# Patient Record
Sex: FEMALE | Race: WHITE | NOT HISPANIC OR LATINO | Employment: OTHER | URBAN - METROPOLITAN AREA
[De-identification: names, ages, dates, MRNs, and addresses within clinical notes are randomized per-mention and may not be internally consistent; named-entity substitution may affect disease eponyms.]

---

## 2021-03-23 ENCOUNTER — OFFICE VISIT CONVERTED (OUTPATIENT)
Dept: GASTROENTEROLOGY | Facility: CLINIC | Age: 81
End: 2021-03-23
Attending: NURSE PRACTITIONER

## 2021-05-10 NOTE — H&P
"   History and Physical      Patient Name: Rupal Buchanan   Patient ID: [id number]   Sex: Female   YOB: 1940    Primary Care Provider: Morris Tanner MD   Referring Provider: Morris Tanner MD    Visit Date: March 23, 2021    Provider: KEELEY AGUILERA   Location: [facility]   Location Address: [address] [place] [address]  [id number]   Location Phone: [phone]          Chief Complaint  · \"Abdominal pain\"      History Of Present Illness  The patient is a 81 year old /White female, who presents on referral from Morris Tanner MD, for a gastroenterology evaluation for abdominal pain.   The patient has described the pain as mild to moderate and crampy occurring intermittently. The pain is unchanged since it started. The location of the pain is LLQ and has been present for 4 months. The pain is improved with nothing and is worse with eating. The pain is not reproducible with palpation over the area.   The patient admits to no other complaints.   Previous Diagnostic Studies:  A GI diagnostic workup, prior to the initial visit, included the following studies: an abdominal CT scan. The abdominal CT, performed revealed the following: and see below A 1.3 cm linear radiopaque object within the colon at the hepatic flexure in the hepatic flexure.   Additionally, most laboratory test were normal except the following studies. Abnormal labs are CBC and CMP. CBCD revealed See below.. CMP revealed See below..      81-year-old female presents to the office as a new patient with a history of abdominal pain in the left lower quadrant, anemia, history of colon polyps, reflux and abnormal CT scan of the abdomen.  Patient was recently seen in the ER 1/15/2021 for abdominal pain in the left lower quadrant.  CT scan of the abdomen and pelvis with contrast revealed trace bilateral pleural effusions with left basilar atelectasis, hepatic steatosis, sigmoid diverticulosis, " suggestion of a 1.3 cm linear radiopaque object within the colon at the hepatic flexure, focal calcifications within mid uterus possibly a partially calcified fibroid and a small amount of pelvic fluid.  Patient was told to follow-up with gastroenterologist Dr. Gonzales and take Gas-X over-the-counter.  Patient reports she continues to have intermittent left lower quadrant abdominal pain and she feels this is related to gas.  Patient does not have any pain in the right upper quadrant.  Patient denies melena, hematochezia, weight loss, night sweats, fever, nausea, vomiting, altered bowel habits, chest pain, shortness of breath.  Patient reports that the pain in the left lower quadrant is worse with eating fried fatty foods or gas producing foods.  She reports this started in December and has worsened since then.  Patient is having 2 bowel movements a day that are easy to pass with no constipation.  Patient is on blood thinner Eliquis which was prescribed by her cardiologist Dr. Walsh.  Patient reports longstanding reflux over the past 50 years and is on pantoprazole 40 mg.  Patient reports control of her reflux.  Patient has a family history of stomach cancer within her sister at age 75.    Labs: 1/15/2021 [id number], creatinine1.55, GFR31, ALT15 AST24, RBC3.76, ygwvrmcxug10.4, xcmsnfzbxb42.9    Colonoscopy: Reviewed the patient's most recent colonoscopy performed by Dr. Oseguera 8/5/2016 revealed a normal colon throughout with no AVMs masses or colitis seen.    CT scan of the abdomen and pelvis with contrast: 1/15/2021 within the ER revealed trace bilateral pleural effusions with left basilar atelectasis, hepatic steatosis, sigmoid diverticulosis, suggestion of a 1.3 cm linear radiopaque object within the colon at hepatic flexure correlate if there has been any recent colonoscopy with a clipping, focal calcifications within mid uterus possibly a partially calcified fibroid, small amount of pelvic free fluid which  is nonspecific.       Past Medical History  Disease Name Date Onset Notes   Allergy, Unspecified --  seasonal   Anemia, iron deficiency --  --    Anxiety --  --    Arthritis --  --    Bladder disorder --  --    Chronic cystitis 02/15/2017 --    Cystitis (Acute) 01/20/2016 --    Dysuria 01/20/2016 --    GERD --  --    High cholesterol --  --    OAB (overactive bladder) --  --    Osteoporosis --  --    Vulvitis --  --          Past Surgical History  Procedure Name Date Notes   Appendectomy --  --    Cardiac Catherization --  --    Cholecystectomy --  --    Colonoscopy 2016 --    EGD 2005 --          Medication List  Name Date Started Instructions   alprazolam 1 mg oral tablet  take 1 tablet by oral route 2 times a day   atenolol 50 mg oral tablet  take 1 tablet (50 mg) by oral route once daily   Eliquis 5 mg oral tablet  take 1 tablet by oral route daily   hydrochlorothiazide 25 mg oral tablet  take 1 tablet (25 mg) by oral route once daily   pantoprazole 40 mg oral tablet,delayed release (DR/EC)  take 1 tablet (40 mg) by oral route once daily   ropinirole 5 mg oral tablet  take 1 tablet by oral route daily   simvastatin 40 mg oral tablet  take 1 tablet (40 mg) by oral route once daily in the evening   Suprep Bowel Prep Kit 17.5-3.13-1.6 gram oral recon soln  take as directed for bowel prep         Allergy List  Allergen Name Date Reaction Notes   Latex --  --  --    nitrofurantoin --  stomach pain, nausea, leg weakness, dizziness --    SULFA (SULFONAMIDES) --  --  --          Family Medical History  Disease Name Relative/Age Notes   Stomach Neoplasm, Malignant Sister/   --    Diabetes, unspecified type  --          Social History  Finding Status Start/Stop Quantity Notes   Alcohol Former --/-- --  --    Tobacco Former --/-- --  chews         Review of Systems  · Constitutional  o Denies  o : chills, fatigue, fever, loss of appetite, malaise, night sweats, weakness, weight gain, weight loss  · Eyes  o Denies  o :  "blurred vision, changes in vision  · Cardiovascular  o Denies  o : chest pain  · Respiratory  o Denies  o : shortness of breath  · Gastrointestinal  o Admits  o : abdominal pain, bloating, heartburn, reflux  o Denies  o : appetite poor, bowel changes, constipation, hematemesis (vomiting blood), hemorrhoids, jaundice, nausea, rectal bleeding, vomiting, reviewed and unchanged  · Genitourinary  o Denies  o : dysuria, blood in urine  · Integument  o Denies  o : rash  · Neurologic  o Denies  o : tingling or numbness  · Musculoskeletal  o Denies  o : joint pain  · Endocrine  o Denies  o : weight gain, weight loss  · Psychiatric  o Denies  o : anxiety, depression      Vitals  Date Time BP Position Site L\R Cuff Size HR RR TEMP (F) WT  HT  BMI kg/m2 BSA m2 O2 Sat FR L/min FiO2 HC       03/23/2021 09:48 /62 Sitting    68 - R 12  153lbs 0oz 5'  5\" 25.46 1.78 100 %            Physical Examination  · Constitutional  o Appearance  o : Well-nourished, well developed, alert, in no acute distress, alert and oriented X 3.  · Eyes  o Vision  o :   § Visual Fields  § : move symmetrical in all directions  o Sclerae  o : sclerae anicteric  o Pupils and Irises  o : pupils equal and symetrical  · Neck  o Inspection/Palpation  o : Trachea is midline, no adenopathy  o Thyroid  o : Thyroid is not enlarged  · Respiratory  o Respiratory Effort  o : Breathing is unlabored.  o Inspection of Chest  o : normal appearance, no retractions  o Auscultation of Lungs  o : Chest is clear to auscultation bilaterally  · Cardiovascular  o Heart  o :   § Auscultation of Heart  § : no murmurs, rubs, or gallops, regular rate and rhythm   · Gastrointestinal  o Abdominal Examination  o : Abdomen is soft, nontender to palpation, with normal active bowel sounds, no appreciable hepatosplenomegaly.  · Genitourinary  o Digital Rectal Examination  o : Deferred  · Skin and Subcutaneous Tissue  o General Inspection  o : Skin is without focal lesions. Skin turgor " is normal.  · Psychiatric  o Mood and Affect  o : Mood and affect are appropriate to circumstances.          Assessment  · Abdominal pain, left lower quadrant     789.04/R10.32  · Family History of Colon Cancer     V16.0/Z80.0  · Abnormal CT scan, colon     793.4/R93.3  · Abdominal bloating     787.3/R14.0  · GERD (gastroesophageal reflux disease)     530.81/K21.9      Plan  · Orders  o Flexible Colonoscopy -Possible risks/complications, benefits, and alternatives to surgical or invasive procedure have been explained to patient and/or legal gaurdian. -Patient has been evaluated and can tolerate anethesia and/or sedation. Risk, benefits, and alternatives to anethesia and/or sedation have been explained to patient and/or legal gaurdian. ([id number]) - - 03/23/2021  · Medications  o Medications have been Reconciled  o Transition of Care or Provider Policy  · Instructions  o DISCUSSION:  o PLAN:  o Will schedule colonoscopy. I have discussed the risks and benefits of the procedure with the patient as well as the procedure itself and they understand.  o 81-year-old female presents to the office today as a new patient with a history of abdominal pain in the left lower quadrant, anemia, history of colon polyps, reflux and abnormal CT scan of the abdomen. CT scan of the abdomen and pelvis with contrast suggestive of a 1.3 cm linear radiopaque object within the colon at the hepatic flexure. Patient's most recent colonoscopy was in 2016 revealed a normal colon throughout. Patient does have a history of colon polyps in 2005 the patient reports. Patient has family history of colorectal cancer. We have discussed the risk and benefits associated with colonoscopy especially due to age and patient being on Eliquis. Patient would like to proceed with a colonoscopy for further evaluation. We will obtain cardiac clearance from . Patient will continue pantoprazole 40 mg daily for reflux. We will follow-up in the office after  colonoscopy. Patient to call with any questions or concerns. I have discussed the patient's case with Dr. Gonzales.   · Disposition  o Call or Return if symptoms worsen or persist.            Electronically Signed by: KEELEY AGUILERA -Author on March 23, 2021 10:47:48 AM  Electronically Co-signed by: Cullen Gonzales MD -Reviewer on April 18, 2021 01:08:04 AM

## 2021-05-14 VITALS
HEART RATE: 68 BPM | BODY MASS INDEX: 25.49 KG/M2 | OXYGEN SATURATION: 100 % | SYSTOLIC BLOOD PRESSURE: 133 MMHG | RESPIRATION RATE: 12 BRPM | DIASTOLIC BLOOD PRESSURE: 62 MMHG | HEIGHT: 65 IN | WEIGHT: 153 LBS

## 2021-10-06 DIAGNOSIS — M25.561 RIGHT KNEE PAIN, UNSPECIFIED CHRONICITY: Primary | ICD-10-CM

## 2021-10-11 ENCOUNTER — OFFICE VISIT (OUTPATIENT)
Dept: ORTHOPEDIC SURGERY | Facility: CLINIC | Age: 81
End: 2021-10-11

## 2021-10-11 VITALS — WEIGHT: 151 LBS | BODY MASS INDEX: 25.16 KG/M2 | TEMPERATURE: 97.3 F | HEIGHT: 65 IN

## 2021-10-11 DIAGNOSIS — M11.20 CHONDROCALCINOSIS: ICD-10-CM

## 2021-10-11 DIAGNOSIS — M17.11 PRIMARY OSTEOARTHRITIS OF RIGHT KNEE: Primary | ICD-10-CM

## 2021-10-11 PROCEDURE — 20610 DRAIN/INJ JOINT/BURSA W/O US: CPT | Performed by: PHYSICIAN ASSISTANT

## 2021-10-11 PROCEDURE — 99204 OFFICE O/P NEW MOD 45 MIN: CPT | Performed by: PHYSICIAN ASSISTANT

## 2021-10-11 RX ORDER — PANTOPRAZOLE SODIUM 40 MG/1
40 TABLET, DELAYED RELEASE ORAL DAILY
COMMUNITY

## 2021-10-11 RX ORDER — METHYLPREDNISOLONE ACETATE 80 MG/ML
160 INJECTION, SUSPENSION INTRA-ARTICULAR; INTRALESIONAL; INTRAMUSCULAR; SOFT TISSUE
Status: COMPLETED | OUTPATIENT
Start: 2021-10-11 | End: 2021-10-11

## 2021-10-11 RX ORDER — OXYBUTYNIN CHLORIDE 10 MG/1
TABLET, EXTENDED RELEASE ORAL
Status: ON HOLD | COMMUNITY
End: 2022-01-03

## 2021-10-11 RX ORDER — HYDROCHLOROTHIAZIDE 25 MG/1
25 TABLET ORAL DAILY
COMMUNITY

## 2021-10-11 RX ORDER — ROPINIROLE 1 MG/1
1 TABLET, FILM COATED ORAL NIGHTLY
COMMUNITY

## 2021-10-11 RX ORDER — SIMVASTATIN 40 MG
40 TABLET ORAL NIGHTLY
COMMUNITY

## 2021-10-11 RX ORDER — ATENOLOL 50 MG/1
50 TABLET ORAL DAILY
COMMUNITY

## 2021-10-11 RX ORDER — HYDROCODONE BITARTRATE AND ACETAMINOPHEN 7.5; 325 MG/1; MG/1
1 TABLET ORAL EVERY 6 HOURS PRN
COMMUNITY

## 2021-10-11 RX ORDER — ALPRAZOLAM 1 MG/1
1 TABLET ORAL EVERY 12 HOURS SCHEDULED
COMMUNITY
End: 2023-03-25 | Stop reason: SDUPTHER

## 2021-10-11 RX ADMIN — METHYLPREDNISOLONE ACETATE 160 MG: 80 INJECTION, SUSPENSION INTRA-ARTICULAR; INTRALESIONAL; INTRAMUSCULAR; SOFT TISSUE at 15:32

## 2021-10-11 NOTE — PROGRESS NOTES
"Chief Complaint  Pain of the Right Knee and Establish Care    Subjective    History of Present Illness      Rupal Buchanan is a 81 y.o. female who presents to [facility] for complaint of Knee Pain:    Patient complains of right knee pain and swelling. She denies injury.  The pain began several months ago (since early Spring).   The pain is located over patellar aspect.    She describes the symptoms as aching and grinding.   Symptoms improve with rest.   The symptoms are worse with activity.   The knee has given out or felt unstable.   The patient can bend and straighten the knee fully.    She has not noticed the swelling improving of the last few months.            Objective   Vital Signs:   Temp 97.3 °F (36.3 °C)   Ht 165.1 cm (65\")   Wt 68.5 kg (151 lb)   BMI 25.13 kg/m²     Physical Exam  Vitals signs and nursing note reviewed.   Constitutional:       Appearance: Normal appearance.   Pulmonary:      Effort: Pulmonary effort is normal.   Skin:     General: Skin is warm and dry.      Capillary Refill: Capillary refill takes less than 2 seconds.   Neurological:      General: No focal deficit present.      Mental Status: He is alert and oriented to person, place, and time. Mental status is at baseline.   Psychiatric:         Mood and Affect: Mood normal.         Behavior: Behavior normal.         Thought Content: Thought content normal.         Judgment: Judgment normal.     Ortho Exam   RIGHT knee  Positive patellar grind test with pain in the retropatellar region.    Positive for high Q-angle with patella tracking laterally in high grades of flexion.   Skin and soft tissues are swollen, consistent with inflammatory changes of the patellofemoral joint.    Positive for tenderness over the medial patellofemoral ligaments.   Quadriceps mechanism is well preserved.   Range of motion-Extension to Flexion: 0-100 degrees.  Negative anterior and posterior drawer. No medial or lateral " instability noted.   Dorsalis pedis and posterior tibial artery pulses are palpable. Common peroneal nerve function is well preserved.      Result Review :   Radiologic studies - see below for interpretation  Right knee xrays 3 views, performed at [facility] 8/12/21, summary of independent interpretation by myself as follows:   Findings: Right knee advanced OA of patellofemoral compartment, moderate degenerative changes of the medial and lateral compartments, chondrocalcinosis throughout the entire joint.          PROCEDURE  Large Joint Arthrocentesis: R knee  Date/Time: 10/11/2021 3:32 PM  Consent given by: patient  Site marked: site marked  Timeout: Immediately prior to procedure a time out was called to verify the correct patient, procedure, equipment, support staff and site/side marked as required   Supporting Documentation  Indications: pain   Procedure Details  Location: knee - R knee  Preparation: Patient was prepped and draped in the usual sterile fashion  Needle size: 18 G  Approach: lateral  Medications administered: 160 mg methylPREDNISolone acetate 80 MG/ML; 2 mL lidocaine (cardiac)  Aspirate amount: 19 mL  Aspirate: yellow  Patient tolerance: patient tolerated the procedure well with no immediate complications                 Assessment   Assessment and Plan    Problem List Items Addressed This Visit        Musculoskeletal and Injuries    Chondrocalcinosis    Relevant Orders    Large Joint Arthrocentesis: R knee    Primary osteoarthritis of right knee - Primary    Relevant Orders    Large Joint Arthrocentesis: R knee          Follow Up   · Discussion of any imaging in detail. Discussion of orthopaedic goals.  · Risk, benefits, and merits of treatment alternatives reviewed with the patient. Treatment alternatives include: intra-articular steroid injection and eventual surgery. Discussed causes of chondrocalcinosis, as well as treatments.  · Ice, heat, and/or modalities as beneficial  · Risks of  minor surgical procedure/intra-articular injection, including but not limited to pain, bleeding, infection into the joint, pigment skin changes related to steroid administration, increased blood sugar levels secondary to steroid administration, scar, recurrence of pain, and tendon rupture associated with steroid administration and possible need for further surgery reviewed with patient she accepts these risks and wishes to proceed with procedure. Aspiration followed by Injection to patient's right knee joint(s) with Depo-Medrol from a(n) lateral approach.  Patient tolerated the procedure well and no complications were encountered.  · Watch for signs and symptoms of infection  · Patient is encouraged to call or return for any issues or concerns.  · Follow up in 3 months  • Patient was given instructions and counseling regarding her condition or for health maintenance advice. Please see specific information pulled into the AVS if appropriate.     Justo Polanco PA-C   Date of Encounter: 10/11/2021   Electronically signed by Justo Polanco PA-C, 10/11/21, 3:35 PM EDT.     EMR Dragon/Transcription disclaimer:  Much of this encounter note is an electronic transcription/translation of spoken language to printed text. The electronic translation of spoken language may permit erroneous, or at times, nonsensical words or phrases to be inadvertently transcribed; Although I have reviewed the note for such errors, some may still exist.

## 2022-01-02 ENCOUNTER — HOSPITAL ENCOUNTER (INPATIENT)
Facility: HOSPITAL | Age: 82
LOS: 2 days | Discharge: HOME | End: 2022-01-07
Attending: EMERGENCY MEDICINE | Admitting: INTERNAL MEDICINE

## 2022-01-02 ENCOUNTER — APPOINTMENT (OUTPATIENT)
Dept: GENERAL RADIOLOGY | Facility: HOSPITAL | Age: 82
End: 2022-01-02

## 2022-01-02 DIAGNOSIS — N28.9 RENAL INSUFFICIENCY: ICD-10-CM

## 2022-01-02 DIAGNOSIS — R07.9 CHEST PAIN, UNSPECIFIED TYPE: ICD-10-CM

## 2022-01-02 DIAGNOSIS — I48.0 PAROXYSMAL ATRIAL FIBRILLATION: ICD-10-CM

## 2022-01-02 DIAGNOSIS — I20.8 STABLE ANGINA PECTORIS: Primary | ICD-10-CM

## 2022-01-02 LAB — TROPONIN I SERPL-MCNC: 0 NG/ML (ref 0–0.6)

## 2022-01-02 PROCEDURE — 93005 ELECTROCARDIOGRAM TRACING: CPT | Performed by: EMERGENCY MEDICINE

## 2022-01-02 PROCEDURE — 93010 ELECTROCARDIOGRAM REPORT: CPT | Performed by: INTERNAL MEDICINE

## 2022-01-02 PROCEDURE — 36415 COLL VENOUS BLD VENIPUNCTURE: CPT

## 2022-01-02 PROCEDURE — 93005 ELECTROCARDIOGRAM TRACING: CPT

## 2022-01-02 PROCEDURE — 84484 ASSAY OF TROPONIN QUANT: CPT | Performed by: INTERNAL MEDICINE

## 2022-01-02 PROCEDURE — 83690 ASSAY OF LIPASE: CPT | Performed by: EMERGENCY MEDICINE

## 2022-01-02 PROCEDURE — 85007 BL SMEAR W/DIFF WBC COUNT: CPT | Performed by: EMERGENCY MEDICINE

## 2022-01-02 PROCEDURE — 82553 CREATINE MB FRACTION: CPT | Performed by: EMERGENCY MEDICINE

## 2022-01-02 PROCEDURE — 85025 COMPLETE CBC W/AUTO DIFF WBC: CPT | Performed by: EMERGENCY MEDICINE

## 2022-01-02 PROCEDURE — 71045 X-RAY EXAM CHEST 1 VIEW: CPT

## 2022-01-02 PROCEDURE — 82550 ASSAY OF CK (CPK): CPT | Performed by: EMERGENCY MEDICINE

## 2022-01-02 PROCEDURE — 84484 ASSAY OF TROPONIN QUANT: CPT

## 2022-01-02 PROCEDURE — 80053 COMPREHEN METABOLIC PANEL: CPT | Performed by: EMERGENCY MEDICINE

## 2022-01-02 PROCEDURE — 83735 ASSAY OF MAGNESIUM: CPT | Performed by: EMERGENCY MEDICINE

## 2022-01-02 PROCEDURE — 83880 ASSAY OF NATRIURETIC PEPTIDE: CPT | Performed by: EMERGENCY MEDICINE

## 2022-01-02 PROCEDURE — 99284 EMERGENCY DEPT VISIT MOD MDM: CPT

## 2022-01-02 RX ORDER — ASPIRIN 81 MG/1
324 TABLET, CHEWABLE ORAL ONCE
Status: DISCONTINUED | OUTPATIENT
Start: 2022-01-02 | End: 2022-01-07 | Stop reason: HOSPADM

## 2022-01-02 RX ORDER — SODIUM CHLORIDE 0.9 % (FLUSH) 0.9 %
10 SYRINGE (ML) INJECTION AS NEEDED
Status: DISCONTINUED | OUTPATIENT
Start: 2022-01-02 | End: 2022-01-03

## 2022-01-03 PROBLEM — R07.9 CHEST PAIN: Status: ACTIVE | Noted: 2022-01-03

## 2022-01-03 LAB
ALBUMIN SERPL-MCNC: 4.7 G/DL (ref 3.5–5.2)
ALBUMIN/GLOB SERPL: 2 G/DL
ALP SERPL-CCNC: 124 U/L (ref 39–117)
ALT SERPL W P-5'-P-CCNC: 13 U/L (ref 1–33)
ANION GAP SERPL CALCULATED.3IONS-SCNC: 10.8 MMOL/L (ref 5–15)
ANION GAP SERPL CALCULATED.3IONS-SCNC: 12 MMOL/L (ref 5–15)
AST SERPL-CCNC: 22 U/L (ref 1–32)
BASOPHILS # BLD AUTO: 0.04 10*3/MM3 (ref 0–0.2)
BASOPHILS NFR BLD AUTO: 0.7 % (ref 0–1.5)
BILIRUB SERPL-MCNC: 0.8 MG/DL (ref 0–1.2)
BUN SERPL-MCNC: 33 MG/DL (ref 8–23)
BUN SERPL-MCNC: 34 MG/DL (ref 8–23)
BUN/CREAT SERPL: 19.8 (ref 7–25)
BUN/CREAT SERPL: 22.8 (ref 7–25)
CALCIUM SPEC-SCNC: 9.8 MG/DL (ref 8.6–10.5)
CALCIUM SPEC-SCNC: 9.9 MG/DL (ref 8.6–10.5)
CHLORIDE SERPL-SCNC: 101 MMOL/L (ref 98–107)
CHLORIDE SERPL-SCNC: 101 MMOL/L (ref 98–107)
CHOLEST SERPL-MCNC: 135 MG/DL (ref 0–200)
CK MB SERPL-CCNC: 3.51 NG/ML
CK SERPL-CCNC: 120 U/L (ref 20–180)
CO2 SERPL-SCNC: 26.2 MMOL/L (ref 22–29)
CO2 SERPL-SCNC: 27 MMOL/L (ref 22–29)
CREAT SERPL-MCNC: 1.49 MG/DL (ref 0.57–1)
CREAT SERPL-MCNC: 1.67 MG/DL (ref 0.57–1)
CYTO UR: NORMAL
DEPRECATED RDW RBC AUTO: 40.9 FL (ref 37–54)
DEPRECATED RDW RBC AUTO: 42.3 FL (ref 37–54)
EOSINOPHIL # BLD AUTO: 0.26 10*3/MM3 (ref 0–0.4)
EOSINOPHIL NFR BLD AUTO: 4.5 % (ref 0.3–6.2)
ERYTHROCYTE [DISTWIDTH] IN BLOOD BY AUTOMATED COUNT: 12.2 % (ref 12.3–15.4)
ERYTHROCYTE [DISTWIDTH] IN BLOOD BY AUTOMATED COUNT: 12.3 % (ref 12.3–15.4)
GFR SERPL CREATININE-BSD FRML MDRD: 29 ML/MIN/1.73
GFR SERPL CREATININE-BSD FRML MDRD: 34 ML/MIN/1.73
GLOBULIN UR ELPH-MCNC: 2.4 GM/DL
GLUCOSE SERPL-MCNC: 105 MG/DL (ref 65–99)
GLUCOSE SERPL-MCNC: 105 MG/DL (ref 65–99)
HBA1C MFR BLD: 5.5 % (ref 4.8–5.6)
HCT VFR BLD AUTO: 33.5 % (ref 34–46.6)
HCT VFR BLD AUTO: 34.4 % (ref 34–46.6)
HDLC SERPL-MCNC: 63 MG/DL (ref 40–60)
HGB BLD-MCNC: 11.8 G/DL (ref 12–15.9)
HGB BLD-MCNC: 12.3 G/DL (ref 12–15.9)
HOLD SPECIMEN: NORMAL
IMM GRANULOCYTES # BLD AUTO: 0.01 10*3/MM3 (ref 0–0.05)
IMM GRANULOCYTES NFR BLD AUTO: 0.2 % (ref 0–0.5)
INR PPP: 1.02 (ref 2–3)
LAB AP CASE REPORT: NORMAL
LAB AP CLINICAL INFORMATION: NORMAL
LDLC SERPL CALC-MCNC: 54 MG/DL (ref 0–100)
LDLC/HDLC SERPL: 0.83 {RATIO}
LIPASE SERPL-CCNC: 59 U/L (ref 13–60)
LYMPHOCYTES # BLD AUTO: 2.35 10*3/MM3 (ref 0.7–3.1)
LYMPHOCYTES NFR BLD AUTO: 40.7 % (ref 19.6–45.3)
MAGNESIUM SERPL-MCNC: 1.8 MG/DL (ref 1.6–2.4)
MAGNESIUM SERPL-MCNC: 1.8 MG/DL (ref 1.6–2.4)
MCH RBC QN AUTO: 32.6 PG (ref 26.6–33)
MCH RBC QN AUTO: 32.9 PG (ref 26.6–33)
MCHC RBC AUTO-ENTMCNC: 35.2 G/DL (ref 31.5–35.7)
MCHC RBC AUTO-ENTMCNC: 35.8 G/DL (ref 31.5–35.7)
MCV RBC AUTO: 92 FL (ref 79–97)
MCV RBC AUTO: 92.5 FL (ref 79–97)
MONOCYTES # BLD AUTO: 0.62 10*3/MM3 (ref 0.1–0.9)
MONOCYTES NFR BLD AUTO: 10.7 % (ref 5–12)
NEUTROPHILS NFR BLD AUTO: 2.49 10*3/MM3 (ref 1.7–7)
NEUTROPHILS NFR BLD AUTO: 43.2 % (ref 42.7–76)
NRBC BLD AUTO-RTO: 0 /100 WBC (ref 0–0.2)
NT-PROBNP SERPL-MCNC: 3915 PG/ML (ref 0–1800)
PATH REPORT.FINAL DX SPEC: NORMAL
PATH REPORT.GROSS SPEC: NORMAL
PATHOLOGY REVIEW: YES
PLAT MORPH BLD: NORMAL
PLATELET # BLD AUTO: 172 10*3/MM3 (ref 140–450)
PLATELET # BLD AUTO: 184 10*3/MM3 (ref 140–450)
PMV BLD AUTO: 10 FL (ref 6–12)
PMV BLD AUTO: 9.8 FL (ref 6–12)
POTASSIUM SERPL-SCNC: 3.6 MMOL/L (ref 3.5–5.2)
POTASSIUM SERPL-SCNC: 4.4 MMOL/L (ref 3.5–5.2)
PROT SERPL-MCNC: 7.1 G/DL (ref 6–8.5)
PROTHROMBIN TIME: 10.7 SECONDS (ref 9.4–12)
RBC # BLD AUTO: 3.62 10*6/MM3 (ref 3.77–5.28)
RBC # BLD AUTO: 3.74 10*6/MM3 (ref 3.77–5.28)
RBC AGGLUT BLD QL: NORMAL
SARS-COV-2 RNA PNL SPEC NAA+PROBE: NOT DETECTED
SODIUM SERPL-SCNC: 138 MMOL/L (ref 136–145)
SODIUM SERPL-SCNC: 140 MMOL/L (ref 136–145)
TRIGL SERPL-MCNC: 97 MG/DL (ref 0–150)
TROPONIN I SERPL-MCNC: 0.01 NG/ML (ref 0–0.6)
TROPONIN T SERPL-MCNC: <0.01 NG/ML (ref 0–0.03)
TROPONIN T SERPL-MCNC: <0.01 NG/ML (ref 0–0.03)
VLDLC SERPL-MCNC: 18 MG/DL (ref 5–40)
WBC MORPH BLD: NORMAL
WBC NRBC COR # BLD: 5.77 10*3/MM3 (ref 3.4–10.8)
WBC NRBC COR # BLD: 6.42 10*3/MM3 (ref 3.4–10.8)
WHOLE BLOOD HOLD SPECIMEN: NORMAL
WHOLE BLOOD HOLD SPECIMEN: NORMAL

## 2022-01-03 PROCEDURE — 84484 ASSAY OF TROPONIN QUANT: CPT

## 2022-01-03 PROCEDURE — 85610 PROTHROMBIN TIME: CPT | Performed by: PHYSICIAN ASSISTANT

## 2022-01-03 PROCEDURE — G0378 HOSPITAL OBSERVATION PER HR: HCPCS

## 2022-01-03 PROCEDURE — 80061 LIPID PANEL: CPT | Performed by: PHYSICIAN ASSISTANT

## 2022-01-03 PROCEDURE — 80048 BASIC METABOLIC PNL TOTAL CA: CPT | Performed by: PHYSICIAN ASSISTANT

## 2022-01-03 PROCEDURE — 83036 HEMOGLOBIN GLYCOSYLATED A1C: CPT | Performed by: PHYSICIAN ASSISTANT

## 2022-01-03 PROCEDURE — 85027 COMPLETE CBC AUTOMATED: CPT | Performed by: PHYSICIAN ASSISTANT

## 2022-01-03 PROCEDURE — 94799 UNLISTED PULMONARY SVC/PX: CPT

## 2022-01-03 PROCEDURE — 84484 ASSAY OF TROPONIN QUANT: CPT | Performed by: PHYSICIAN ASSISTANT

## 2022-01-03 PROCEDURE — 83735 ASSAY OF MAGNESIUM: CPT | Performed by: PHYSICIAN ASSISTANT

## 2022-01-03 PROCEDURE — U0004 COV-19 TEST NON-CDC HGH THRU: HCPCS

## 2022-01-03 PROCEDURE — 99233 SBSQ HOSP IP/OBS HIGH 50: CPT | Performed by: FAMILY MEDICINE

## 2022-01-03 PROCEDURE — 93010 ELECTROCARDIOGRAM REPORT: CPT | Performed by: INTERNAL MEDICINE

## 2022-01-03 PROCEDURE — 93005 ELECTROCARDIOGRAM TRACING: CPT | Performed by: EMERGENCY MEDICINE

## 2022-01-03 RX ORDER — ATORVASTATIN CALCIUM 20 MG/1
20 TABLET, FILM COATED ORAL DAILY
Status: DISCONTINUED | OUTPATIENT
Start: 2022-01-03 | End: 2022-01-04

## 2022-01-03 RX ORDER — ATENOLOL 50 MG/1
50 TABLET ORAL
Status: DISCONTINUED | OUTPATIENT
Start: 2022-01-03 | End: 2022-01-07 | Stop reason: HOSPADM

## 2022-01-03 RX ORDER — ALPRAZOLAM 1 MG/1
1 TABLET ORAL EVERY 12 HOURS SCHEDULED
Status: DISCONTINUED | OUTPATIENT
Start: 2022-01-03 | End: 2022-01-07 | Stop reason: HOSPADM

## 2022-01-03 RX ORDER — SODIUM CHLORIDE 0.9 % (FLUSH) 0.9 %
10 SYRINGE (ML) INJECTION AS NEEDED
Status: DISCONTINUED | OUTPATIENT
Start: 2022-01-03 | End: 2022-01-07 | Stop reason: HOSPADM

## 2022-01-03 RX ORDER — PANTOPRAZOLE SODIUM 40 MG/1
40 TABLET, DELAYED RELEASE ORAL
Status: DISCONTINUED | OUTPATIENT
Start: 2022-01-03 | End: 2022-01-07 | Stop reason: HOSPADM

## 2022-01-03 RX ORDER — NITROGLYCERIN 0.4 MG/1
0.4 TABLET SUBLINGUAL
Status: DISCONTINUED | OUTPATIENT
Start: 2022-01-03 | End: 2022-01-07 | Stop reason: HOSPADM

## 2022-01-03 RX ORDER — ROPINIROLE 1 MG/1
1 TABLET, FILM COATED ORAL NIGHTLY
Status: DISCONTINUED | OUTPATIENT
Start: 2022-01-03 | End: 2022-01-07 | Stop reason: HOSPADM

## 2022-01-03 RX ORDER — AMOXICILLIN 250 MG
2 CAPSULE ORAL 2 TIMES DAILY
Status: DISCONTINUED | OUTPATIENT
Start: 2022-01-03 | End: 2022-01-07 | Stop reason: HOSPADM

## 2022-01-03 RX ORDER — SODIUM CHLORIDE 0.9 % (FLUSH) 0.9 %
10 SYRINGE (ML) INJECTION EVERY 12 HOURS SCHEDULED
Status: DISCONTINUED | OUTPATIENT
Start: 2022-01-03 | End: 2022-01-07 | Stop reason: HOSPADM

## 2022-01-03 RX ORDER — BISACODYL 5 MG/1
5 TABLET, DELAYED RELEASE ORAL DAILY PRN
Status: DISCONTINUED | OUTPATIENT
Start: 2022-01-03 | End: 2022-01-07 | Stop reason: HOSPADM

## 2022-01-03 RX ORDER — FAMOTIDINE 10 MG/ML
20 INJECTION, SOLUTION INTRAVENOUS ONCE
Status: COMPLETED | OUTPATIENT
Start: 2022-01-03 | End: 2022-01-03

## 2022-01-03 RX ORDER — ONDANSETRON 2 MG/ML
4 INJECTION INTRAMUSCULAR; INTRAVENOUS EVERY 6 HOURS PRN
Status: DISCONTINUED | OUTPATIENT
Start: 2022-01-03 | End: 2022-01-07 | Stop reason: HOSPADM

## 2022-01-03 RX ORDER — ACETAMINOPHEN 325 MG/1
650 TABLET ORAL EVERY 4 HOURS PRN
Status: DISCONTINUED | OUTPATIENT
Start: 2022-01-03 | End: 2022-01-07 | Stop reason: SDUPTHER

## 2022-01-03 RX ORDER — BISACODYL 10 MG
10 SUPPOSITORY, RECTAL RECTAL DAILY PRN
Status: DISCONTINUED | OUTPATIENT
Start: 2022-01-03 | End: 2022-01-07 | Stop reason: HOSPADM

## 2022-01-03 RX ORDER — POLYETHYLENE GLYCOL 3350 17 G/17G
17 POWDER, FOR SOLUTION ORAL DAILY PRN
Status: DISCONTINUED | OUTPATIENT
Start: 2022-01-03 | End: 2022-01-07 | Stop reason: HOSPADM

## 2022-01-03 RX ORDER — ALUMINA, MAGNESIA, AND SIMETHICONE 2400; 2400; 240 MG/30ML; MG/30ML; MG/30ML
15 SUSPENSION ORAL EVERY 6 HOURS PRN
Status: DISCONTINUED | OUTPATIENT
Start: 2022-01-03 | End: 2022-01-07 | Stop reason: HOSPADM

## 2022-01-03 RX ADMIN — ATORVASTATIN CALCIUM 20 MG: 20 TABLET, FILM COATED ORAL at 09:10

## 2022-01-03 RX ADMIN — ALPRAZOLAM 1 MG: 1 TABLET ORAL at 21:45

## 2022-01-03 RX ADMIN — SENNOSIDES AND DOCUSATE SODIUM 2 TABLET: 50; 8.6 TABLET ORAL at 09:10

## 2022-01-03 RX ADMIN — ROPINIROLE HYDROCHLORIDE 1 MG: 1 TABLET, FILM COATED ORAL at 21:45

## 2022-01-03 RX ADMIN — SODIUM CHLORIDE, PRESERVATIVE FREE 10 ML: 5 INJECTION INTRAVENOUS at 09:10

## 2022-01-03 RX ADMIN — SODIUM CHLORIDE, PRESERVATIVE FREE 10 ML: 5 INJECTION INTRAVENOUS at 20:50

## 2022-01-03 RX ADMIN — PANTOPRAZOLE SODIUM 40 MG: 40 TABLET, DELAYED RELEASE ORAL at 09:10

## 2022-01-03 RX ADMIN — FAMOTIDINE 20 MG: 10 INJECTION INTRAVENOUS at 01:14

## 2022-01-03 RX ADMIN — SENNOSIDES AND DOCUSATE SODIUM 2 TABLET: 50; 8.6 TABLET ORAL at 20:50

## 2022-01-03 RX ADMIN — APIXABAN 2.5 MG: 2.5 TABLET, FILM COATED ORAL at 09:11

## 2022-01-03 RX ADMIN — APIXABAN 2.5 MG: 2.5 TABLET, FILM COATED ORAL at 20:50

## 2022-01-03 RX ADMIN — ATENOLOL 50 MG: 50 TABLET ORAL at 09:10

## 2022-01-03 NOTE — PLAN OF CARE
Problem: Adult Inpatient Plan of Care  Goal: Plan of Care Review  Outcome: Ongoing, Progressing  Flowsheets (Taken 1/3/2022 0422)  Progress: no change  Plan of Care Reviewed With:   patient   spouse  Outcome Summary: ED admit for CP, VSS, denies any CP or SOA, pt NPO for Cardiology CNS  Goal: Patient-Specific Goal (Individualized)  Outcome: Ongoing, Progressing  Goal: Absence of Hospital-Acquired Illness or Injury  Outcome: Ongoing, Progressing  Intervention: Identify and Manage Fall Risk  Recent Flowsheet Documentation  Taken 1/3/2022 0400 by Flaca Tariq RN  Safety Promotion/Fall Prevention: safety round/check completed  Goal: Optimal Comfort and Wellbeing  Outcome: Ongoing, Progressing  Intervention: Provide Person-Centered Care  Recent Flowsheet Documentation  Taken 1/3/2022 0400 by Flaca Tariq RN  Trust Relationship/Rapport: care explained  Goal: Readiness for Transition of Care  Outcome: Ongoing, Progressing  Intervention: Mutually Develop Transition Plan  Recent Flowsheet Documentation  Taken 1/3/2022 0400 by Flaca Tariq RN  Equipment Currently Used at Home: none  Transportation Anticipated:   car, drives self   family or friend will provide  Patient/Family Anticipated Services at Transition: none  Patient/Family Anticipates Transition to: home with family     Problem: Hypertension Comorbidity  Goal: Blood Pressure in Desired Range  Outcome: Ongoing, Progressing  Intervention: Maintain Hypertension-Management Strategies  Recent Flowsheet Documentation  Taken 1/3/2022 0400 by Flaca Tariq RN  Medication Review/Management: medications reviewed     Problem: Pain Chronic (Persistent) (Comorbidity Management)  Goal: Acceptable Pain Control and Functional Ability  Outcome: Ongoing, Progressing  Intervention: Manage Persistent Pain  Recent Flowsheet Documentation  Taken 1/3/2022 0400 by Flaca Tariq RN  Medication Review/Management: medications reviewed   Goal Outcome Evaluation:  Plan of Care Reviewed  With: patient, spouse        Progress: no change  Outcome Summary: ED admit for CP, VSS, denies any CP or SOA, pt NPO for Cardiology CNS

## 2022-01-03 NOTE — CONSULTS
Cardiology Consult Note  [facility] CARE UNIT 2          Patient Identification:  Rupal Buchanan      [id number]  81 y.o.        female  1940         Reason for Consultation: Chest pain    PCP: Morris Tanner MD    History of Present Illness:     Patient is a 81-year-old female who has a chronic atrial fibrillation hypertension kidney disease came to the emergency room for evaluation of chest pain.  She describes the pain as pressure in the middle of the chest not related to physical exertion no radiation lasted off and on all day yesterday.  No chest pain today.  EKG shows atrial fibrillation with no acute changes.  Troponins are negative.  No history of previous myocardial infarction    Past History:  Past Medical History:   Diagnosis Date   • Anemia    • GERD (gastroesophageal reflux disease)    • Heart disease    • Hyperlipidemia    • Hypertension    • Kidney disease    • Restless leg      Past Surgical History:   Procedure Laterality Date   • APPENDECTOMY     • CARDIAC SURGERY     • CHOLECYSTECTOMY       Allergies   Allergen Reactions   • Nitrofurantoin GI Intolerance   • Sulfa Antibiotics Itching   • Latex Rash     Social History     Socioeconomic History   • Marital status:    Tobacco Use   • Smoking status: Never Smoker   • Smokeless tobacco: Current User     Types: Chew   Substance and Sexual Activity   • Alcohol use: Never   • Drug use: Never     History reviewed. No pertinent family history.      Medications:  Prior to Admission medications    Medication Sig Start Date End Date Taking? Authorizing Provider   ALPRAZolam (XANAX) 1 MG tablet Take 1 mg by mouth Every 12 (Twelve) Hours.   Yes ProviderYon MD   apixaban (Eliquis) 2.5 MG tablet tablet Take 2.5 mg by mouth Every 12 (Twelve) Hours.   Yes ProviderYon MD   atenolol (TENORMIN) 50 MG tablet Take 50 mg by mouth Daily.   Yes Yon Vogt MD   hydroCHLOROthiazide (HYDRODIURIL) 25 MG tablet  "Take 25 mg by mouth Daily.   Yes Yon Vogt MD   HYDROcodone-acetaminophen (NORCO) 7.5-325 MG per tablet Take 1 tablet by mouth Every 6 (Six) Hours As Needed for Moderate Pain .   Yes Yon Vogt MD   pantoprazole (PROTONIX) 40 MG EC tablet Take 40 mg by mouth Daily.   Yes Yon Vogt MD   rOPINIRole (REQUIP) 1 MG tablet Take 1 mg by mouth Every Night.   Yes Yon Vogt MD   simvastatin (Zocor) 40 MG tablet Take 40 mg by mouth Every Night.   Yes Yon Vogt MD   oxybutynin XL (DITROPAN-XL) 10 MG 24 hr tablet   1/3/22  Yon Vogt MD   raNITIdine HCl (WAL-ADAMA 75 PO) Zantac  1/3/22  Yon Vogt MD      Current medications:  apixaban, 2.5 mg, Oral, Q12H  aspirin, 324 mg, Oral, Once  atenolol, 50 mg, Oral, Q24H  atorvastatin, 20 mg, Oral, Daily  pantoprazole, 40 mg, Oral, Q AM  senna-docusate sodium, 2 tablet, Oral, BID  sodium chloride, 10 mL, Intravenous, Q12H      Current IV drips:         Review of Systems  Review of Systems   Constitutional: Negative for appetite change, fatigue and fever.   HENT: Negative for congestion.    Respiratory: Negative for apnea, choking, chest tightness, shortness of breath and wheezing.    Cardiovascular: Negative for chest pain, palpitations and leg swelling.   Gastrointestinal: Negative for diarrhea, nausea and vomiting.   Genitourinary: Negative for dysuria, frequency and hematuria.   Musculoskeletal: Positive for arthralgias. Negative for myalgias.   Skin: Negative for pallor.   Neurological: Negative for dizziness, tremors, syncope and weakness.   Psychiatric/Behavioral: Negative for agitation and confusion.         Physical Exam    /56 (BP Location: Right arm, Patient Position: Lying)   Pulse 53   Temp 97.5 °F (36.4 °C) (Oral)   Resp 18   Ht 165.1 cm (65\")   Wt 68.9 kg (151 lb 14.4 oz)   SpO2 100%   BMI 25.28 kg/m²  Body mass index is 25.28 kg/m².   Oxygen saturation   @FLOWAN(10::1)@ SpO2  Min: " 99 %  Max: 100 %    General Appearance:   · no acute distress  · Alert and oriented x3  HENT:   · lips not cyanotic  · Atraumatic  Neck:  · thyroid not enlarged  · supple  Respiratory:  · no respiratory distress  · normal breath sounds  · no rales  Cardiovascular:  · no jugular venous distention  · regular rhythm  · apical impulse normal  · S1 normal, S2 normal  · no S3, no S4   · no murmur  · no rub, no thrill  · no carotid bruit  · pedal pulses normal  · lower extremity edema: none    Gastrointestinal:   · bowel sounds normal  · non-tender  · no hepatomegaly, no splenomegaly  Musculoskeletal:  · no clubbing of fingers.   · normocephalic, head atraumatic  Skin:   · warm, dry  · no rashes  Neuro/Psychiatric:  · normal mood and affect  · judgement and insight appropriate      Cardiographics:     ECG  (personally reviewed) EKG: Shows atrial fibrillation with satisfactory rate and minor ST-T changes   Telemetry:  (personally reviewed) atrial fibrillation          No results found for this or any previous visit.      Cardiolite (Tc-99m Sestamibi) stress test   Lab Review:       CBC    CBC 1/15/21 1/2/22 1/3/22   WBC 6.22 5.77 6.42   RBC 3.76 (A) 3.74 (A) 3.62 (A)   Hemoglobin 12.4 12.3 11.8 (A)   Hematocrit 35.9 (A) 34.4 33.5 (A)   MCV 95.5 92.0 92.5   MCH 33.0 (A) 32.9 32.6   MCHC 34.5 35.8 (A) 35.2   RDW 12.3 12.3 12.2 (A)   Platelets 227 184 172   (A) Abnormal value                CMP    CMP 1/15/21 1/2/22 1/3/22   Glucose 107 (A) 105 (A) 105 (A)   BUN 24 33 (A) 34 (A)   Creatinine 1.55 (A) 1.67 (A) 1.49 (A)   eGFR Non African Am  29 (A) 34 (A)   Sodium 138 140 138   Potassium 3.7 3.6 4.4   Chloride 98 (A) 101 101   Calcium 9.6 9.8 9.9   Albumin 4.6 4.70    Total Bilirubin 1.24 0.8    Alkaline Phosphatase 104 124 (A)    AST (SGOT) 24 22    ALT (SGPT) 15 13    (A) Abnormal value       Comments are available for some flowsheets but are not being displayed.              CARDIAC LABS:      Lab 01/03/22  8465  01/02/22  2320   PROBNP  --  3,915.0*   TROPONIN T <0.010  --    PROTIME 10.7  --    INR 1.02*  --       No results found for: DIGOXIN   No results found for: TSH  Results from last 7 days   Lab Units 01/03/22  0415   CHOLESTEROL mg/dL 135   HDL CHOL mg/dL 63*     Lab Results   Component Value Date    POCTROP 0.01 01/03/2022     No components found for: DDIMERQUAN  Lab Results   Component Value Date    MG 1.8 01/03/2022                 Assessment:  Chest pain rule out MI  Chronic atrial fibrillation, rate controlled, anticoagulated on Eliquis  Essential hypertension  CKD stage IIIb         Plan:  Patient has chest pain which has some features of angina.  We will going order a Lexiscan to make sure she does not have any significant coronary disease      Thank you for allowing me to share in [facility].        Hitesh Mcguire MD   1/3/2022    11:07 EST

## 2022-01-03 NOTE — H&P
[facility]   HOSPITALIST HISTORY AND PHYSICAL  Date: 1/3/2022   Patient Name: Rupal Buchanan  : 1940  MRN: [id number]  Primary Care Physician:  Morris Tanner MD  Date of admission: 2022    Subjective   Subjective     Chief Complaint: Chest pain    HPI:    Rupal Buchanan is a 81 y.o. female who  has a past medical history of Anemia, Heart disease, Hypertension, and Kidney disease.  She presented to emergency.  Patient is not very forthcoming with the history.  Patient states she started having chest pain yesterday evening.  Was associated with shortness of breath and nausea.  Denies diaphoresis.  Pain was located in the center of her chest and she described as pressure.  Did not radiate.  Her vitals were stable during her stay in the ED.  She had 2 - troponins.  EKG revealed A. fib which she has a history of and takes Eliquis.  Patient follows with Dr. Gosselin.  Gulshan Gregory was notified and agreed to see the patient.  Because of the above, the hospitalist team was contacted to admit for further management.        Personal History     Past Medical History:  Past Medical History:   Diagnosis Date   • Anemia    • Heart disease    • Hypertension    • Kidney disease        Past Surgical History:  Past Surgical History:   Procedure Laterality Date   • APPENDECTOMY     • CARDIAC SURGERY     • CHOLECYSTECTOMY         Family History:   History reviewed. No pertinent family history.    Social History:    reports that she has never smoked. Her smokeless tobacco use includes chew. She reports that she does not drink alcohol and does not use drugs.    Home Medications:  ALPRAZolam, HYDROcodone-acetaminophen, apixaban, atenolol, hydroCHLOROthiazide, oxybutynin XL, pantoprazole, rOPINIRole, raNITIdine HCl, and simvastatin    Allergies:  Allergies   Allergen Reactions   • Nitrofurantoin GI Intolerance   • Sulfa Antibiotics Itching   • Latex Rash       Review of Systems   All systems were reviewed and negative  except for: Chest pain, nausea, shortness of breath    Objective   Objective     Vitals:   Temp:  [98.1 °F (36.7 °C)] 98.1 °F (36.7 °C)  Heart Rate:  [67-86] 72  Resp:  [18] 18  BP: (124-165)/(60-89) 127/60    Physical Exam    Constitutional: Awake, alert, no acute distress   Eyes: Pupils equal, sclerae anicteric, no conjunctival injection   HENT: NCAT, mucous membranes moist   Neck: Supple, no thyromegaly, no lymphadenopathy, trachea midline   Respiratory: Clear to auscultation bilaterally, nonlabored respirations    Cardiovascular: Irregularly irregular, rate controlled, no murmurs, rubs, or gallops, palpable pedal pulses bilaterally   Gastrointestinal: Positive bowel sounds, soft, nontender, nondistended   Musculoskeletal: No bilateral ankle edema, no clubbing or cyanosis to extremities   Psychiatric: Appropriate affect, cooperative   Neurologic: Oriented x 3, strength symmetric in all extremities, Cranial Nerves grossly intact to confrontation, speech clear   Skin: No rashes     Imaging:   XR Chest 1 View    Result Date: 1/2/2022  PROCEDURE: XR CHEST 1 VW  COMPARISON: 6/11/2018.  INDICATIONS: CHEST PAIN.  FINDINGS:  A single AP upright portable chest radiograph was performed.  Borderline cardiac enlargement is seen.  No acute infiltrate is appreciated.  No pleural effusion or pneumothorax is identified.  External artifacts obscure detail. The thoracic aorta is atherosclerotic.  Chronic calcified granulomatous disease involves the chest.  The patient has undergone cholecystectomy.  No significant interval change is seen since the prior study.  CONCLUSION:  No acute infiltrate is appreciated.      STEFFANIE PAULA JR, MD       Electronically Signed and Approved By: STEFFANIE PAULA JR, MD on 1/02/2022 at 23:50               Result Review    Result Review:  I have personally reviewed the results from the time of this admission to 1/3/2022 02:19 EST and agree with these findings:  [x]  Laboratory  [x]  Microbiology  [x]   Radiology  [x]  EKG/Telemetry   []  Cardiology/Vascular   []  Pathology  [x]  Old records  []  Other:      Assessment/Plan   Assessment / Plan     Assessment:   Chest pain rule out MI  Chronic atrial fibrillation, rate controlled, anticoagulated on Eliquis  Essential hypertension  CKD stage IIIb    Plan:    Admit to hospitalist service  Labs and imaging reviewed  Patient follows with Dr. Mcguire.  Gulshan Gregory was notified by the emergency department.  Sublingual nitroglycerin PRN for chest pain  We will add echocardiogram for the morning  Continue to trend troponins, serial EKGs  Morning labs to include lipid panel and A1c  EKG for recurrent chest pain  Reconcile and resume appropriate home medications    Patient's clinical course will dictate further management  Discussed with ED physician, RN        DVT prophylaxis:  No DVT prophylaxis order currently exists.    CODE STATUS:         Admission Status:  I believe this patient meets observation status.    Electronically signed by ONEYDA Lomeli, 01/03/22, 2:19 AM EST.

## 2022-01-03 NOTE — PROGRESS NOTES
[facility]   Hospitalist Progress Note  Date: 1/3/2022  Patient Name: Rupal Buchanan  : 1940  MRN: [id number]  Date of admission: 2022      Subjective   Subjective     Chief complaint: Chest pain    Summary:  81-year-old female with history of chronic atrial fibrillation, essential hypertension, CKD stage IIIb, long-term anticoagulation Eliquis, was hospitalized on 2021 with symptoms of chest pain, troponins were trended, EKG trend ordered, cardiology consulted.  Patient with features of angina, plans to pursue stress test.  Cardiology consulted.    Interval follow-up: Patient seen and examined this morning, no acute distress, no acute major overnight events, patient denies any chest pain or palpitation symptoms, she was eager for further cardiac work-up, cardiology was consulted, plans to pursue a stress test.  She denied any fevers, chills, sweats.  Telemetry reviewed, several episodes of artifact, periods of bradycardia, baseline A. fib rate controlled in 60s.    Review of systems:  All systems reviewed and negative except for chest pain on admission, chronic aches and pains of her joints.    Objective   Objective     Vitals:   Temp:  [97.3 °F (36.3 °C)-98.1 °F (36.7 °C)] 97.7 °F (36.5 °C)  Heart Rate:  [] 58  Resp:  [18] 18  BP: ()/(52-89) 103/52  Physical Exam   Constitutional: Awake, alert, no acute distress   Eyes: Pupils equal, sclerae anicteric, no conjunctival injection   HENT: NCAT, mucous membranes moist   Neck: Supple, no thyromegaly, no lymphadenopathy, trachea midline   Respiratory: Clear to auscultation bilaterally, nonlabored respirations    Cardiovascular: Irregular rate, irregular rhythm, no murmurs, rubs, or gallops, palpable pedal pulses bilaterally   Gastrointestinal: Positive bowel sounds, soft, nontender, nondistended   Musculoskeletal: No bilateral ankle edema, no clubbing or cyanosis to extremities   Psychiatric: Appropriate affect, cooperative   Neurologic:  Oriented x 3, strength symmetric in all extremities, Cranial Nerves grossly intact to confrontation, speech clear   Skin: No rashes     Result Review    Result Review:  I have personally reviewed the results from the time of this admission to 1/3/2022 16:53 EST and agree with these findings:  [x]  Laboratory   CBC    CBC 1/15/21 1/2/22 1/3/22   WBC 6.22 5.77 6.42   RBC 3.76 (A) 3.74 (A) 3.62 (A)   Hemoglobin 12.4 12.3 11.8 (A)   Hematocrit 35.9 (A) 34.4 33.5 (A)   MCV 95.5 92.0 92.5   MCH 33.0 (A) 32.9 32.6   MCHC 34.5 35.8 (A) 35.2   RDW 12.3 12.3 12.2 (A)   Platelets 227 184 172   (A) Abnormal value            BMP    BMP 1/15/21 1/2/22 1/3/22   BUN 24 33 (A) 34 (A)   Creatinine 1.55 (A) 1.67 (A) 1.49 (A)   Sodium 138 140 138   Potassium 3.7 3.6 4.4   Chloride 98 (A) 101 101   CO2 30 27.0 26.2   Calcium 9.6 9.8 9.9   (A) Abnormal value       Comments are available for some flowsheets but are not being displayed.           LIVER FUNCTION TESTS:      Lab 01/02/22  2320   TOTAL PROTEIN 7.1   ALBUMIN 4.70   GLOBULIN 2.4   ALT (SGPT) 13   AST (SGOT) 22   BILIRUBIN 0.8   ALK PHOS 124*   LIPASE 59       [x]  Microbiology No results found for: ACANTHNAEG, AFBCX, BPERTUSSISCX, BLOODCX  No results found for: BCIDPCR, CXREFLEX, CSFCX, CULTURETIS  No results found for: CULTURES, HSVCX, URCX  No results found for: EYECULTURE, GCCX, HSVCULTURE, LABHSV  No results found for: LEGIONELLA, MRSACX, MUMPSCX, MYCOPLASCX  No results found for: NOCARDIACX, STOOLCX  No results found for: THROATCX, UNSTIMCULT, URINECX, CULTURE, VZVCULTUR  No results found for: VIRALCULTU, WOUNDCX    [x]  Radiology XR Chest 1 View    Result Date: 1/2/2022  PROCEDURE: XR CHEST 1 VW  COMPARISON: 6/11/2018.  INDICATIONS: CHEST PAIN.  FINDINGS:  A single AP upright portable chest radiograph was performed.  Borderline cardiac enlargement is seen.  No acute infiltrate is appreciated.  No pleural effusion or pneumothorax is identified.  External artifacts obscure  detail. The thoracic aorta is atherosclerotic.  Chronic calcified granulomatous disease involves the chest.  The patient has undergone cholecystectomy.  No significant interval change is seen since the prior study.  CONCLUSION:  No acute infiltrate is appreciated.      STEFFANIE PAULA JR, MD       Electronically Signed and Approved By: STEFFANIE PAULA JR, MD on 1/02/2022 at 23:50               [x]  EKG/Telemetry   [x]  Cardiology/Vascular   []  Pathology  [x]  Old records  []  Other:    Assessment/Plan   Assessment / Plan     Assessment/Plan:  Assessment:  Chest pain concern for unstable angina  Chronic atrial fibrillation  Long-term anticoagulation with Eliquis  Essential hypertension  CKD stage IIIb  Chronic anemia  GERD without esophagitis  Dyslipidemia    Plan:  Labs and imaging reviewed  Dr. Mcguire from cardiology consulted, recommendations appreciated, discussed with him  Lexiscan pending  Telemetry continued  Reconciled home medications resumed accordingly  Continue Eliquis 2.5 mg p.o. twice daily  Continue Lipitor 20 mg daily  Continue atenolol 50 mg every 24 hours  Continue Protonix 40 mg daily  A.m. labs  Full code  VTE prophylaxis Eliquis  Clinical course to dictate further management  Discussed with nurse at the bedside  DVT prophylaxis:  Medical and mechanical DVT prophylaxis orders are present.    CODE STATUS:   Level Of Support Discussed With: Patient  Code Status (Patient has no pulse and is not breathing): CPR (Attempt to Resuscitate)  Medical Interventions (Patient has pulse or is breathing): Full Support        Electronically signed by Salty Freedman MD, 01/03/22, 4:53 PM EST.

## 2022-01-03 NOTE — CASE MANAGEMENT/SOCIAL WORK
Discharge Planning Assessment   [place]     Patient Name: Rupal Buchanan  MRN: [id number]  Today's Date: 1/3/2022    Admit Date: 1/2/2022     Discharge Needs Assessment     Row Name 01/03/22 1001       Living Environment    Lives With spouse    Name(s) of Who Lives With Patient  Jesse    Current Living Arrangements home/apartment/condo    Primary Care Provided by self    Family Caregiver if Needed none    Quality of Family Relationships supportive; involved; helpful    Able to Return to Prior Arrangements yes       Resource/Environmental Concerns    Resource/Environmental Concerns none    Transportation Concerns car, none       Transition Planning    Patient/Family Anticipates Transition to home with family    Patient/Family Anticipated Services at Transition none    Transportation Anticipated family or friend will provide       Discharge Needs Assessment    Readmission Within the Last 30 Days no previous admission in last 30 days    Equipment Currently Used at Home cane, straight    Concerns to be Addressed no discharge needs identified; denies needs/concerns at this time    Anticipated Changes Related to Illness none    Equipment Needed After Discharge none               Discharge Plan     Row Name 01/03/22 1002       Plan    Plan Pt lives with  and is independent, Pt uses a cane for ambulation due to right knee pain. Pt expressed concerns for Eliquis affordability- pt has been on medication plan that is no cost to her at this time. pt reports she filled out paperwork recently and is worried she may not be covered- Pt sees Dr Mcguire for cardiology- encouraged pt and  to inform MD if pt does not qualify for medication assistance and ask for samples. Pt and  deny needs at this time. CM will continue to follow.              Continued Care and Services - Admitted Since 1/2/2022    Coordination has not been started for this encounter.          Demographic Summary     Row Name 01/03/22  0958       General Information    Admission Type observation    Arrived From emergency department    Referral Source admission list     Used During This Interaction no       Contact Information    Permission Granted to Share Info With family/designee               Functional Status     Row Name 01/03/22 0958       Functional Status    Usual Activity Tolerance excellent    Current Activity Tolerance excellent       Functional Status, IADL    Medications independent    Meal Preparation independent    Housekeeping independent    Laundry independent    Shopping independent       Mental Status    General Appearance WDL WDL       Mental Status Summary    Recent Changes in Mental Status/Cognitive Functioning no changes               Psychosocial    No documentation.                Abuse/Neglect    No documentation.                Legal     Row Name 01/03/22 0959       Financial/Legal    Source of Income social security       Legal    Criminal Activity/Legal Involvement none               Substance Abuse    No documentation.                Patient Forms    No documentation.                   Kelsey York RN

## 2022-01-03 NOTE — PLAN OF CARE
Goal Outcome Evaluation: Patient had no complaints of chest pain or soa this shift.  Patient ambulated to bathroom well several times with no problems. Echo, stress test for 1/4/22. Call light within reach of the patient.

## 2022-01-03 NOTE — ED PROVIDER NOTES
"Time: 11:39 PM EST  Arrived by: private car; accompanied by spouse   Chief Complaint: CP  History provided by: pt  History is limited by: N/A     History of Present Illness:  Patient is a 81 y.o. year old female that presents to the emergency department with moderate intermittent CP that started last night. The CP is located to the center of the chest and radiates to the sides of the chest and to the back. The pain is described as pressure. She denies injury to the chest. Pain is worse with fatty foods. The pain is not worse with inspiration or exertion.     Pt has some mild SOB and diaphoresis with initial episodes. No nausea or emesis.     Pt has hx of \"leaky valve\", HTN, and hyperlipidemia. No hx of DM, DVT, PE, or smoking. No family hx of CAD. No recent travel and no recent surgeries. She had a nuclear stress test and heart cath in 2017 that were normal.     History provided by:  Patient    Similar Symptoms Previously: Spouse states that pt has had similar symptoms multiple times before.   Recently seen: not recently seen in this ED     Patient Care Team  Primary Care Provider: Ciro Lee)   Cardiologist - Riaz     Past Medical History:     Allergies   Allergen Reactions   • Nitrofurantoin GI Intolerance   • Sulfa Antibiotics Itching   • Latex Rash     Past Medical History:   Diagnosis Date   • Anemia    • Heart disease    • Hypertension    • Kidney disease      Past Surgical History:   Procedure Laterality Date   • APPENDECTOMY     • CARDIAC SURGERY     • CHOLECYSTECTOMY       History reviewed. No pertinent family history.    Home Medications:  Prior to Admission medications    Medication Sig Start Date End Date Taking? Authorizing Provider   ALPRAZolam (XANAX) 1 MG tablet Every 12 (Twelve) Hours.    ProviderYon MD   apixaban (Eliquis) 5 MG tablet tablet Every 12 (Twelve) Hours.    Provider, MD Yon   atenolol (TENORMIN) 50 MG tablet atenolol 50 mg tablet   Take 1 tablet every day by " "oral route.    Yon Vogt MD   hydroCHLOROthiazide (HYDRODIURIL) 25 MG tablet hydrochlorothiazide 25 mg tablet   Take 1 tablet every day by oral route.    Yon Vogt MD   HYDROcodone-acetaminophen (NORCO) 7.5-325 MG per tablet Every 6 (Six) Hours.    Yon Vogt MD   oxybutynin XL (DITROPAN-XL) 10 MG 24 hr tablet     Yon Vogt MD   pantoprazole (PROTONIX) 40 MG EC tablet pantoprazole 40 mg tablet,delayed release   Take 1 tablet every day by oral route.    Yon Vogt MD   raNITIdine HCl (WAL-ADAMA 75 PO) Zantac    Yon Vogt MD   rOPINIRole (REQUIP) 1 MG tablet Every 8 (Eight) Hours.    Yon Vogt MD   simvastatin (Zocor) 40 MG tablet Zocor 40 mg tablet   Take 1 tablet every day by oral route.    Yon Vogt MD        Social History:   Social History     Tobacco Use   • Smoking status: Never Smoker   • Smokeless tobacco: Current User     Types: Chew   Substance Use Topics   • Alcohol use: Never   • Drug use: Never         Review of Systems:  Review of Systems   Constitutional: Positive for diaphoresis. Negative for chills and fever.   HENT: Negative for ear discharge and nosebleeds.    Eyes: Negative for photophobia.   Respiratory: Positive for shortness of breath.    Cardiovascular: Positive for chest pain.   Gastrointestinal: Negative for diarrhea, nausea and vomiting.   Genitourinary: Negative for dysuria.   Musculoskeletal: Negative for back pain and neck pain.   Skin: Negative for rash.   Neurological: Negative for headaches.        Physical Exam:  /60   Pulse 72   Temp 98.1 °F (36.7 °C) (Oral)   Resp 18   Ht 165.1 cm (65\")   Wt 69.2 kg (152 lb 8.9 oz)   SpO2 99%   BMI 25.39 kg/m²     Physical Exam  Vitals and nursing note reviewed.   Constitutional:       General: She is not in acute distress.  HENT:      Head: Normocephalic and atraumatic.      Mouth/Throat:      Mouth: Mucous membranes are moist.   Eyes:      " Extraocular Movements: Extraocular movements intact.   Cardiovascular:      Rate and Rhythm: Normal rate and regular rhythm.      Pulses: Normal pulses.      Heart sounds: No murmur heard.       Comments: Opening click.   Pulmonary:      Effort: Pulmonary effort is normal. No accessory muscle usage, respiratory distress or retractions.      Breath sounds: Decreased breath sounds (Slightly diminished) present. No wheezing, rhonchi or rales.   Chest:      Comments: No pain with palpation of the chest wall.   Abdominal:      General: Abdomen is flat. There is no distension.      Palpations: Abdomen is soft. There is no mass.      Tenderness: There is no abdominal tenderness. There is no guarding or rebound.      Comments: No rigidity. No prominent aortic pulsations.    Musculoskeletal:         General: Normal range of motion.      Cervical back: Normal range of motion and neck supple.      Right lower leg: No edema.      Left lower leg: No edema.      Comments: No calf tenderness. Negative Tushar's sign.   Skin:     General: Skin is warm and dry.   Neurological:      Mental Status: She is alert. Mental status is at baseline.                Medications in the Emergency Department:  Medications   sodium chloride 0.9 % flush 10 mL (has no administration in time range)   aspirin chewable tablet 324 mg (324 mg Oral Not Given 1/2/22 2326)   famotidine (PEPCID) injection 20 mg (20 mg Intravenous Given 1/3/22 0114)        Labs  Lab Results (last 24 hours)     Procedure Component Value Units Date/Time    POC Troponin I [[id number]]  (Normal) Collected: 01/02/22 2319    Specimen: Blood Updated: 01/02/22 2333     Troponin I 0.00 ng/mL      Comment: Serial Number: [id number]:  [id number]       POC Troponin I with Hold Tube [[id number]] Collected: 01/02/22 2320    Specimen: Blood Updated: 01/03/22 0020    Narrative:      The following orders were created for panel order POC Troponin I with Hold Tube.  Procedure                                Abnormality         Status                     ---------                               -----------         ------                     POC Troponin I[[id number]]                                                              HOLD Troponin-I Tube[[id number]]                             Final result                 Please view results for these tests on the individual orders.    CBC & Differential [[id number]]  (Abnormal) Collected: 01/02/22 2320    Specimen: Blood Updated: 01/03/22 0036    Narrative:      The following orders were created for panel order CBC & Differential.  Procedure                               Abnormality         Status                     ---------                               -----------         ------                     CBC Auto Differential[[id number]]        Abnormal            Final result               Scan Slide[[id number]]                                       Final result               Path Consult Reflex[[id number]]                              Final result                 Please view results for these tests on the individual orders.    Comprehensive Metabolic Panel [[id number]]  (Abnormal) Collected: 01/02/22 2320    Specimen: Blood Updated: 01/03/22 0002     Glucose 105 mg/dL      BUN 33 mg/dL      Creatinine 1.67 mg/dL      Sodium 140 mmol/L      Potassium 3.6 mmol/L      Chloride 101 mmol/L      CO2 27.0 mmol/L      Calcium 9.8 mg/dL      Total Protein 7.1 g/dL      Albumin 4.70 g/dL      ALT (SGPT) 13 U/L      AST (SGOT) 22 U/L      Alkaline Phosphatase 124 U/L      Total Bilirubin 0.8 mg/dL      eGFR Non African Amer 29 mL/min/1.73      Globulin 2.4 gm/dL      A/G Ratio 2.0 g/dL      BUN/Creatinine Ratio 19.8     Anion Gap 12.0 mmol/L     Narrative:      GFR Normal >60  Chronic Kidney Disease <60  Kidney Failure <15      Lipase [[id number]]  (Normal) Collected: 01/02/22 2320    Specimen: Blood Updated: 01/03/22 0002     Lipase 59 U/L     BNP [[id number]]  (Abnormal) Collected:  01/02/22 2320    Specimen: Blood Updated: 01/03/22 0001     proBNP 3,915.0 pg/mL     Narrative:      Among patients with dyspnea, NT-proBNP is highly sensitive for the detection of acute congestive heart failure. In addition NT-proBNP of <300 pg/ml effectively rules out acute congestive heart failure with 99% negative predictive value.    Results may be falsely decreased if patient taking Biotin.      Magnesium [[id number]]  (Normal) Collected: 01/02/22 2320    Specimen: Blood Updated: 01/03/22 0002     Magnesium 1.8 mg/dL     CK Total & CKMB [[id number]]  (Normal) Collected: 01/02/22 2320    Specimen: Blood Updated: 01/03/22 0002     CKMB 3.51 ng/mL      Creatine Kinase 120 U/L     Narrative:      CKMB results may be falsely decreased if patient taking Biotin.    CBC Auto Differential [[id number]]  (Abnormal) Collected: 01/02/22 2320    Specimen: Blood Updated: 01/03/22 0036     WBC 5.77 10*3/mm3      RBC 3.74 10*6/mm3      Hemoglobin 12.3 g/dL      Hematocrit 34.4 %      MCV 92.0 fL      MCH 32.9 pg      MCHC 35.8 g/dL      RDW 12.3 %      RDW-SD 42.3 fl      MPV 9.8 fL      Platelets 184 10*3/mm3      Neutrophil % 43.2 %      Lymphocyte % 40.7 %      Monocyte % 10.7 %      Eosinophil % 4.5 %      Basophil % 0.7 %      Immature Grans % 0.2 %      Neutrophils, Absolute 2.49 10*3/mm3      Lymphocytes, Absolute 2.35 10*3/mm3      Monocytes, Absolute 0.62 10*3/mm3      Eosinophils, Absolute 0.26 10*3/mm3      Basophils, Absolute 0.04 10*3/mm3      Immature Grans, Absolute 0.01 10*3/mm3      nRBC 0.0 /100 WBC     Scan Slide [[id number]] Collected: 01/02/22 2320    Specimen: Blood Updated: 01/03/22 0036     RBC Agglutination Mod/2+     WBC Morphology Normal     Platelet Morphology Normal    Path Consult Reflex [[id number]] Collected: 01/02/22 2320    Specimen: Blood Updated: 01/03/22 0036     Pathology Review Yes    POC Troponin I [[id number]]  (Normal) Collected: 01/03/22 0113    Specimen: Blood Updated: 01/03/22 0125      Troponin I 0.01 ng/mL      Comment: Serial Number: [id number]:  [id number]              Imaging:  XR Chest 1 View    Result Date: 1/2/2022  PROCEDURE: XR CHEST 1 VW  COMPARISON: 6/11/2018.  INDICATIONS: CHEST PAIN.  FINDINGS:  A single AP upright portable chest radiograph was performed.  Borderline cardiac enlargement is seen.  No acute infiltrate is appreciated.  No pleural effusion or pneumothorax is identified.  External artifacts obscure detail. The thoracic aorta is atherosclerotic.  Chronic calcified granulomatous disease involves the chest.  The patient has undergone cholecystectomy.  No significant interval change is seen since the prior study.  CONCLUSION:  No acute infiltrate is appreciated.      STEFFANIE PAULA JR, MD       Electronically Signed and Approved By: STEFFANIE PAULA JR, MD on 1/02/2022 at 23:50               Procedures:  Procedures    Progress  ED Course as of 01/03/22 0222   Mon Jan 03, 2022   0029 EKG:    Rhythm: Atrial fibrillation  Rate: 77  Intervals: Normal QT interval  T-wave: There is some baseline artifact nonspecific T wave flattening in I, aVL  ST Segment: There is no pathological ST elevation or ST depression  Interventricular conduction delay present    EKG Comparison: EKG is changed from EKG performed June 11, 2018, there is new onset atrial fibrillation    Interpreted by me   [SD]      ED Course User Index  [SD] Rj Ch,                          HEART Score (for prediction of 6-week risk of major adverse cardiac event) reviewed and/or performed as part of the patient evaluation and treatment planning process.  The result associated with this review/performance is: 3    Wells' Criteria (for pulmonary embolism) reviewed and/or performed as part of the patient evaluation and treatment planning process.  The result associated with this review/performance is: 0        Medical Decision Making:  MDM  Number of Diagnoses or Management Options  Chest pain, unspecified  type  Paroxysmal atrial fibrillation (HCC)  Renal insufficiency  Diagnosis management comments:     The patient was a rather poor historian.  Patient does have a moderate story for unstable angina with chest pain that radiates across her chest.  The patient was very nonspecific difficult whether to determine whether she had other symptoms.  Patient does have a history of hypertension and high cholesterol.  The patient's EKG demonstrated atrial fibrillation here.  There were no other acute changes.  The patient's first troponin was normal.  The previous EKG for comparison was was a sinus rhythm.  However, when questioned initially the patient states that she does not have a history of atrial fibrillation.  However, the patient states that she has been shocked in the past and that is why she is on Eliquis.  I do not think this is new onset atrial fibrillation but rather could be paroxysmal atrial fibrillation.  The patient's rate is controlled at this time.  The patient will subsequently be admitted to the hospital for further evaluation of the chest pain.       Amount and/or Complexity of Data Reviewed  Clinical lab tests: reviewed  Tests in the radiology section of CPT®: reviewed  Tests in the medicine section of CPT®: reviewed  Discuss the patient with other providers: yes (I discussed the case with Dr. Gulshan Gregory, who is on-call for the patient's cardiologist.  Due to the patient's age and being a difficult historian he thinks to be better to admit the patient to the hospital overnight for serial troponins and EKGs.  He request the hospitalist for admission    I discussed the case with the hospitalist who is agreeable for admission the patient will be seen and evaluated in the emergency department and admission orders will be written)                 Final diagnoses:   Chest pain, unspecified type   Paroxysmal atrial fibrillation (HCC)   Renal insufficiency        Disposition:  ED Disposition     ED Disposition  Condition Comment    Decision to Admit  Level of Care: Telemetry [5]   Diagnosis: Chest pain [544169]   Admitting Physician: MELLY LORD [[id number]]   Attending Physician: MELLY LORD [[id number]]              Documentation assistance provided by Chitra Liao acting as scribe for Rj Ch DO. Information recorded by the scribe was done at my direction and has been verified and validated by me.         Chitra Liao  01/03/22 0001       Rj Ch DO  01/06/22 0505

## 2022-01-04 ENCOUNTER — APPOINTMENT (OUTPATIENT)
Dept: NUCLEAR MEDICINE | Facility: HOSPITAL | Age: 82
End: 2022-01-04

## 2022-01-04 ENCOUNTER — TELEPHONE (OUTPATIENT)
Dept: ORTHOPEDIC SURGERY | Facility: CLINIC | Age: 82
End: 2022-01-04

## 2022-01-04 LAB
ALBUMIN SERPL-MCNC: 4.7 G/DL (ref 3.5–5.2)
ALP SERPL-CCNC: 117 U/L (ref 39–117)
ALT SERPL W P-5'-P-CCNC: 13 U/L (ref 1–33)
ANION GAP SERPL CALCULATED.3IONS-SCNC: 12.3 MMOL/L (ref 5–15)
AST SERPL-CCNC: 22 U/L (ref 1–32)
BASOPHILS # BLD AUTO: 0.04 10*3/MM3 (ref 0–0.2)
BASOPHILS NFR BLD AUTO: 0.6 % (ref 0–1.5)
BH CV IMMEDIATE POST TECH DATA BLOOD PRESSURE: NORMAL MMHG
BH CV IMMEDIATE POST TECH DATA HEART RATE: 82 BPM
BH CV IMMEDIATE POST TECH DATA OXYGEN SATS: 98 %
BH CV REST NUCLEAR ISOTOPE DOSE: 10.1 MCI
BH CV SIX MINUTE RECOVERY TECH DATA BLOOD PRESSURE: NORMAL
BH CV SIX MINUTE RECOVERY TECH DATA HEART RATE: 77 BPM
BH CV SIX MINUTE RECOVERY TECH DATA OXYGEN SATURATION: 98 %
BH CV SIX MINUTE RECOVERY TECH DATA SYMPTOMS: NORMAL
BH CV STRESS BP STAGE 1: NORMAL
BH CV STRESS COMMENTS STAGE 1: NORMAL
BH CV STRESS DOSE REGADENOSON STAGE 1: 0.4
BH CV STRESS DURATION MIN STAGE 1: 0
BH CV STRESS DURATION SEC STAGE 1: 10
BH CV STRESS HR STAGE 1: 60
BH CV STRESS NUCLEAR ISOTOPE DOSE: 37.2 MCI
BH CV STRESS O2 STAGE 1: 99
BH CV STRESS PROTOCOL 1: NORMAL
BH CV STRESS RECOVERY BP: NORMAL MMHG
BH CV STRESS RECOVERY HR: 77 BPM
BH CV STRESS RECOVERY O2: 98 %
BH CV STRESS STAGE 1: 1
BH CV THREE MINUTE POST TECH DATA BLOOD PRESSURE: NORMAL MMHG
BH CV THREE MINUTE POST TECH DATA HEART RATE: 84 BPM
BH CV THREE MINUTE POST TECH DATA OXYGEN SATURATION: 98 %
BILIRUB CONJ SERPL-MCNC: 0.3 MG/DL (ref 0–0.3)
BILIRUB INDIRECT SERPL-MCNC: 1.2 MG/DL
BILIRUB SERPL-MCNC: 1.5 MG/DL (ref 0–1.2)
BUN SERPL-MCNC: 33 MG/DL (ref 8–23)
BUN/CREAT SERPL: 17.6 (ref 7–25)
CALCIUM SPEC-SCNC: 9.6 MG/DL (ref 8.6–10.5)
CHLORIDE SERPL-SCNC: 100 MMOL/L (ref 98–107)
CO2 SERPL-SCNC: 26.7 MMOL/L (ref 22–29)
CREAT SERPL-MCNC: 1.88 MG/DL (ref 0.57–1)
DEPRECATED RDW RBC AUTO: 40.4 FL (ref 37–54)
EOSINOPHIL # BLD AUTO: 0.28 10*3/MM3 (ref 0–0.4)
EOSINOPHIL NFR BLD AUTO: 4.2 % (ref 0.3–6.2)
ERYTHROCYTE [DISTWIDTH] IN BLOOD BY AUTOMATED COUNT: 12.3 % (ref 12.3–15.4)
GFR SERPL CREATININE-BSD FRML MDRD: 26 ML/MIN/1.73
GLUCOSE SERPL-MCNC: 80 MG/DL (ref 65–99)
HCT VFR BLD AUTO: 33.7 % (ref 34–46.6)
HGB BLD-MCNC: 12 G/DL (ref 12–15.9)
IMM GRANULOCYTES # BLD AUTO: 0.01 10*3/MM3 (ref 0–0.05)
IMM GRANULOCYTES NFR BLD AUTO: 0.1 % (ref 0–0.5)
LV EF NUC BP: 57 %
LYMPHOCYTES # BLD AUTO: 2.55 10*3/MM3 (ref 0.7–3.1)
LYMPHOCYTES NFR BLD AUTO: 38.2 % (ref 19.6–45.3)
MAGNESIUM SERPL-MCNC: 1.8 MG/DL (ref 1.6–2.4)
MAXIMAL PREDICTED HEART RATE: 138 BPM
MCH RBC QN AUTO: 32.8 PG (ref 26.6–33)
MCHC RBC AUTO-ENTMCNC: 35.6 G/DL (ref 31.5–35.7)
MCV RBC AUTO: 92.1 FL (ref 79–97)
MONOCYTES # BLD AUTO: 0.67 10*3/MM3 (ref 0.1–0.9)
MONOCYTES NFR BLD AUTO: 10 % (ref 5–12)
NEUTROPHILS NFR BLD AUTO: 3.12 10*3/MM3 (ref 1.7–7)
NEUTROPHILS NFR BLD AUTO: 46.9 % (ref 42.7–76)
NRBC BLD AUTO-RTO: 0 /100 WBC (ref 0–0.2)
PERCENT MAX PREDICTED HR: 68.12 %
PHOSPHATE SERPL-MCNC: 4 MG/DL (ref 2.5–4.5)
PLATELET # BLD AUTO: 177 10*3/MM3 (ref 140–450)
PMV BLD AUTO: 9.9 FL (ref 6–12)
POTASSIUM SERPL-SCNC: 3.3 MMOL/L (ref 3.5–5.2)
PROT SERPL-MCNC: 7.2 G/DL (ref 6–8.5)
QT INTERVAL: 551 MS
RBC # BLD AUTO: 3.66 10*6/MM3 (ref 3.77–5.28)
SODIUM SERPL-SCNC: 139 MMOL/L (ref 136–145)
STRESS BASELINE BP: NORMAL MMHG
STRESS BASELINE HR: 59 BPM
STRESS O2 SAT REST: 99 %
STRESS PERCENT HR: 80 %
STRESS POST O2 SAT PEAK: 99 %
STRESS POST PEAK BP: NORMAL MMHG
STRESS POST PEAK HR: 94 BPM
STRESS TARGET HR: 117 BPM
WBC NRBC COR # BLD: 6.67 10*3/MM3 (ref 3.4–10.8)

## 2022-01-04 PROCEDURE — 25010000002 REGADENOSON 0.4 MG/5ML SOLUTION

## 2022-01-04 PROCEDURE — 84100 ASSAY OF PHOSPHORUS: CPT | Performed by: FAMILY MEDICINE

## 2022-01-04 PROCEDURE — 80048 BASIC METABOLIC PNL TOTAL CA: CPT | Performed by: FAMILY MEDICINE

## 2022-01-04 PROCEDURE — G0378 HOSPITAL OBSERVATION PER HR: HCPCS

## 2022-01-04 PROCEDURE — 94799 UNLISTED PULMONARY SVC/PX: CPT

## 2022-01-04 PROCEDURE — 78452 HT MUSCLE IMAGE SPECT MULT: CPT

## 2022-01-04 PROCEDURE — 93017 CV STRESS TEST TRACING ONLY: CPT

## 2022-01-04 PROCEDURE — 93005 ELECTROCARDIOGRAM TRACING: CPT | Performed by: PHYSICIAN ASSISTANT

## 2022-01-04 PROCEDURE — A9502 TC99M TETROFOSMIN: HCPCS | Performed by: FAMILY MEDICINE

## 2022-01-04 PROCEDURE — 0 TECHNETIUM TETROFOSMIN KIT: Performed by: FAMILY MEDICINE

## 2022-01-04 PROCEDURE — 80076 HEPATIC FUNCTION PANEL: CPT | Performed by: FAMILY MEDICINE

## 2022-01-04 PROCEDURE — 99233 SBSQ HOSP IP/OBS HIGH 50: CPT | Performed by: FAMILY MEDICINE

## 2022-01-04 PROCEDURE — 93010 ELECTROCARDIOGRAM REPORT: CPT | Performed by: INTERNAL MEDICINE

## 2022-01-04 PROCEDURE — 85025 COMPLETE CBC W/AUTO DIFF WBC: CPT | Performed by: FAMILY MEDICINE

## 2022-01-04 PROCEDURE — 83735 ASSAY OF MAGNESIUM: CPT | Performed by: FAMILY MEDICINE

## 2022-01-04 RX ORDER — POTASSIUM CHLORIDE 750 MG/1
40 CAPSULE, EXTENDED RELEASE ORAL ONCE
Status: COMPLETED | OUTPATIENT
Start: 2022-01-04 | End: 2022-01-04

## 2022-01-04 RX ORDER — ATORVASTATIN CALCIUM 20 MG/1
20 TABLET, FILM COATED ORAL NIGHTLY
Status: DISCONTINUED | OUTPATIENT
Start: 2022-01-04 | End: 2022-01-07 | Stop reason: HOSPADM

## 2022-01-04 RX ADMIN — ATENOLOL 50 MG: 50 TABLET ORAL at 11:11

## 2022-01-04 RX ADMIN — ROPINIROLE HYDROCHLORIDE 1 MG: 1 TABLET, FILM COATED ORAL at 21:39

## 2022-01-04 RX ADMIN — APIXABAN 2.5 MG: 2.5 TABLET, FILM COATED ORAL at 11:12

## 2022-01-04 RX ADMIN — PANTOPRAZOLE SODIUM 40 MG: 40 TABLET, DELAYED RELEASE ORAL at 05:03

## 2022-01-04 RX ADMIN — POTASSIUM CHLORIDE 40 MEQ: 750 CAPSULE, EXTENDED RELEASE ORAL at 17:56

## 2022-01-04 RX ADMIN — SODIUM CHLORIDE, PRESERVATIVE FREE 10 ML: 5 INJECTION INTRAVENOUS at 21:39

## 2022-01-04 RX ADMIN — REGADENOSON 0.4 MG: 0.08 INJECTION, SOLUTION INTRAVENOUS at 09:49

## 2022-01-04 RX ADMIN — TETROFOSMIN 1 DOSE: 1.38 INJECTION, POWDER, LYOPHILIZED, FOR SOLUTION INTRAVENOUS at 07:43

## 2022-01-04 RX ADMIN — ALPRAZOLAM 1 MG: 1 TABLET ORAL at 11:11

## 2022-01-04 RX ADMIN — ATORVASTATIN CALCIUM 20 MG: 20 TABLET, FILM COATED ORAL at 21:39

## 2022-01-04 RX ADMIN — ALPRAZOLAM 1 MG: 1 TABLET ORAL at 21:39

## 2022-01-04 RX ADMIN — TETROFOSMIN 1 DOSE: 1.38 INJECTION, POWDER, LYOPHILIZED, FOR SOLUTION INTRAVENOUS at 09:49

## 2022-01-04 RX ADMIN — SODIUM CHLORIDE, PRESERVATIVE FREE 10 ML: 5 INJECTION INTRAVENOUS at 11:11

## 2022-01-04 NOTE — TELEPHONE ENCOUNTER
INCOMING REFERRAL FORM [facility] HERNANDEZ FOR FOR RIGHT KNEE PAIN - MRI OF RIGHT KNEE - 08/12/202 - NO SURGERIES - SECOND OPINION - ORIGINALLY SEEN BY ARSH MILLER ON 10-. RECORDS FROM [facility]FFER[facility] NOW SCANNED INTO THE PATIENTS CHART

## 2022-01-04 NOTE — PROGRESS NOTES
[facility]   Hospitalist Progress Note  Date: 2022  Patient Name: Rupal Buchanan  : 1940  MRN: [id number]  Date of admission: 2022      Subjective   Subjective     Chief complaint: Chest pain     Summary:  81-year-old female with history of chronic atrial fibrillation, essential hypertension, CKD stage IIIb, long-term anticoagulation Eliquis, was hospitalized on 2021 with symptoms of chest pain, troponins were trended, EKG trend ordered, cardiology consulted.  Patient with features of angina, plans to pursue stress test.  Cardiology consulted.     Interval follow-up: Patient seen and examined this morning, no acute distress, no acute major overnight events, patient denied any chest pain or palpitations, seen after cardiac stress test, results unavailable when I saw her, she denied any fevers, chills, sweats, shortness of breath, increased work of breathing with exertion.  She tolerated stress test today well.  Potassium low, supplement ordered.  Post procedure reviewed result for myocardial ischemia.  Plans to pursue cardiac stress test.  Telemetry reviewed, several PVCs Baseline A. fib in the 60s.    Review of systems:  All systems reviewed and negative except for generalized fatigue, as well as chronic aches and pains of her joints.    Objective   Objective     Vitals:   Temp:  [97.9 °F (36.6 °C)-98.4 °F (36.9 °C)] 98 °F (36.7 °C)  Heart Rate:  [54-68] 54  Resp:  [16-18] 16  BP: (105-150)/(56-78) 105/56  Physical Exam    Constitutional: Awake, alert, no acute distress              Eyes: Pupils equal, sclerae anicteric, no conjunctival injection              HENT: NCAT, mucous membranes moist              Neck: Supple, full range of motion              Respiratory: Clear to auscultation bilaterally, nonlabored respirations               Cardiovascular: Irregular rate, irregular rhythm, no murmurs, rubs, or gallops, palpable pedal pulses bilaterally              Gastrointestinal: Positive  bowel sounds, soft, nontender, nondistended              Musculoskeletal: No bilateral ankle edema, no clubbing or cyanosis to extremities              Psychiatric: Appropriate affect, cooperative              Neurologic: Oriented x 3, strength symmetric in all extremities, Cranial Nerves grossly intact to confrontation, speech clear              Skin: No rashes     Result Review    Result Review:  I have personally reviewed the results from the time of this admission to 1/4/2022 18:25 EST and agree with these findings:  [x]  Laboratory   CBC    CBC 1/2/22 1/3/22 1/4/22   WBC 5.77 6.42 6.67   RBC 3.74 (A) 3.62 (A) 3.66 (A)   Hemoglobin 12.3 11.8 (A) 12.0   Hematocrit 34.4 33.5 (A) 33.7 (A)   MCV 92.0 92.5 92.1   MCH 32.9 32.6 32.8   MCHC 35.8 (A) 35.2 35.6   RDW 12.3 12.2 (A) 12.3   Platelets 184 172 177   (A) Abnormal value            BMP    BMP 1/2/22 1/3/22 1/4/22   BUN 33 (A) 34 (A) 33 (A)   Creatinine 1.67 (A) 1.49 (A) 1.88 (A)   Sodium 140 138 139   Potassium 3.6 4.4 3.3 (A)   Chloride 101 101 100   CO2 27.0 26.2 26.7   Calcium 9.8 9.9 9.6   (A) Abnormal value       Comments are available for some flowsheets but are not being displayed.           LIVER FUNCTION TESTS:      Lab 01/04/22  1408 01/02/22  2320   TOTAL PROTEIN 7.2 7.1   ALBUMIN 4.70 4.70   GLOBULIN  --  2.4   ALT (SGPT) 13 13   AST (SGOT) 22 22   BILIRUBIN 1.5* 0.8   INDIRECT BILIRUBIN 1.2  --    BILIRUBIN DIRECT 0.3  --    ALK PHOS 117 124*   LIPASE  --  59       [x]  Microbiology No results found for: ACANTHNAEG, AFBCX, BPERTUSSISCX, BLOODCX  No results found for: BCIDPCR, CXREFLEX, CSFCX, CULTURETIS  No results found for: CULTURES, HSVCX, URCX  No results found for: EYECULTURE, GCCX, HSVCULTURE, LABHSV  No results found for: LEGIONELLA, MRSACX, MUMPSCX, MYCOPLASCX  No results found for: NOCARDIACX, STOOLCX  No results found for: THROATCX, UNSTIMCULT, URINECX, CULTURE, VZVCULTUR  No results found for: VIRALCULTU, WOUNDCX    [x]  Radiology XR  Chest 1 View    Result Date: 1/2/2022  PROCEDURE: XR CHEST 1 VW  COMPARISON: 6/11/2018.  INDICATIONS: CHEST PAIN.  FINDINGS:  A single AP upright portable chest radiograph was performed.  Borderline cardiac enlargement is seen.  No acute infiltrate is appreciated.  No pleural effusion or pneumothorax is identified.  External artifacts obscure detail. The thoracic aorta is atherosclerotic.  Chronic calcified granulomatous disease involves the chest.  The patient has undergone cholecystectomy.  No significant interval change is seen since the prior study.  CONCLUSION:  No acute infiltrate is appreciated.      STEFFANIE PAULA JR, MD       Electronically Signed and Approved By: STEFFANIE PAULA JR, MD on 1/02/2022 at 23:50             Stress Test With Myocardial Perfusion One Day    Result Date: 1/4/2022  · Left ventricular ejection fraction is normal. (Calculated EF = 57%). · Myocardial perfusion imaging indicates a medium-sized, moderately severe area of ischemia located in the lateral wall and basal inferior lateral wall. · Impressions are consistent with an intermediate risk study. · Findings consistent with a normal ECG stress test.        [x]  EKG/Telemetry    CARDIAC LABS:      Lab 01/03/22  0415 01/02/22  2320   PROBNP  --  3,915.0*   TROPONIN T <0.010 <0.010   PROTIME 10.7  --    INR 1.02*  --      [x]  Cardiology/Vascular   []  Pathology  [x]  Old records  []  Other:    Assessment/Plan   Assessment / Plan     Assessment/Plan:  Assessment:  Chest pain concern for unstable angina  Abnormal stress test  Chronic atrial fibrillation  Long-term anticoagulation with Eliquis  Essential hypertension  CKD stage IIIb  Chronic anemia  GERD without esophagitis  Dyslipidemia     Plan:  Labs and imaging reviewed  Dr. Mcguire from cardiology consulted, recommendations appreciated, discussed with him  Lexiscan pending cardiac cath pending  Holding Eliquis  Telemetry continued  Replacement potassium  Reconciled home medications  resumed accordingly  Continue Lipitor 20 mg daily  Continue atenolol 50 mg every 24 hours  Continue Protonix 40 mg daily  A.m. labs  Full code  VTE prophylaxis SCDs  Clinical course to dictate further management  Discussed with nurse at the bedside    DVT prophylaxis:  Mechanical DVT prophylaxis orders are present.    CODE STATUS:   Level Of Support Discussed With: Patient  Code Status (Patient has no pulse and is not breathing): CPR (Attempt to Resuscitate)  Medical Interventions (Patient has pulse or is breathing): Full Support        Electronically signed by Salty Freedman MD, 01/04/22, 6:25 PM EST.

## 2022-01-04 NOTE — PROGRESS NOTES
CARDIOLOGY  INPATIENT PROGRESS NOTE                [facility] CARE UNIT 2    1/4/2022      PATIENT IDENTIFICATION:   Name:  Rupal Buchanan      MRN:  [id number]     81 y.o.  female                 SUBJECTIVE:   Patient underwent a stress nuclear scan today which was positive for myocardial ischemia. Ejection fraction was normal 57%  Potassium is 3.3    OBJECTIVE:  Vitals:    01/04/22 0500 01/04/22 1000 01/04/22 1111 01/04/22 1241   BP:   132/58 124/65   BP Location:    Right arm   Patient Position:    Lying   Pulse:   67 68   Resp:    18   Temp:    97.9 °F (36.6 °C)   TempSrc:    Oral   SpO2:  98%  100%   Weight: 66.7 kg (147 lb 0.8 oz)      Height:               Body mass index is 24.47 kg/m².    Intake/Output Summary (Last 24 hours) at 1/4/2022 1703  Last data filed at 1/3/2022 1757  Gross per 24 hour   Intake 120 ml   Output --   Net 120 ml       Telemetry:     Physical Exam  General Appearance:   · no acute distress  · Alert and oriented x3  HENT:   · lips not cyanotic  · Atraumatic  Neck:  · No jvd   · supple  Respiratory:  · no respiratory distress  · normal breath sounds  · no rales  Cardiovascular:    · regular rhythm  · no S3, no S4   · no murmur  · no rub  · lower extremity edema: none    Skin:   · warm, dry  · No rashes      Allergies   Allergen Reactions   • Nitrofurantoin GI Intolerance   • Sulfa Antibiotics Itching   • Latex Rash       Scheduled meds:  ALPRAZolam, 1 mg, Oral, Q12H  apixaban, 2.5 mg, Oral, Q12H  aspirin, 324 mg, Oral, Once  atenolol, 50 mg, Oral, Q24H  atorvastatin, 20 mg, Oral, Nightly  pantoprazole, 40 mg, Oral, Q AM  rOPINIRole, 1 mg, Oral, Nightly  senna-docusate sodium, 2 tablet, Oral, BID  sodium chloride, 10 mL, Intravenous, Q12H      IV meds:                           Data Review:  CBC    CBC 1/2/22 1/3/22 1/4/22   WBC 5.77 6.42 6.67   RBC 3.74 (A) 3.62 (A) 3.66 (A)   Hemoglobin 12.3 11.8 (A) 12.0   Hematocrit 34.4 33.5 (A) 33.7 (A)   MCV 92.0 92.5 92.1    MCH 32.9 32.6 32.8   MCHC 35.8 (A) 35.2 35.6   RDW 12.3 12.2 (A) 12.3   Platelets 184 172 177   (A) Abnormal value            CMP    CMP 1/2/22 1/3/22 1/4/22 1/4/22      1408 1408   Glucose 105 (A) 105 (A)  80   BUN 33 (A) 34 (A)  33 (A)   Creatinine 1.67 (A) 1.49 (A)  1.88 (A)   eGFR Non African Am 29 (A) 34 (A)  26 (A)   Sodium 140 138  139   Potassium 3.6 4.4  3.3 (A)   Chloride 101 101  100   Calcium 9.8 9.9  9.6   Albumin 4.70  4.70    Total Bilirubin 0.8  1.5 (A)    Alkaline Phosphatase 124 (A)  117    AST (SGOT) 22  22    ALT (SGPT) 13  13    (A) Abnormal value       Comments are available for some flowsheets but are not being displayed.            CARDIAC LABS:      Lab 01/03/22  0415 01/02/22  2320   PROBNP  --  3,915.0*   TROPONIN T <0.010 <0.010   PROTIME 10.7  --    INR 1.02*  --         No results found for: DIGOXIN   No results found for: TSH  Results from last 7 days   Lab Units 01/03/22  0415   CHOLESTEROL mg/dL 135   HDL CHOL mg/dL 63*     Lab Results   Component Value Date    POCTROP 0.01 01/03/2022     Lab Results   Component Value Date    TROPONINT <0.010 01/03/2022   (  Lab Results   Component Value Date    MG 1.8 01/04/2022              ASSESSMENT:  Chest pain rule out MI  Chronic atrial fibrillation, rate controlled, anticoagulated on Eliquis  Essential hypertension  CKD stage IIIb  Positive stress nuclear scan  Hypokalemia        PLAN:   Potassium supplement  ExPlained the risk and benefits of the cardiac catheter core angiography. Patient understood and agreed.  DC Eliquis  Plan for cardiac cath on Friday          Hitesh Mcguire MD  1/4/2022    17:03 EST

## 2022-01-05 ENCOUNTER — APPOINTMENT (OUTPATIENT)
Dept: CARDIOLOGY | Facility: HOSPITAL | Age: 82
End: 2022-01-05

## 2022-01-05 PROBLEM — I25.110 UNSTABLE ANGINA PECTORIS DUE TO CORONARY ARTERIOSCLEROSIS (HCC): Status: ACTIVE | Noted: 2022-01-05

## 2022-01-05 LAB
BH CV ECHO MEAS - AO ROOT DIAM: 2.8 CM
BH CV ECHO MEAS - EDV(MOD-SP2): 60 ML
BH CV ECHO MEAS - EDV(MOD-SP4): 64 ML
BH CV ECHO MEAS - EF(MOD-BP): 53.6 %
BH CV ECHO MEAS - ESV(MOD-SP2): 20 ML
BH CV ECHO MEAS - ESV(MOD-SP4): 32 ML
BH CV ECHO MEAS - IVSD: 0.8 CM
BH CV ECHO MEAS - LA DIMENSION(2D): 4.4 CM
BH CV ECHO MEAS - LAT PEAK E' VEL: 9.9 CM/SEC
BH CV ECHO MEAS - LVIDD: 5.3 CM
BH CV ECHO MEAS - LVIDS: 4.1 CM
BH CV ECHO MEAS - LVOT DIAM: 2 CM
BH CV ECHO MEAS - LVPWD: 0.8 CM
BH CV ECHO MEAS - MED PEAK E' VEL: 7.72 CM/SEC
BH CV ECHO MEAS - MV A MAX VEL: 45 CM/SEC
BH CV ECHO MEAS - MV DEC TIME: 169 MSEC
BH CV ECHO MEAS - MV E MAX VEL: 89 CM/SEC
BH CV ECHO MEAS - MV E/A: 2
BH CV ECHO MEAS - RVDD: 2.9 CM
BH CV ECHO MEAS - TAPSE (>1.6): 1.46 CM
BH CV ECHO MEASUREMENTS AVERAGE E/E' RATIO: 10.1
IVRT: 85 MSEC
MAXIMAL PREDICTED HEART RATE: 138 BPM
STRESS TARGET HR: 117 BPM

## 2022-01-05 PROCEDURE — 99232 SBSQ HOSP IP/OBS MODERATE 35: CPT | Performed by: FAMILY MEDICINE

## 2022-01-05 PROCEDURE — 94799 UNLISTED PULMONARY SVC/PX: CPT

## 2022-01-05 PROCEDURE — 93306 TTE W/DOPPLER COMPLETE: CPT

## 2022-01-05 RX ADMIN — ATENOLOL 50 MG: 50 TABLET ORAL at 08:46

## 2022-01-05 RX ADMIN — SENNOSIDES AND DOCUSATE SODIUM 2 TABLET: 50; 8.6 TABLET ORAL at 08:46

## 2022-01-05 RX ADMIN — SODIUM CHLORIDE, PRESERVATIVE FREE 10 ML: 5 INJECTION INTRAVENOUS at 08:46

## 2022-01-05 RX ADMIN — ALPRAZOLAM 1 MG: 1 TABLET ORAL at 20:09

## 2022-01-05 RX ADMIN — ATORVASTATIN CALCIUM 20 MG: 20 TABLET, FILM COATED ORAL at 20:09

## 2022-01-05 RX ADMIN — PANTOPRAZOLE SODIUM 40 MG: 40 TABLET, DELAYED RELEASE ORAL at 06:02

## 2022-01-05 RX ADMIN — ROPINIROLE HYDROCHLORIDE 1 MG: 1 TABLET, FILM COATED ORAL at 20:09

## 2022-01-05 RX ADMIN — SODIUM CHLORIDE, PRESERVATIVE FREE 10 ML: 5 INJECTION INTRAVENOUS at 21:22

## 2022-01-05 RX ADMIN — ALPRAZOLAM 1 MG: 1 TABLET ORAL at 08:46

## 2022-01-05 NOTE — PLAN OF CARE
Goal Outcome Evaluation:  Plan of Care Reviewed With: patient        Progress: no change     No c/o chest pain this shift. Hold eliquis and plans to have cardiac cath Friday per cardiologist. GISSELL Mcdaniel RN

## 2022-01-05 NOTE — PROGRESS NOTES
[facility]   Hospitalist Progress Note  Date: 2022  Patient Name: Rupal Buchanan  : 1940  MRN: [id number]  Date of admission: 2022      Subjective   Subjective     Chief complaint: Chest pain     Summary:  81-year-old female with history of chronic atrial fibrillation, essential hypertension, CKD stage IIIb, long-term anticoagulation Eliquis, was hospitalized on 2021 with symptoms of chest pain, troponins were trended, EKG trend ordered, cardiology consulted.  Patient with features of angina, plans to pursue stress test.  Cardiology consulted.  Abnormal stress test, plans for cardiac cath, Eliquis held     Interval follow-up:  Patient seen and examined this morning, no acute distress, no acute major overnight events, unfortunately patient stress test was abnormal and she had medium sized moderately severe area of ischemia located in the lateral wall and basal inferior lateral wall, she had intermediate risk study, there are plans to pursue cardiac cath but unfortunately cannot be done until 2022 as Eliquis has to be held for 72 hours.  She denies chest pain or palpitations.  She remains in good spirits.  She is very fatigued.  Her echocardiogram is reviewed she has moderate mitral valve regurgitation, EF 53.6%.  She has mild aortic valve regurgitation.  Telemetry reviewed, few PVCs, baseline A. fib rate controlled in the 60s.    Review of systems:  All systems reviewed and negative except for generalized fatigue, as well as chronic aches and pains of her joints.     Objective   Objective     Vitals:   Temp:  [97.3 °F (36.3 °C)-98.1 °F (36.7 °C)] 97.3 °F (36.3 °C)  Heart Rate:  [64-68] 64  Resp:  [16-18] 18  BP: (111-149)/(55-66) 119/61  Physical Exam                        Constitutional: Awake, alert, no acute distress, pleasant mood              Eyes: Pupils equal, sclerae anicteric, no conjunctival injection              HENT: NCAT, mucous membranes moist              Neck: Supple,  full range of motion              Respiratory: Clear to auscultation bilaterally, nonlabored respirations               Cardiovascular: Irregular rate, irregular rhythm, no murmurs, rubs, or gallops, palpable pedal pulses bilaterally              Gastrointestinal: Positive bowel sounds, soft, nontender, nondistended              Musculoskeletal: No bilateral ankle edema, no clubbing or cyanosis to extremities              Psychiatric: Appropriate affect, cooperative              Neurologic: Oriented x 3, strength symmetric in all extremities, Cranial Nerves grossly intact to confrontation, speech clear              Skin: No rashes    Result Review    Result Review:  I have personally reviewed the results from the time of this admission to 1/5/2022 17:45 EST and agree with these findings:  [x]  Laboratory   CBC    CBC 1/2/22 1/3/22 1/4/22   WBC 5.77 6.42 6.67   RBC 3.74 (A) 3.62 (A) 3.66 (A)   Hemoglobin 12.3 11.8 (A) 12.0   Hematocrit 34.4 33.5 (A) 33.7 (A)   MCV 92.0 92.5 92.1   MCH 32.9 32.6 32.8   MCHC 35.8 (A) 35.2 35.6   RDW 12.3 12.2 (A) 12.3   Platelets 184 172 177   (A) Abnormal value            BMP    BMP 1/2/22 1/3/22 1/4/22   BUN 33 (A) 34 (A) 33 (A)   Creatinine 1.67 (A) 1.49 (A) 1.88 (A)   Sodium 140 138 139   Potassium 3.6 4.4 3.3 (A)   Chloride 101 101 100   CO2 27.0 26.2 26.7   Calcium 9.8 9.9 9.6   (A) Abnormal value       Comments are available for some flowsheets but are not being displayed.           LIVER FUNCTION TESTS:      Lab 01/04/22  1408 01/02/22  2320   TOTAL PROTEIN 7.2 7.1   ALBUMIN 4.70 4.70   GLOBULIN  --  2.4   ALT (SGPT) 13 13   AST (SGOT) 22 22   BILIRUBIN 1.5* 0.8   INDIRECT BILIRUBIN 1.2  --    BILIRUBIN DIRECT 0.3  --    ALK PHOS 117 124*   LIPASE  --  59       [x]  Microbiology No results found for: ACANTHNAEG, AFBCX, BPERTUSSISCX, BLOODCX  No results found for: BCIDPCR, CXREFLEX, CSFCX, CULTURETIS  No results found for: CULTURES, HSVCX, URCX  No results found for: EYECULTURE,  GCCX, HSVCULTURE, LABHSV  No results found for: LEGIONELLA, MRSACX, MUMPSCX, MYCOPLASCX  No results found for: NOCARDIACX, STOOLCX  No results found for: THROATCX, UNSTIMCULT, URINECX, CULTURE, VZVCULTUR  No results found for: VIRALCULTU, WOUNDCX    [x]  Radiology Adult Transthoracic Echo Complete W/ Cont if Necessary Per Protocol    Result Date: 1/5/2022  · Calculated left ventricular EF = 53.6% Estimated left ventricular EF was in agreement with the calculated left ventricular EF. Left ventricular systolic function is normal. · Moderate mitral valve regurgitation is present. · The aortic valve exhibits sclerosis. Mild aortic valve regurgitation is present. · The left atrial cavity is mild to moderately dilated      XR Chest 1 View    Result Date: 1/2/2022  PROCEDURE: XR CHEST 1 VW  COMPARISON: 6/11/2018.  INDICATIONS: CHEST PAIN.  FINDINGS:  A single AP upright portable chest radiograph was performed.  Borderline cardiac enlargement is seen.  No acute infiltrate is appreciated.  No pleural effusion or pneumothorax is identified.  External artifacts obscure detail. The thoracic aorta is atherosclerotic.  Chronic calcified granulomatous disease involves the chest.  The patient has undergone cholecystectomy.  No significant interval change is seen since the prior study.  CONCLUSION:  No acute infiltrate is appreciated.      STEFFANIE PAULA JR, MD       Electronically Signed and Approved By: STEFFANIE PAULA JR, MD on 1/02/2022 at 23:50             Stress Test With Myocardial Perfusion One Day    Result Date: 1/4/2022  · Left ventricular ejection fraction is normal. (Calculated EF = 57%). · Myocardial perfusion imaging indicates a medium-sized, moderately severe area of ischemia located in the lateral wall and basal inferior lateral wall. · Impressions are consistent with an intermediate risk study. · Findings consistent with a normal ECG stress test.        [x]  EKG/Telemetry   [x]  Cardiology/Vascular   []   Pathology  [x]  Old records  []  Other:    Assessment/Plan   Assessment / Plan     Assessment/Plan:  Assessment:  Chest pain concern for unstable angina  Abnormal stress test  Chronic atrial fibrillation  Long-term anticoagulation with Eliquis  Essential hypertension  CKD stage IIIb  Chronic anemia  GERD without esophagitis  Dyslipidemia     Plan:  Labs and imaging reviewed  Dr. Mcguire from cardiology consulted, recommendations appreciated, discussed with him  Cardiac cath on 1/7/2021  Hold Eliquis  Telemetry continued  Reconciled home medications resumed accordingly  Continue Lipitor 20 mg daily  Continue atenolol 50 mg every 24 hours  Continue Protonix 40 mg daily  A.m. labs  Full code  VTE prophylaxis SCDs  Clinical course to dictate further management  Discussed with nurse at the bedside  Change status to inpatient, unstable angina work-up, abnormal stress test, needs cardiac cath and cardiac monitoring, adverse cardiac risk     DVT prophylaxis:  Mechanical DVT prophylaxis orders are present.    CODE STATUS:   Level Of Support Discussed With: Patient  Code Status (Patient has no pulse and is not breathing): CPR (Attempt to Resuscitate)  Medical Interventions (Patient has pulse or is breathing): Full Support        Electronically signed by Salty Freedman MD, 01/05/22, 5:45 PM EST.

## 2022-01-05 NOTE — PROGRESS NOTES
CARDIOLOGY  INPATIENT PROGRESS NOTE                [facility] UNIT 2    1/5/2022      PATIENT IDENTIFICATION:   Name:  Rupal Buchanan      MRN:  [id number]     81 y.o.  female                 SUBJECTIVE:   No complaints  Eliquis has been stopped yesterday    OBJECTIVE:  Vitals:    01/04/22 2324 01/05/22 0427 01/05/22 0500 01/05/22 0853   BP: 122/55 111/66  118/65   BP Location: Right arm Right arm  Left arm   Patient Position: Lying Lying  Lying   Pulse: 68 66  67   Resp: 16 16  16   Temp: 98.1 °F (36.7 °C) 98.1 °F (36.7 °C)  97.7 °F (36.5 °C)   TempSrc: Oral Oral  Oral   SpO2: 99% 98%  97%   Weight:   68.1 kg (150 lb 2.1 oz)    Height:               Body mass index is 24.98 kg/m².    Intake/Output Summary (Last 24 hours) at 1/5/2022 1528  Last data filed at 1/5/2022 0853  Gross per 24 hour   Intake 360 ml   Output --   Net 360 ml       Telemetry:     Physical Exam  General Appearance:   · no acute distress  · Alert and oriented x3  HENT:   · lips not cyanotic  · Atraumatic  Neck:  · No jvd   · supple  Respiratory:  · no respiratory distress  · normal breath sounds  · no rales  Cardiovascular:    · regular rhythm  · no S3, no S4   · no murmur  · no rub  · lower extremity edema: none    Skin:   · warm, dry  · No rashes      Allergies   Allergen Reactions   • Nitrofurantoin GI Intolerance   • Sulfa Antibiotics Itching   • Latex Rash       Scheduled meds:  ALPRAZolam, 1 mg, Oral, Q12H  aspirin, 324 mg, Oral, Once  atenolol, 50 mg, Oral, Q24H  atorvastatin, 20 mg, Oral, Nightly  pantoprazole, 40 mg, Oral, Q AM  rOPINIRole, 1 mg, Oral, Nightly  senna-docusate sodium, 2 tablet, Oral, BID  sodium chloride, 10 mL, Intravenous, Q12H      IV meds:                           Data Review:  CBC    CBC 1/2/22 1/3/22 1/4/22   WBC 5.77 6.42 6.67   RBC 3.74 (A) 3.62 (A) 3.66 (A)   Hemoglobin 12.3 11.8 (A) 12.0   Hematocrit 34.4 33.5 (A) 33.7 (A)   MCV 92.0 92.5 92.1   MCH 32.9 32.6 32.8   MCHC 35.8 (A)  35.2 35.6   RDW 12.3 12.2 (A) 12.3   Platelets 184 172 177   (A) Abnormal value            CMP    CMP 1/2/22 1/3/22 1/4/22 1/4/22      1408 1408   Glucose 105 (A) 105 (A)  80   BUN 33 (A) 34 (A)  33 (A)   Creatinine 1.67 (A) 1.49 (A)  1.88 (A)   eGFR Non African Am 29 (A) 34 (A)  26 (A)   Sodium 140 138  139   Potassium 3.6 4.4  3.3 (A)   Chloride 101 101  100   Calcium 9.8 9.9  9.6   Albumin 4.70  4.70    Total Bilirubin 0.8  1.5 (A)    Alkaline Phosphatase 124 (A)  117    AST (SGOT) 22  22    ALT (SGPT) 13  13    (A) Abnormal value       Comments are available for some flowsheets but are not being displayed.            CARDIAC LABS:      Lab 01/03/22  0415 01/02/22  2320   PROBNP  --  3,915.0*   TROPONIN T <0.010 <0.010   PROTIME 10.7  --    INR 1.02*  --         No results found for: DIGOXIN   No results found for: TSH  Results from last 7 days   Lab Units 01/03/22  0415   CHOLESTEROL mg/dL 135   HDL CHOL mg/dL 63*     Lab Results   Component Value Date    POCTROP 0.01 01/03/2022     Lab Results   Component Value Date    TROPONINT <0.010 01/03/2022   (  Lab Results   Component Value Date    MG 1.8 01/04/2022     Results for orders placed during the hospital encounter of 01/02/22    Adult Transthoracic Echo Complete W/ Cont if Necessary Per Protocol    Interpretation Summary  · Calculated left ventricular EF = 53.6% Estimated left ventricular EF was in agreement with the calculated left ventricular EF. Left ventricular systolic function is normal.  · Moderate mitral valve regurgitation is present.  · The aortic valve exhibits sclerosis. Mild aortic valve regurgitation is present.  · The left atrial cavity is mild to moderately dilated           ASSESSMENT:  Chest pain rule out MI  Chronic atrial fibrillation, rate controlled, anticoagulated on Eliquis  Essential hypertension  CKD stage IIIb  Positive stress nuclear scan  Hypokalemia      PLAN:     Plan for cardiac cath on Friday          Hitesh Mcguire  MD  1/5/2022    15:28 EST

## 2022-01-05 NOTE — PLAN OF CARE
Goal Outcome Evaluation:           Progress: no change  Outcome Summary: Patient stable this shift, no changes at present. Plan for heart cath on Friday.

## 2022-01-06 LAB
ALBUMIN SERPL-MCNC: 4.1 G/DL (ref 3.5–5.2)
ALBUMIN/GLOB SERPL: 1.9 G/DL
ALP SERPL-CCNC: 129 U/L (ref 39–117)
ALT SERPL W P-5'-P-CCNC: 9 U/L (ref 1–33)
ANION GAP SERPL CALCULATED.3IONS-SCNC: 10.4 MMOL/L (ref 5–15)
ANION GAP SERPL CALCULATED.3IONS-SCNC: 8.4 MMOL/L (ref 5–15)
AST SERPL-CCNC: 17 U/L (ref 1–32)
BASOPHILS # BLD AUTO: 0.03 10*3/MM3 (ref 0–0.2)
BASOPHILS NFR BLD AUTO: 0.4 % (ref 0–1.5)
BILIRUB SERPL-MCNC: 0.5 MG/DL (ref 0–1.2)
BUN SERPL-MCNC: 32 MG/DL (ref 8–23)
BUN SERPL-MCNC: 34 MG/DL (ref 8–23)
BUN/CREAT SERPL: 19.3 (ref 7–25)
BUN/CREAT SERPL: 19.4 (ref 7–25)
CALCIUM SPEC-SCNC: 9.1 MG/DL (ref 8.6–10.5)
CALCIUM SPEC-SCNC: 9.1 MG/DL (ref 8.6–10.5)
CHLORIDE SERPL-SCNC: 106 MMOL/L (ref 98–107)
CHLORIDE SERPL-SCNC: 106 MMOL/L (ref 98–107)
CO2 SERPL-SCNC: 24.6 MMOL/L (ref 22–29)
CO2 SERPL-SCNC: 24.6 MMOL/L (ref 22–29)
CREAT SERPL-MCNC: 1.66 MG/DL (ref 0.57–1)
CREAT SERPL-MCNC: 1.75 MG/DL (ref 0.57–1)
DEPRECATED RDW RBC AUTO: 42.3 FL (ref 37–54)
EOSINOPHIL # BLD AUTO: 0.26 10*3/MM3 (ref 0–0.4)
EOSINOPHIL NFR BLD AUTO: 3.9 % (ref 0.3–6.2)
ERYTHROCYTE [DISTWIDTH] IN BLOOD BY AUTOMATED COUNT: 12.3 % (ref 12.3–15.4)
GFR SERPL CREATININE-BSD FRML MDRD: 28 ML/MIN/1.73
GFR SERPL CREATININE-BSD FRML MDRD: 30 ML/MIN/1.73
GLOBULIN UR ELPH-MCNC: 2.2 GM/DL
GLUCOSE SERPL-MCNC: 105 MG/DL (ref 65–99)
GLUCOSE SERPL-MCNC: 91 MG/DL (ref 65–99)
HCT VFR BLD AUTO: 32.5 % (ref 34–46.6)
HGB BLD-MCNC: 11.6 G/DL (ref 12–15.9)
IMM GRANULOCYTES # BLD AUTO: 0.01 10*3/MM3 (ref 0–0.05)
IMM GRANULOCYTES NFR BLD AUTO: 0.1 % (ref 0–0.5)
INR PPP: 1.01 (ref 2–3)
LYMPHOCYTES # BLD AUTO: 2.02 10*3/MM3 (ref 0.7–3.1)
LYMPHOCYTES NFR BLD AUTO: 30.2 % (ref 19.6–45.3)
MAGNESIUM SERPL-MCNC: 1.8 MG/DL (ref 1.6–2.4)
MCH RBC QN AUTO: 33.5 PG (ref 26.6–33)
MCHC RBC AUTO-ENTMCNC: 35.7 G/DL (ref 31.5–35.7)
MCV RBC AUTO: 93.9 FL (ref 79–97)
MONOCYTES # BLD AUTO: 0.78 10*3/MM3 (ref 0.1–0.9)
MONOCYTES NFR BLD AUTO: 11.7 % (ref 5–12)
NEUTROPHILS NFR BLD AUTO: 3.58 10*3/MM3 (ref 1.7–7)
NEUTROPHILS NFR BLD AUTO: 53.7 % (ref 42.7–76)
NRBC BLD AUTO-RTO: 0 /100 WBC (ref 0–0.2)
PLATELET # BLD AUTO: 170 10*3/MM3 (ref 140–450)
PMV BLD AUTO: 9.3 FL (ref 6–12)
POTASSIUM SERPL-SCNC: 3.6 MMOL/L (ref 3.5–5.2)
POTASSIUM SERPL-SCNC: 4 MMOL/L (ref 3.5–5.2)
PROT SERPL-MCNC: 6.3 G/DL (ref 6–8.5)
PROTHROMBIN TIME: 10.6 SECONDS (ref 9.4–12)
QT INTERVAL: 464 MS
QT INTERVAL: 483 MS
RBC # BLD AUTO: 3.46 10*6/MM3 (ref 3.77–5.28)
SODIUM SERPL-SCNC: 139 MMOL/L (ref 136–145)
SODIUM SERPL-SCNC: 141 MMOL/L (ref 136–145)
WBC NRBC COR # BLD: 6.68 10*3/MM3 (ref 3.4–10.8)

## 2022-01-06 PROCEDURE — 80053 COMPREHEN METABOLIC PANEL: CPT | Performed by: FAMILY MEDICINE

## 2022-01-06 PROCEDURE — 83735 ASSAY OF MAGNESIUM: CPT | Performed by: FAMILY MEDICINE

## 2022-01-06 PROCEDURE — 85025 COMPLETE CBC W/AUTO DIFF WBC: CPT | Performed by: INTERNAL MEDICINE

## 2022-01-06 PROCEDURE — 85610 PROTHROMBIN TIME: CPT | Performed by: INTERNAL MEDICINE

## 2022-01-06 PROCEDURE — 94799 UNLISTED PULMONARY SVC/PX: CPT

## 2022-01-06 PROCEDURE — 99232 SBSQ HOSP IP/OBS MODERATE 35: CPT | Performed by: FAMILY MEDICINE

## 2022-01-06 RX ORDER — SODIUM CHLORIDE, SODIUM LACTATE, POTASSIUM CHLORIDE, CALCIUM CHLORIDE 600; 310; 30; 20 MG/100ML; MG/100ML; MG/100ML; MG/100ML
50 INJECTION, SOLUTION INTRAVENOUS CONTINUOUS
Status: DISCONTINUED | OUTPATIENT
Start: 2022-01-06 | End: 2022-01-07

## 2022-01-06 RX ADMIN — SODIUM CHLORIDE, PRESERVATIVE FREE 10 ML: 5 INJECTION INTRAVENOUS at 21:50

## 2022-01-06 RX ADMIN — ALPRAZOLAM 1 MG: 1 TABLET ORAL at 09:42

## 2022-01-06 RX ADMIN — SODIUM CHLORIDE, POTASSIUM CHLORIDE, SODIUM LACTATE AND CALCIUM CHLORIDE 50 ML/HR: 600; 310; 30; 20 INJECTION, SOLUTION INTRAVENOUS at 14:23

## 2022-01-06 RX ADMIN — ATORVASTATIN CALCIUM 20 MG: 20 TABLET, FILM COATED ORAL at 21:50

## 2022-01-06 RX ADMIN — ROPINIROLE HYDROCHLORIDE 1 MG: 1 TABLET, FILM COATED ORAL at 21:50

## 2022-01-06 RX ADMIN — PANTOPRAZOLE SODIUM 40 MG: 40 TABLET, DELAYED RELEASE ORAL at 06:14

## 2022-01-06 RX ADMIN — ATENOLOL 50 MG: 50 TABLET ORAL at 09:42

## 2022-01-06 RX ADMIN — ALPRAZOLAM 1 MG: 1 TABLET ORAL at 21:50

## 2022-01-06 RX ADMIN — SODIUM CHLORIDE, PRESERVATIVE FREE 10 ML: 5 INJECTION INTRAVENOUS at 09:42

## 2022-01-06 NOTE — PLAN OF CARE
Goal Outcome Evaluation:         Pt rested comfortably overnight, no s/s of distress and no acute changes. Spouse at bedside.  Pt ambulated in kelly.  No c/o of CP overnight.  Awaiting cardiac catheterization 1/7/2021 to dictate future treatment course.  CTM

## 2022-01-06 NOTE — PLAN OF CARE
Problem: Adult Inpatient Plan of Care  Goal: Plan of Care Review  Outcome: Ongoing, Progressing  Goal: Patient-Specific Goal (Individualized)  Outcome: Ongoing, Progressing  Goal: Absence of Hospital-Acquired Illness or Injury  Outcome: Ongoing, Progressing  Intervention: Identify and Manage Fall Risk  Intervention: Prevent Skin Injury  Intervention: Prevent Infection  Goal: Optimal Comfort and Wellbeing  Outcome: Ongoing, Progressing  Goal: Readiness for Transition of Care  Outcome: Ongoing, Progressing     Problem: Hypertension Comorbidity  Goal: Blood Pressure in Desired Range  Outcome: Ongoing, Progressing  Intervention: Maintain Hypertension-Management Strategies     Problem: Pain Chronic (Persistent) (Comorbidity Management)  Goal: Acceptable Pain Control and Functional Ability  Outcome: Ongoing, Progressing  Intervention: Manage Persistent Pain  Intervention: Optimize Psychosocial Wellbeing   Goal Outcome Evaluation:

## 2022-01-06 NOTE — PROGRESS NOTES
[facility]   Hospitalist Progress Note  Date: 2022  Patient Name: Rupal Buchanan  : 1940  MRN: [id number]  Date of admission: 2022      Subjective   Subjective     Chief complaint: Chest pain     Summary:  81-year-old female with history of chronic atrial fibrillation, essential hypertension, CKD stage IIIb, long-term anticoagulation Eliquis, was hospitalized on 2021 with symptoms of chest pain, troponins were trended, EKG trend ordered, cardiology consulted.  Patient with features of angina, plans to pursue stress test.  Cardiology consulted.  Abnormal stress test, plans for cardiac cath, Eliquis held     Interval follow-up: Patient seen and examined this morning, no acute distress, no acute major overnight events, patient patiently waiting cardiac cath procedure, denies chest pain or palpitations, mobilizing the room without issue, telemetry reviewed, no acute major arrhythmic events, few PVCs, baseline A. fib rate controlled in the 60s.  Potassium corrected to 3.6.  Creatinine 1.66 down from 1.88.  Still elevated.    Review of systems:  All systems reviewed and negative except for generalized fatigue.    Objective   Objective     Vitals:   Temp:  [97.3 °F (36.3 °C)-98.4 °F (36.9 °C)] 97.5 °F (36.4 °C)  Heart Rate:  [51-79] 51  Resp:  [18] 18  BP: (108-122)/(49-65) 122/58  Physical Exam     Constitutional: Awake, alert, no acute distress, pleasant mood              Eyes: Pupils equal, sclerae anicteric, no conjunctival injection              HENT: NCAT, mucous membranes moist              Neck: Supple, full range of motion              Respiratory: Clear to auscultation bilaterally, nonlabored respirations               Cardiovascular: Irregular rate, irregular rhythm, no murmurs              Gastrointestinal: Positive bowel sounds, soft, nontender, nondistended              Musculoskeletal: No bilateral ankle edema, no clubbing or cyanosis to extremities              Psychiatric:  Appropriate affect, cooperative              Neurologic: Oriented x 3, strength symmetric in all extremities, Cranial Nerves grossly intact to confrontation, speech clear              Skin: No rashes          Result Review    Result Review:  I have personally reviewed the results from the time of this admission to 1/6/2022 13:07 EST and agree with these findings:  [x]  Laboratory   CBC    CBC 1/3/22 1/4/22 1/6/22   WBC 6.42 6.67 6.68   RBC 3.62 (A) 3.66 (A) 3.46 (A)   Hemoglobin 11.8 (A) 12.0 11.6 (A)   Hematocrit 33.5 (A) 33.7 (A) 32.5 (A)   MCV 92.5 92.1 93.9   MCH 32.6 32.8 33.5 (A)   MCHC 35.2 35.6 35.7   RDW 12.2 (A) 12.3 12.3   Platelets 172 177 170   (A) Abnormal value            BMP    BMP 1/3/22 1/4/22 1/6/22   BUN 34 (A) 33 (A) 32 (A)   Creatinine 1.49 (A) 1.88 (A) 1.66 (A)   Sodium 138 139 141   Potassium 4.4 3.3 (A) 3.6   Chloride 101 100 106   CO2 26.2 26.7 24.6   Calcium 9.9 9.6 9.1   (A) Abnormal value       Comments are available for some flowsheets but are not being displayed.           LIVER FUNCTION TESTS:      Lab 01/04/22  1408 01/02/22  2320   TOTAL PROTEIN 7.2 7.1   ALBUMIN 4.70 4.70   GLOBULIN  --  2.4   ALT (SGPT) 13 13   AST (SGOT) 22 22   BILIRUBIN 1.5* 0.8   INDIRECT BILIRUBIN 1.2  --    BILIRUBIN DIRECT 0.3  --    ALK PHOS 117 124*   LIPASE  --  59       [x]  Microbiology No results found for: ACANTHNAEG, AFBCX, BPERTUSSISCX, BLOODCX  No results found for: BCIDPCR, CXREFLEX, CSFCX, CULTURETIS  No results found for: CULTURES, HSVCX, URCX  No results found for: EYECULTURE, GCCX, HSVCULTURE, LABHSV  No results found for: LEGIONELLA, MRSACX, MUMPSCX, MYCOPLASCX  No results found for: NOCARDIACX, STOOLCX  No results found for: THROATCX, UNSTIMCULT, URINECX, CULTURE, VZVCULTUR  No results found for: VIRALCULTU, WOUNDCX    [x]  Radiology Adult Transthoracic Echo Complete W/ Cont if Necessary Per Protocol    Result Date: 1/5/2022  · Calculated left ventricular EF = 53.6% Estimated left  ventricular EF was in agreement with the calculated left ventricular EF. Left ventricular systolic function is normal. · Moderate mitral valve regurgitation is present. · The aortic valve exhibits sclerosis. Mild aortic valve regurgitation is present. · The left atrial cavity is mild to moderately dilated      XR Chest 1 View    Result Date: 1/2/2022  PROCEDURE: XR CHEST 1 VW  COMPARISON: 6/11/2018.  INDICATIONS: CHEST PAIN.  FINDINGS:  A single AP upright portable chest radiograph was performed.  Borderline cardiac enlargement is seen.  No acute infiltrate is appreciated.  No pleural effusion or pneumothorax is identified.  External artifacts obscure detail. The thoracic aorta is atherosclerotic.  Chronic calcified granulomatous disease involves the chest.  The patient has undergone cholecystectomy.  No significant interval change is seen since the prior study.  CONCLUSION:  No acute infiltrate is appreciated.      STEFFANIE PAULA JR, MD       Electronically Signed and Approved By: STEFFANIE PAULA JR, MD on 1/02/2022 at 23:50             Stress Test With Myocardial Perfusion One Day    Result Date: 1/4/2022  · Left ventricular ejection fraction is normal. (Calculated EF = 57%). · Myocardial perfusion imaging indicates a medium-sized, moderately severe area of ischemia located in the lateral wall and basal inferior lateral wall. · Impressions are consistent with an intermediate risk study. · Findings consistent with a normal ECG stress test.        [x]  EKG/Telemetry   [x]  Cardiology/Vascular   []  Pathology  [x]  Old records  []  Other:    Assessment/Plan   Assessment / Plan     Assessment/Plan:  Assessment:  Chest pain concern for unstable angina  Abnormal stress test  Chronic atrial fibrillation  Long-term anticoagulation with Eliquis  Essential hypertension  CKD stage IIIb  Chronic anemia  GERD without esophagitis  Dyslipidemia     Plan:  Labs and imaging reviewed  Preemptively hydrate her via IV fluids  LR at 50  cc/h for 10 hours total as she is going to have cardiac cath procedure tomorrow  Dr. Mcguire from cardiology consulted, recommendations appreciated, discussed with him  Cardiac cath on 1/7/2021  Hold Eliquis until after cardiac cath procedure  Telemetry continued  Reconciled home medications resumed accordingly  Continue Lipitor 20 mg daily  Continue atenolol 50 mg every 24 hours  Continue Protonix 40 mg daily  A.m. labs  Full code  VTE prophylaxis SCDs  Clinical course to dictate further management  Discussed with nurse at the bedside    DVT prophylaxis:  Mechanical DVT prophylaxis orders are present.    CODE STATUS:   Level Of Support Discussed With: Patient  Code Status (Patient has no pulse and is not breathing): CPR (Attempt to Resuscitate)  Medical Interventions (Patient has pulse or is breathing): Full Support        Electronically signed by Salty Freedman MD, 01/06/22, 1:07 PM EST.

## 2022-01-06 NOTE — PROGRESS NOTES
CARDIOLOGY  INPATIENT PROGRESS NOTE                [facility] UNIT 2    1/6/2022      PATIENT IDENTIFICATION:   Name:  Rupal Buchanan      MRN:  [id number]     81 y.o.  female                 SUBJECTIVE:   No chest pain or shortness of breath  Telemetry no arrhythmias    OBJECTIVE:  Vitals:    01/06/22 0600 01/06/22 0732 01/06/22 0745 01/06/22 1046   BP:   108/58 122/58   BP Location:    Right arm   Patient Position:   Lying Lying   Pulse:   59 51   Resp:   18 18   Temp:   97.7 °F (36.5 °C) 97.5 °F (36.4 °C)   TempSrc:   Oral Oral   SpO2:  99% 97% 98%   Weight: 68.8 kg (151 lb 10.8 oz)      Height:               Body mass index is 25.24 kg/m².    Intake/Output Summary (Last 24 hours) at 1/6/2022 1156  Last data filed at 1/6/2022 0907  Gross per 24 hour   Intake 360 ml   Output --   Net 360 ml       Telemetry:     Physical Exam  General Appearance:   · no acute distress  · Alert and oriented x3  HENT:   · lips not cyanotic  · Atraumatic  Neck:  · No jvd   · supple  Respiratory:  · no respiratory distress  · normal breath sounds  · no rales  Cardiovascular:    · regular rhythm  · no S3, no S4   · no murmur  · no rub  · lower extremity edema: none    Skin:   · warm, dry  · No rashes      Allergies   Allergen Reactions   • Nitrofurantoin GI Intolerance   • Sulfa Antibiotics Itching   • Latex Rash       Scheduled meds:  ALPRAZolam, 1 mg, Oral, Q12H  aspirin, 324 mg, Oral, Once  atenolol, 50 mg, Oral, Q24H  atorvastatin, 20 mg, Oral, Nightly  pantoprazole, 40 mg, Oral, Q AM  rOPINIRole, 1 mg, Oral, Nightly  senna-docusate sodium, 2 tablet, Oral, BID  sodium chloride, 10 mL, Intravenous, Q12H      IV meds:                           Data Review:  CBC    CBC 1/2/22 1/3/22 1/4/22   WBC 5.77 6.42 6.67   RBC 3.74 (A) 3.62 (A) 3.66 (A)   Hemoglobin 12.3 11.8 (A) 12.0   Hematocrit 34.4 33.5 (A) 33.7 (A)   MCV 92.0 92.5 92.1   MCH 32.9 32.6 32.8   MCHC 35.8 (A) 35.2 35.6   RDW 12.3 12.2 (A) 12.3    Platelets 184 172 177   (A) Abnormal value            CMP    CMP 1/3/22 1/4/22 1/4/22 1/6/22     1408 1408    Glucose 105 (A)  80 105 (A)   BUN 34 (A)  33 (A) 32 (A)   Creatinine 1.49 (A)  1.88 (A) 1.66 (A)   eGFR Non African Am 34 (A)  26 (A) 30 (A)   Sodium 138  139 141   Potassium 4.4  3.3 (A) 3.6   Chloride 101  100 106   Calcium 9.9  9.6 9.1   Albumin  4.70     Total Bilirubin  1.5 (A)     Alkaline Phosphatase  117     AST (SGOT)  22     ALT (SGPT)  13     (A) Abnormal value       Comments are available for some flowsheets but are not being displayed.            CARDIAC LABS:      Lab 01/03/22  0415 01/02/22  2320   PROBNP  --  3,915.0*   TROPONIN T <0.010 <0.010   PROTIME 10.7  --    INR 1.02*  --         No results found for: DIGOXIN   No results found for: TSH  Results from last 7 days   Lab Units 01/03/22  0415   CHOLESTEROL mg/dL 135   HDL CHOL mg/dL 63*     Lab Results   Component Value Date    POCTROP 0.01 01/03/2022     Lab Results   Component Value Date    TROPONINT <0.010 01/03/2022   (  Lab Results   Component Value Date    MG 1.8 01/06/2022     Results for orders placed during the hospital encounter of 01/02/22    Adult Transthoracic Echo Complete W/ Cont if Necessary Per Protocol    Interpretation Summary  · Calculated left ventricular EF = 53.6% Estimated left ventricular EF was in agreement with the calculated left ventricular EF. Left ventricular systolic function is normal.  · Moderate mitral valve regurgitation is present.  · The aortic valve exhibits sclerosis. Mild aortic valve regurgitation is present.  · The left atrial cavity is mild to moderately dilated           ASSESSMENT:  Chest pain , no evidence of acute myocardial infarction  Chronic atrial fibrillation, rate controlled, anticoagulated on Eliquis  Essential hypertension  CKD stage IIIb  Positive stress nuclear scan  Hypokalemia    PLAN:   For cardiac cath tomorrow            Hitesh Mcguire MD  1/6/2022    11:56  EST

## 2022-01-07 VITALS
HEIGHT: 65 IN | OXYGEN SATURATION: 100 % | TEMPERATURE: 97.8 F | DIASTOLIC BLOOD PRESSURE: 68 MMHG | BODY MASS INDEX: 25.27 KG/M2 | HEART RATE: 66 BPM | WEIGHT: 151.68 LBS | SYSTOLIC BLOOD PRESSURE: 141 MMHG | RESPIRATION RATE: 18 BRPM

## 2022-01-07 LAB
ALBUMIN SERPL-MCNC: 3.8 G/DL (ref 3.5–5.2)
ALP SERPL-CCNC: 114 U/L (ref 39–117)
ALT SERPL W P-5'-P-CCNC: 9 U/L (ref 1–33)
ANION GAP SERPL CALCULATED.3IONS-SCNC: 8 MMOL/L (ref 5–15)
AST SERPL-CCNC: 14 U/L (ref 1–32)
BASOPHILS # BLD AUTO: 0.04 10*3/MM3 (ref 0–0.2)
BASOPHILS NFR BLD AUTO: 0.6 % (ref 0–1.5)
BILIRUB CONJ SERPL-MCNC: 0.2 MG/DL (ref 0–0.3)
BILIRUB INDIRECT SERPL-MCNC: 0.3 MG/DL
BILIRUB SERPL-MCNC: 0.5 MG/DL (ref 0–1.2)
BUN SERPL-MCNC: 33 MG/DL (ref 8–23)
BUN/CREAT SERPL: 21.6 (ref 7–25)
CALCIUM SPEC-SCNC: 9.1 MG/DL (ref 8.6–10.5)
CHLORIDE SERPL-SCNC: 109 MMOL/L (ref 98–107)
CO2 SERPL-SCNC: 25 MMOL/L (ref 22–29)
CREAT SERPL-MCNC: 1.53 MG/DL (ref 0.57–1)
DEPRECATED RDW RBC AUTO: 41.9 FL (ref 37–54)
EOSINOPHIL # BLD AUTO: 0.33 10*3/MM3 (ref 0–0.4)
EOSINOPHIL NFR BLD AUTO: 5.2 % (ref 0.3–6.2)
ERYTHROCYTE [DISTWIDTH] IN BLOOD BY AUTOMATED COUNT: 12.2 % (ref 12.3–15.4)
GFR SERPL CREATININE-BSD FRML MDRD: 32 ML/MIN/1.73
GLUCOSE SERPL-MCNC: 102 MG/DL (ref 65–99)
HCT VFR BLD AUTO: 30.7 % (ref 34–46.6)
HGB BLD-MCNC: 10.9 G/DL (ref 12–15.9)
IMM GRANULOCYTES # BLD AUTO: 0.01 10*3/MM3 (ref 0–0.05)
IMM GRANULOCYTES NFR BLD AUTO: 0.2 % (ref 0–0.5)
LYMPHOCYTES # BLD AUTO: 2.18 10*3/MM3 (ref 0.7–3.1)
LYMPHOCYTES NFR BLD AUTO: 34.7 % (ref 19.6–45.3)
MAGNESIUM SERPL-MCNC: 1.6 MG/DL (ref 1.6–2.4)
MCH RBC QN AUTO: 33 PG (ref 26.6–33)
MCHC RBC AUTO-ENTMCNC: 35.5 G/DL (ref 31.5–35.7)
MCV RBC AUTO: 93 FL (ref 79–97)
MONOCYTES # BLD AUTO: 0.65 10*3/MM3 (ref 0.1–0.9)
MONOCYTES NFR BLD AUTO: 10.3 % (ref 5–12)
NEUTROPHILS NFR BLD AUTO: 3.08 10*3/MM3 (ref 1.7–7)
NEUTROPHILS NFR BLD AUTO: 49 % (ref 42.7–76)
NRBC BLD AUTO-RTO: 0 /100 WBC (ref 0–0.2)
PHOSPHATE SERPL-MCNC: 3.3 MG/DL (ref 2.5–4.5)
PLATELET # BLD AUTO: 153 10*3/MM3 (ref 140–450)
PMV BLD AUTO: 10 FL (ref 6–12)
POTASSIUM SERPL-SCNC: 3.8 MMOL/L (ref 3.5–5.2)
PROT SERPL-MCNC: 6.1 G/DL (ref 6–8.5)
RBC # BLD AUTO: 3.3 10*6/MM3 (ref 3.77–5.28)
SODIUM SERPL-SCNC: 142 MMOL/L (ref 136–145)
WBC NRBC COR # BLD: 6.29 10*3/MM3 (ref 3.4–10.8)

## 2022-01-07 PROCEDURE — 80076 HEPATIC FUNCTION PANEL: CPT | Performed by: FAMILY MEDICINE

## 2022-01-07 PROCEDURE — 93458 L HRT ARTERY/VENTRICLE ANGIO: CPT | Performed by: INTERNAL MEDICINE

## 2022-01-07 PROCEDURE — 83735 ASSAY OF MAGNESIUM: CPT | Performed by: FAMILY MEDICINE

## 2022-01-07 PROCEDURE — 99153 MOD SED SAME PHYS/QHP EA: CPT | Performed by: INTERNAL MEDICINE

## 2022-01-07 PROCEDURE — 4A023N7 MEASUREMENT OF CARDIAC SAMPLING AND PRESSURE, LEFT HEART, PERCUTANEOUS APPROACH: ICD-10-PCS | Performed by: INTERNAL MEDICINE

## 2022-01-07 PROCEDURE — 85025 COMPLETE CBC W/AUTO DIFF WBC: CPT | Performed by: FAMILY MEDICINE

## 2022-01-07 PROCEDURE — 99152 MOD SED SAME PHYS/QHP 5/>YRS: CPT | Performed by: INTERNAL MEDICINE

## 2022-01-07 PROCEDURE — B2151ZZ FLUOROSCOPY OF LEFT HEART USING LOW OSMOLAR CONTRAST: ICD-10-PCS | Performed by: INTERNAL MEDICINE

## 2022-01-07 PROCEDURE — 99239 HOSP IP/OBS DSCHRG MGMT >30: CPT | Performed by: FAMILY MEDICINE

## 2022-01-07 PROCEDURE — 94799 UNLISTED PULMONARY SVC/PX: CPT

## 2022-01-07 PROCEDURE — 25010000002 MIDAZOLAM PER 1 MG

## 2022-01-07 PROCEDURE — 0 IOPAMIDOL PER 1 ML: Performed by: INTERNAL MEDICINE

## 2022-01-07 PROCEDURE — B2111ZZ FLUOROSCOPY OF MULTIPLE CORONARY ARTERIES USING LOW OSMOLAR CONTRAST: ICD-10-PCS | Performed by: INTERNAL MEDICINE

## 2022-01-07 PROCEDURE — 84100 ASSAY OF PHOSPHORUS: CPT | Performed by: FAMILY MEDICINE

## 2022-01-07 PROCEDURE — C1894 INTRO/SHEATH, NON-LASER: HCPCS | Performed by: INTERNAL MEDICINE

## 2022-01-07 PROCEDURE — 80048 BASIC METABOLIC PNL TOTAL CA: CPT | Performed by: FAMILY MEDICINE

## 2022-01-07 PROCEDURE — 25010000002 FENTANYL CITRATE (PF) 50 MCG/ML SOLUTION: Performed by: INTERNAL MEDICINE

## 2022-01-07 RX ORDER — MIDAZOLAM HYDROCHLORIDE 2 MG/2ML
INJECTION, SOLUTION INTRAMUSCULAR; INTRAVENOUS AS NEEDED
Status: DISCONTINUED | OUTPATIENT
Start: 2022-01-07 | End: 2022-01-07 | Stop reason: HOSPADM

## 2022-01-07 RX ORDER — ACETAMINOPHEN 325 MG/1
650 TABLET ORAL EVERY 4 HOURS PRN
Status: DISCONTINUED | OUTPATIENT
Start: 2022-01-07 | End: 2022-01-07 | Stop reason: HOSPADM

## 2022-01-07 RX ORDER — SODIUM CHLORIDE, SODIUM LACTATE, POTASSIUM CHLORIDE, CALCIUM CHLORIDE 600; 310; 30; 20 MG/100ML; MG/100ML; MG/100ML; MG/100ML
75 INJECTION, SOLUTION INTRAVENOUS CONTINUOUS
Status: DISCONTINUED | OUTPATIENT
Start: 2022-01-07 | End: 2022-01-07 | Stop reason: HOSPADM

## 2022-01-07 RX ORDER — FENTANYL CITRATE 50 UG/ML
INJECTION, SOLUTION INTRAMUSCULAR; INTRAVENOUS AS NEEDED
Status: DISCONTINUED | OUTPATIENT
Start: 2022-01-07 | End: 2022-01-07 | Stop reason: HOSPADM

## 2022-01-07 RX ORDER — SODIUM CHLORIDE 450 MG/100ML
75 INJECTION, SOLUTION INTRAVENOUS CONTINUOUS
Status: ACTIVE | OUTPATIENT
Start: 2022-01-07 | End: 2022-01-07

## 2022-01-07 RX ORDER — LIDOCAINE HYDROCHLORIDE 20 MG/ML
INJECTION, SOLUTION INFILTRATION; PERINEURAL AS NEEDED
Status: DISCONTINUED | OUTPATIENT
Start: 2022-01-07 | End: 2022-01-07 | Stop reason: HOSPADM

## 2022-01-07 RX ADMIN — SODIUM CHLORIDE, POTASSIUM CHLORIDE, SODIUM LACTATE AND CALCIUM CHLORIDE 75 ML/HR: 600; 310; 30; 20 INJECTION, SOLUTION INTRAVENOUS at 08:12

## 2022-01-07 NOTE — SIGNIFICANT NOTE
01/06/22 1235   Coping/Psychosocial   Observed Emotional State calm; cooperative   Verbalized Emotional State hopefulness   Trust Relationship/Rapport empathic listening provided   Family/Support Persons spouse   Involvement in Care no interaction with patient   Additional Documentation Spiritual Care (Group)   Spiritual Care   Use of Spiritual Resources non-Religion use of spiritual care   Spiritual Care Source  initiative   Spiritual Care Follow-Up follow-up, none required as presently assessed   Response to Spiritual Care receptive of support   Spiritual Care Interventions supportive conversation provided   Spiritual Care Visit Type initial   Receptivity to Spiritual Care visit welcomed

## 2022-01-07 NOTE — DISCHARGE SUMMARY
[facility]         HOSPITALIST  DISCHARGE SUMMARY    Patient Name: Rupal Buchanan  : 1940  MRN: [id number]    Date of Admission: 2022  Date of Discharge:  2022    Primary Care Physician: Morris Tanner MD    Consults     Date and Time Order Name Status Description    1/3/2022  7:16 AM Inpatient Cardiology Consult            Active and Resolved Hospital Problems:   Chest pain concern for unstable angina  Abnormal stress test  Chronic atrial fibrillation  Long-term anticoagulation with Eliquis  Essential hypertension  CKD stage IIIb  Chronic anemia  GERD without esophagitis  Dyslipidemia  Active Hospital Problems    Diagnosis POA   • Unstable angina pectoris due to coronary arteriosclerosis (HCC) [I25.110] Yes   • Chest pain [R07.9] Yes      Resolved Hospital Problems   No resolved problems to display.       Hospital Course     Hospital Course:  81-year-old female with history of chronic atrial fibrillation, essential hypertension, CKD stage IIIb, long-term anticoagulation Eliquis, was hospitalized on 2021 with symptoms of chest pain, troponins were trended, EKG trend ordered, cardiology consulted.  Patient with features of angina, plans to pursue stress test.  Cardiology consulted.  Abnormal stress test, plans for cardiac cath, Eliquis held for 3 days, cardiac cath procedure performed on 2022, no significant coronary disease, cleared by cardiology for disposition thereafter to resume Eliquis on 2022 and follow-up with cardiology within 1 week. Patient discharged in hemodynamically stable condition on 2022.    Day of Discharge     Vital Signs:  Temp:  [97.2 °F (36.2 °C)-98.1 °F (36.7 °C)] 97.5 °F (36.4 °C)  Heart Rate:  [58-81] 69  Resp:  [16-18] 18  BP: (104-141)/(45-77) 137/77  Flow (L/min):  [2] 2    Review of systems:  All systems reviewed and negative except for generalized fatigue.    Physical Exam                          Constitutional: Awake,  alert, no acute distress, pleasant mood              Eyes: Pupils equal, sclerae anicteric, no conjunctival injection              HENT: NCAT, mucous membranes moist              Neck: Supple, full range of motion              Respiratory: Clear to auscultation bilaterally, nonlabored respirations               Cardiovascular: Irregular rate, irregular rhythm, no murmurs              Gastrointestinal: Positive bowel sounds, soft, nontender, nondistended              Musculoskeletal: No bilateral ankle edema, no clubbing or cyanosis to extremities              Psychiatric: Appropriate affect, cooperative              Neurologic: Oriented x 3, strength symmetric in all extremities, Cranial Nerves grossly intact to confrontation, speech clear              Skin: No rashes        Discharge Details        Discharge Medications      Changes to Medications      Instructions Start Date   apixaban 2.5 MG tablet tablet  Commonly known as: Eliquis  What changed: These instructions start on January 9, 2022. If you are unsure what to do until then, ask your doctor or other care provider.   2.5 mg, Oral, Every 12 Hours Scheduled   Start Date: January 9, 2022        Continue These Medications      Instructions Start Date   ALPRAZolam 1 MG tablet  Commonly known as: XANAX   1 mg, Oral, Every 12 Hours Scheduled      atenolol 50 MG tablet  Commonly known as: TENORMIN   50 mg, Oral, Daily      hydroCHLOROthiazide 25 MG tablet  Commonly known as: HYDRODIURIL   25 mg, Oral, Daily      HYDROcodone-acetaminophen 7.5-325 MG per tablet  Commonly known as: NORCO   1 tablet, Oral, Every 6 Hours PRN      pantoprazole 40 MG EC tablet  Commonly known as: PROTONIX   40 mg, Oral, Daily      rOPINIRole 1 MG tablet  Commonly known as: REQUIP   1 mg, Oral, Nightly      Zocor 40 MG tablet  Generic drug: simvastatin   40 mg, Oral, Nightly             Allergies   Allergen Reactions   • Nitrofurantoin GI Intolerance   • Sulfa Antibiotics Itching   • Latex  Rash       Discharge Disposition:  Home or Self Care    Diet:  Hospital:  Diet Order   Procedures   • Diet Regular; Cardiac       Discharge Activity:  As tolerates      CODE STATUS:  Code Status and Medical Interventions:   Ordered at: 01/03/22 0716     Level Of Support Discussed With:    Patient     Code Status (Patient has no pulse and is not breathing):    CPR (Attempt to Resuscitate)     Medical Interventions (Patient has pulse or is breathing):    Full Support         Future Appointments   Date Time Provider Department Center   1/12/2022  1:30 PM Luigi De La Cruz MD [facility]       Additional Instructions for the Follow-ups that You Need to Schedule     Discharge Follow-up with PCP   As directed       Currently Documented PCP:    Morris Tanner MD    PCP Phone Number:    [phone]     Follow Up Details: 3 to 7 days         Discharge Follow-up with Specified Provider: Dr Mcguire in 1 week   As directed      To: Dr Mcguire in 1 week               Pertinent  and/or Most Recent Results     PROCEDURES:   Adult Transthoracic Echo Complete W/ Cont if Necessary Per Protocol    Result Date: 1/5/2022  · Calculated left ventricular EF = 53.6% Estimated left ventricular EF was in agreement with the calculated left ventricular EF. Left ventricular systolic function is normal. · Moderate mitral valve regurgitation is present. · The aortic valve exhibits sclerosis. Mild aortic valve regurgitation is present. · The left atrial cavity is mild to moderately dilated      Cardiac Catheterization/Vascular Study    Result Date: 1/7/2022  Left heart cath and Coronary Angiography Report Patient Name: Rupal Buchanan Patient ID: [id number] Date: 1/7/2022 PCP: @PCP@ Surgeon: Hitesh Mcguire MD INDICATION: Coronary disease with angina pectoris.   PROCEDURE PERFORMED: Left heart catheterization, Left ventriculography, Selective coronary angiography and Moderate sedation  DESCRIPTION OF PROCEDURE: I supervised  moderate sedation during the duration of the procedure Under local anesthesia, 4-Singaporean sheath was introduced into the right femoral artery via Seldinger technique.  Thereafter 4-Singaporean pigtail was advanced into the ascending aorta and placed into the left ventricle.  Left ventricular pressure as well as aortic pressures was recorded.  Thereafter left ventriculogram was done in the Cuban 30-degree position using 10 mL per second of contrast agent for three seconds.  After the left ventriculogram the post injection left ventricular pressure was recorded.  Pullback pressure was recorded across from the left ventricle into the aorta across the aortic valve.  Thereafter the pigtail catheter was removed.  A 4-Singaporean JL-4 diagnostic catheter was then advanced with the help of a guidewire into the ascending aorta.  Injection of the left coronary artery was done in the Cuban and KOCH views.  Thereafter JL-4 diagnostic catheter was removed and replaced with a 4-Singaporean JR-4 diagnostic catheter and injections were done into the KOCH and Cuban views.  The JL-4 was removed.   The sheath in the right groin was also removed and hemostasis obtained by manual pressure.  The procedure was done without any complications. Blood loss: None.  RESULTS OF PROCEDURE: A)  LEFT VENTRICULOGRAPHY:  Normal-sized left ventricle with good contractility with left ejection fraction of 60% B)  SELECTIVE CORONARY ANGIOGRAPHY: 1.  LEFT MAIN CORONARY ARTERY Normal 2.  LEFT ANTERIOR DESCENDING CORONARY ARTERY  Small vessel and no significant disease 3.  LEFT CIRCUMFLEX CORONARY ARTERY  Nondominant vessel and no significant disease 4.  RIGHT CORONARY ARTERY  Normal CONCLUSION: Normal-sized left ventricle with good contractility No significant coronary disease Electronically signed by Hitesh Mcguire MD, 01/07/22, 1:31 PM EST.     XR Chest 1 View    Result Date: 1/2/2022  PROCEDURE: XR CHEST 1 VW  COMPARISON: 6/11/2018.  INDICATIONS: CHEST PAIN.   FINDINGS:  A single AP upright portable chest radiograph was performed.  Borderline cardiac enlargement is seen.  No acute infiltrate is appreciated.  No pleural effusion or pneumothorax is identified.  External artifacts obscure detail. The thoracic aorta is atherosclerotic.  Chronic calcified granulomatous disease involves the chest.  The patient has undergone cholecystectomy.  No significant interval change is seen since the prior study.  CONCLUSION:  No acute infiltrate is appreciated.      STEFFANIE PAULA JR, MD       Electronically Signed and Approved By: STEFFANIE PAULA JR, MD on 1/02/2022 at 23:50             Stress Test With Myocardial Perfusion One Day    Result Date: 1/4/2022  · Left ventricular ejection fraction is normal. (Calculated EF = 57%). · Myocardial perfusion imaging indicates a medium-sized, moderately severe area of ischemia located in the lateral wall and basal inferior lateral wall. · Impressions are consistent with an intermediate risk study. · Findings consistent with a normal ECG stress test.        LAB RESULTS:      Lab 01/07/22  0434 01/06/22  1243 01/04/22  0445 01/03/22 0415 01/02/22  2320   WBC 6.29 6.68 6.67 6.42 5.77   HEMOGLOBIN 10.9* 11.6* 12.0 11.8* 12.3   HEMATOCRIT 30.7* 32.5* 33.7* 33.5* 34.4   PLATELETS 153 170 177 172 184   NEUTROS ABS 3.08 3.58 3.12  --  2.49   IMMATURE GRANS (ABS) 0.01 0.01 0.01  --  0.01   LYMPHS ABS 2.18 2.02 2.55  --  2.35   MONOS ABS 0.65 0.78 0.67  --  0.62   EOS ABS 0.33 0.26 0.28  --  0.26   MCV 93.0 93.9 92.1 92.5 92.0   PROTIME  --  10.6  --  10.7  --          Lab 01/07/22  0434 01/06/22  1243 01/06/22  0810 01/04/22  1408 01/04/22  0445 01/03/22  0415 01/02/22  2320 01/02/22  2320   SODIUM 142 139 141 139  --  138   < > 140   POTASSIUM 3.8 4.0 3.6 3.3*  --  4.4   < > 3.6   CHLORIDE 109* 106 106 100  --  101   < > 101   CO2 25.0 24.6 24.6 26.7  --  26.2   < > 27.0   ANION GAP 8.0 8.4 10.4 12.3  --  10.8   < > 12.0   BUN 33* 34* 32* 33*  --  34*    < > 33*   CREATININE 1.53* 1.75* 1.66* 1.88*  --  1.49*   < > 1.67*   GLUCOSE 102* 91 105* 80  --  105*   < > 105*   CALCIUM 9.1 9.1 9.1 9.6  --  9.9   < > 9.8   MAGNESIUM 1.6  --  1.8  --  1.8 1.8  --  1.8   PHOSPHORUS 3.3  --   --   --  4.0  --   --   --    HEMOGLOBIN A1C  --   --   --   --   --  5.50  --   --     < > = values in this interval not displayed.         Lab 01/07/22  0434 01/06/22  1243 01/04/22  1408 01/02/22  2320   TOTAL PROTEIN 6.1 6.3 7.2 7.1   ALBUMIN 3.80 4.10 4.70 4.70   GLOBULIN  --  2.2  --  2.4   ALT (SGPT) 9 9 13 13   AST (SGOT) 14 17 22 22   BILIRUBIN 0.5 0.5 1.5* 0.8   INDIRECT BILIRUBIN 0.3  --  1.2  --    BILIRUBIN DIRECT 0.2  --  0.3  --    ALK PHOS 114 129* 117 124*   LIPASE  --   --   --  59         Lab 01/06/22  1243 01/03/22  0415 01/02/22  2320   PROBNP  --   --  3,915.0*   TROPONIN T  --  <0.010 <0.010   PROTIME 10.6 10.7  --    INR 1.01* 1.02*  --          Lab 01/03/22  0415   CHOLESTEROL 135   LDL CHOL 54   HDL CHOL 63*   TRIGLYCERIDES 97             Brief Urine Lab Results  (Last result in the past 365 days)      Color   Clarity   Blood   Leuk Est   Nitrite   Protein   CREAT   Urine HCG        01/15/21 1514   CLEAR   NEGATIVE   SMALL  Comment: URINE MICROSCOPICS:    Only performed if any of the following are present:    Appearance is Sl Cloudy to Turbid; Protein is    30 to >/= 300 mg/dL; Occult Blood, Nitrite, or Leukocyte    Esterase is positive. Color is Red or Orange.     NEGATIVE                 Microbiology Results (last 10 days)     Procedure Component Value - Date/Time    COVID PRE-OP / PRE-PROCEDURE SCREENING ORDER (NO ISOLATION) - Swab, Nasopharynx [[id number]]  (Normal) Collected: 01/03/22 0912    Lab Status: Final result Specimen: Swab from Nasopharynx Updated: 01/03/22 4578    Narrative:      The following orders were created for panel order COVID PRE-OP / PRE-PROCEDURE SCREENING ORDER (NO ISOLATION) - Swab, Nasopharynx.  Procedure                                Abnormality         Status                     ---------                               -----------         ------                     COVID-19,CEPHEID/CHE,CO...[[id number]]  Normal              Final result                 Please view results for these tests on the individual orders.    COVID-19,CEPHEID/CHE,COR/[place]/PAD/TANA IN-HOUSE(OR EMERGENT/ADD-ON),NP SWAB IN TRANSPORT MEDIA 3-4 HR TAT, RT-PCR - Swab, Nasopharynx [[id number]]  (Normal) Collected: 01/03/22 0912    Lab Status: Final result Specimen: Swab from Nasopharynx Updated: 01/03/22 1855     COVID19 Not Detected    Narrative:      Fact sheet for providers: https://www.fda.gov/media/491941/download     Fact sheet for patients: https://www.fda.gov/media/064632/download                       Results for orders placed during the hospital encounter of 01/02/22    Adult Transthoracic Echo Complete W/ Cont if Necessary Per Protocol    Interpretation Summary  · Calculated left ventricular EF = 53.6% Estimated left ventricular EF was in agreement with the calculated left ventricular EF. Left ventricular systolic function is normal.  · Moderate mitral valve regurgitation is present.  · The aortic valve exhibits sclerosis. Mild aortic valve regurgitation is present.  · The left atrial cavity is mild to moderately dilated      Labs Pending at Discharge: None        Time spent on Discharge including face to face service:  40 minutes    Electronically signed by Salty Freedman MD, 01/07/22, 2:24 PM EST.

## 2022-01-07 NOTE — PROGRESS NOTES
CARDIOLOGY  INPATIENT PROGRESS NOTE                [facility] CATH LAB    1/7/2022      PATIENT IDENTIFICATION:   Name:  Rupal Buchanan      MRN:  [id number]     81 y.o.  female                 SUBJECTIVE:   Patient underwent left heart cath core angiography today.  Good LV systolic function with ejection fraction of 60% and no significant coronary disease.  Her chest pain most likely is noncardiac    OBJECTIVE:  Vitals:    01/07/22 0758 01/07/22 0805 01/07/22 1111 01/07/22 1251   BP: 141/57  137/77    BP Location: Left arm  Left arm    Patient Position: Lying  Lying    Pulse: 58  69    Resp: 18  18    Temp: 97.2 °F (36.2 °C)  97.5 °F (36.4 °C)    TempSrc: Oral  Oral    SpO2: 99% 98% 100% 100%   Weight:       Height:               Body mass index is 25.24 kg/m².    Intake/Output Summary (Last 24 hours) at 1/7/2022 1345  Last data filed at 1/6/2022 1600  Gross per 24 hour   Intake 360 ml   Output --   Net 360 ml       Telemetry:     Physical Exam  General Appearance:   · no acute distress  · Alert and oriented x3  HENT:   · lips not cyanotic  · Atraumatic  Neck:  · No jvd   · supple  Respiratory:  · no respiratory distress  · normal breath sounds  · no rales  Cardiovascular:    · regular rhythm  · no S3, no S4   · no murmur  · no rub  · lower extremity edema: none    Skin:   · warm, dry  · No rashes      Allergies   Allergen Reactions   • Nitrofurantoin GI Intolerance   • Sulfa Antibiotics Itching   • Latex Rash       Scheduled meds:  [MAR Hold] ALPRAZolam, 1 mg, Oral, Q12H  [MAR Hold] aspirin, 324 mg, Oral, Once  atenolol, 50 mg, Oral, Q24H  [MAR Hold] atorvastatin, 20 mg, Oral, Nightly  [MAR Hold] pantoprazole, 40 mg, Oral, Q AM  [MAR Hold] rOPINIRole, 1 mg, Oral, Nightly  [MAR Hold] senna-docusate sodium, 2 tablet, Oral, BID  [MAR Hold] sodium chloride, 10 mL, Intravenous, Q12H      IV meds:                      lactated ringers, 75 mL/hr, Last Rate: 75 mL/hr (01/07/22 0812)  sodium chloride, 75  mL/hr        Data Review:  CBC    CBC 1/4/22 1/6/22 1/7/22   WBC 6.67 6.68 6.29   RBC 3.66 (A) 3.46 (A) 3.30 (A)   Hemoglobin 12.0 11.6 (A) 10.9 (A)   Hematocrit 33.7 (A) 32.5 (A) 30.7 (A)   MCV 92.1 93.9 93.0   MCH 32.8 33.5 (A) 33.0   MCHC 35.6 35.7 35.5   RDW 12.3 12.3 12.2 (A)   Platelets 177 170 153   (A) Abnormal value            CMP    CMP 1/4/22 1/4/22 1/6/22 1/6/22 1/7/22 1/7/22    1408 1408 0810 1243 0434 0434   Glucose  80 105 (A) 91  102 (A)   BUN  33 (A) 32 (A) 34 (A)  33 (A)   Creatinine  1.88 (A) 1.66 (A) 1.75 (A)  1.53 (A)   eGFR Non African Am  26 (A) 30 (A) 28 (A)  32 (A)   Sodium  139 141 139  142   Potassium  3.3 (A) 3.6 4.0  3.8   Chloride  100 106 106  109 (A)   Calcium  9.6 9.1 9.1  9.1   Albumin 4.70   4.10 3.80    Total Bilirubin 1.5 (A)   0.5 0.5    Alkaline Phosphatase 117   129 (A) 114    AST (SGOT) 22   17 14    ALT (SGPT) 13   9 9    (A) Abnormal value             CARDIAC LABS:      Lab 01/06/22  1243 01/03/22  0415 01/02/22  2320   PROBNP  --   --  3,915.0*   TROPONIN T  --  <0.010 <0.010   PROTIME 10.6 10.7  --    INR 1.01* 1.02*  --         No results found for: DIGOXIN   No results found for: TSH  Results from last 7 days   Lab Units 01/03/22  0415   CHOLESTEROL mg/dL 135   HDL CHOL mg/dL 63*     Lab Results   Component Value Date    POCTROP 0.01 01/03/2022     Lab Results   Component Value Date    TROPONINT <0.010 01/03/2022   (  Lab Results   Component Value Date    MG 1.6 01/07/2022     Results for orders placed during the hospital encounter of 01/02/22    Adult Transthoracic Echo Complete W/ Cont if Necessary Per Protocol    Interpretation Summary  · Calculated left ventricular EF = 53.6% Estimated left ventricular EF was in agreement with the calculated left ventricular EF. Left ventricular systolic function is normal.  · Moderate mitral valve regurgitation is present.  · The aortic valve exhibits sclerosis. Mild aortic valve regurgitation is present.  · The left atrial cavity  is mild to moderately dilated           ASSESSMENT:  Chest pain , no evidence of acute myocardial infarction  Chronic atrial fibrillation, rate controlled, anticoagulated on Eliquis  Essential hypertension  CKD stage IIIb  Positive stress nuclear scan  No significant coronary disease      PLAN:     Okay to send home  Restart Eliquis 2 days later  Follow-up in office in 1 week          Hitesh Mcguire MD  1/7/2022    13:45 EST

## 2022-01-07 NOTE — PLAN OF CARE
Problem: Adult Inpatient Plan of Care  Goal: Optimal Comfort and Wellbeing  Intervention: Provide Person-Centered Care  Recent Flowsheet Documentation  Taken 1/7/2022 0200 by Deana Granados, RN  Trust Relationship/Rapport:   care explained   choices provided   questions answered   questions encouraged     Problem: Pain Chronic (Persistent) (Comorbidity Management)  Goal: Acceptable Pain Control and Functional Ability  Intervention: Optimize Psychosocial Wellbeing  Recent Flowsheet Documentation  Taken 1/7/2022 0200 by Deana Granados, RN  Family/Support System Care:   self-care encouraged   support provided   Goal Outcome Evaluation:   VSS. NPO at midnight, plan for cardiac cath today. No complaints overnight.

## 2022-01-08 ENCOUNTER — READMISSION MANAGEMENT (OUTPATIENT)
Dept: CALL CENTER | Facility: HOSPITAL | Age: 82
End: 2022-01-08

## 2022-01-08 NOTE — OUTREACH NOTE
Prep Survey      Responses   [facility] facility patient discharged from? [facility]   Is LACE score < 7 ? No   Emergency Room discharge w/ pulse ox? No   Eligibility Readm Mgmt   Discharge diagnosis chest pain, s/p heart cath   Does the patient have one of the following disease processes/diagnoses(primary or secondary)? Other   Does the patient have Home health ordered? No   Is there a DME ordered? No   Prep survey completed? Yes          Loretta Carney RN

## 2022-01-12 ENCOUNTER — READMISSION MANAGEMENT (OUTPATIENT)
Dept: CALL CENTER | Facility: HOSPITAL | Age: 82
End: 2022-01-12

## 2022-01-12 NOTE — OUTREACH NOTE
Medical Week 1 Survey      Responses   [facility] patient discharged from? [facility]   Does the patient have one of the following disease processes/diagnoses(primary or secondary)? Other   Week 1 attempt successful? Yes   Call start time 1109   Call end time 1112   Discharge diagnosis chest pain, s/p heart cath   Meds reviewed with patient/caregiver? Yes   Is the patient having any side effects they believe may be caused by any medication additions or changes? No   Does the patient have all medications ordered at discharge? Yes   Is the patient taking all medications as directed (includes completed medication regime)? Yes   Medication comments Patient has restarted Eliquis   Does the patient have a primary care provider?  Yes   Does the patient have an appointment with their PCP within 7 days of discharge? No   What is preventing the patient from scheduling follow up appointments within 7 days of discharge? Haven't had time   Nursing Interventions Advised patient to make appointment   Has the patient kept scheduled appointments due by today? N/A   Comments Has appt with Dr Mcguire on 1/17   Psychosocial issues? No   Did the patient receive a copy of their discharge instructions? Yes   Nursing interventions Reviewed instructions with patient   What is the patient's perception of their health status since discharge? Improving   Is the patient/caregiver able to teach back signs and symptoms related to disease process for when to call PCP? Yes   Is the patient/caregiver able to teach back signs and symptoms related to disease process for when to call 911? Yes   Is the patient/caregiver able to teach back the hierarchy of who to call/visit for symptoms/problems? PCP, Specialist, Home health nurse, Urgent Care, ED, 911 Yes   Additional teach back comments States she is doing better and denies further chest pains.   Week 1 call completed? Yes   Graduated Yes   Graduated/Revoked comments Denies questions or needs at  this time          Crissy Varma LPN

## 2022-02-16 ENCOUNTER — OFFICE VISIT (OUTPATIENT)
Dept: ORTHOPEDIC SURGERY | Facility: CLINIC | Age: 82
End: 2022-02-16

## 2022-02-16 VITALS — BODY MASS INDEX: 24.99 KG/M2 | HEART RATE: 65 BPM | HEIGHT: 65 IN | WEIGHT: 150 LBS | OXYGEN SATURATION: 99 %

## 2022-02-16 DIAGNOSIS — M11.20 CHONDROCALCINOSIS: ICD-10-CM

## 2022-02-16 DIAGNOSIS — M25.561 RIGHT KNEE PAIN, UNSPECIFIED CHRONICITY: ICD-10-CM

## 2022-02-16 DIAGNOSIS — M17.11 PRIMARY OSTEOARTHRITIS OF RIGHT KNEE: Primary | ICD-10-CM

## 2022-02-16 PROCEDURE — 99203 OFFICE O/P NEW LOW 30 MIN: CPT | Performed by: STUDENT IN AN ORGANIZED HEALTH CARE EDUCATION/TRAINING PROGRAM

## 2022-02-16 PROCEDURE — 20610 DRAIN/INJ JOINT/BURSA W/O US: CPT | Performed by: STUDENT IN AN ORGANIZED HEALTH CARE EDUCATION/TRAINING PROGRAM

## 2022-02-16 NOTE — PROGRESS NOTES
"Chief Complaint  Pain of the Right Knee    Subjective          Rupal Buchanan presents to [facility] for   History of Present Illness    The patient presents here today for evaluation of the right knee. The patient has had right knee pain for over 1 year. She locates the pain to the anterior knee. She admits to swelling in the knee. She has no injury or trauma. She has not had any previous surgery. She has previously had an injection without relief. She takes eliquis so can not take NSAIDS. She has instability to the knee. She has pinched nerve in her back. She has no other complaints today.     Allergies   Allergen Reactions   • Nitrofurantoin GI Intolerance   • Sulfa Antibiotics Itching   • Latex Rash        Social History     Socioeconomic History   • Marital status:    Tobacco Use   • Smoking status: Never Smoker   • Smokeless tobacco: Former User     Types: Chew   Vaping Use   • Vaping Use: Never used   Substance and Sexual Activity   • Alcohol use: Never   • Drug use: Never        I reviewed the patient's chief complaint, history of present illness, review of systems, past medical history, surgical history, family history, social history, medications, and allergy list.     REVIEW OF SYSTEMS    Constitutional: Denies fevers, chills, weight loss  Cardiovascular: Denies chest pain, shortness of breath  Skin: Denies rashes, acute skin changes  Neurologic: Denies headache, loss of consciousness  MSK: right knee pain      Objective   Vital Signs:   Pulse 65   Ht 165.1 cm (65\")   Wt 68 kg (150 lb)   SpO2 99%   BMI 24.96 kg/m²     Body mass index is 24.96 kg/m².    Physical Exam    General: Alert. No acute distress.   Right knee- ROM -5 to 120 degrees. Moderate effusion. Stable to varus/valgus stress. Stable to anterior/posterior drawer. Intact Extensor Mechanism. Medial joint line tenderness. Lateral joint line tenderness.  Peripatellar tenderness.  Crepitus with knee motion. "  Lateral pain with gentle hip ROM. Positive EHL, FHL, GS and TA.Positive pulses. Paresthesia in foot.     Left knee ROM 0-130 degrees. Stable to varus/valgus stress. Stable to anterior/posterior drawer. Intact Extensor Mechanism. Calf soft. Positive EHL, FHL, GS and TA. Sensation intact to all 5 nerves of the foot. Positive pulses.     Large Joint Arthrocentesis: R knee  Date/Time: 2/16/2022 10:16 AM  Consent given by: patient  Site marked: site marked  Timeout: Immediately prior to procedure a time out was called to verify the correct patient, procedure, equipment, support staff and site/side marked as required   Supporting Documentation  Indications: pain   Procedure Details  Location: knee - R knee  Needle gauge: 21 G.  Medications administered: 48 mg hylan 48 MG/6ML  Patient tolerance: patient tolerated the procedure well with no immediate complications          Imaging Results (Most Recent)     Procedure Component Value Units Date/Time    XR Knee 4+ View Right [[id number]] Resulted: 02/16/22 1109     Updated: 02/16/22 1110    Narrative:      Indications: Right knee pain    Views: Weightbearing AP, PA flexion, lateral, sunrise right knee    Findings: Advanced patellofemoral arthritic changes are seen.  Moderate   medial and lateral compartment arthritis with chondrocalcinosis noted.    Peripheral osteophyte formation and possible loose body formation along   the posterior recess.  Alignment neutral.  No fractures noted.    Comparative Data: No comparative data available           [place] x-ray 8.2021- 1. No joint effusion, however there are multiple fine calcifications in the suprapatellar  bursa consistent with multiple small loose bodies versus calcified synovium.  2. Tricompartmental degenerative changes with severe chondrocalcinosis of the medial and  lateral menisci.  3. Multiple calcifications posteriorly consistent with intra-articular loose bodies.           Assessment and Plan    Diagnoses and all  orders for this visit:    1. Primary osteoarthritis of right knee (Primary)    2. Right knee pain, unspecified chronicity  -     XR Knee 4+ View Right    3. Chondrocalcinosis    Other orders  -     Large Joint Arthrocentesis: R knee        Discussed the treatment plan with the patient.  I reviewed the x-rays that were obtained today with the patient. Discussed the risks and benefits of a Right Total Knee Arthroplasty vs conservative treatment. Discussed the risks and benefits of a Right knee Synvisc One injection. The patient expressed understanding and wished to proceed. She tolerated the injection well.     Call or return if symptoms worsen or patient has any concerns.       Scribed for Luigi De La Cruz MD by Luigi De La Cruz MD  02/16/2022   10:04 EST         Follow Up   Return in about 6 weeks (around 3/30/2022).  Patient was given instructions and counseling regarding her condition or for health maintenance advice. Please see specific information pulled into the AVS if appropriate.       I have personally performed the services described in this document as scribed by the above individual and it is both accurate and complete.     Luigi De La Cruz MD  02/18/22  10:30 EST

## 2022-03-10 ENCOUNTER — APPOINTMENT (OUTPATIENT)
Dept: GENERAL RADIOLOGY | Facility: HOSPITAL | Age: 82
End: 2022-03-10

## 2022-03-10 ENCOUNTER — HOSPITAL ENCOUNTER (EMERGENCY)
Facility: HOSPITAL | Age: 82
Discharge: HOME | End: 2022-03-10
Attending: EMERGENCY MEDICINE | Admitting: EMERGENCY MEDICINE

## 2022-03-10 VITALS
HEART RATE: 65 BPM | DIASTOLIC BLOOD PRESSURE: 64 MMHG | SYSTOLIC BLOOD PRESSURE: 116 MMHG | OXYGEN SATURATION: 98 % | TEMPERATURE: 97.4 F | RESPIRATION RATE: 16 BRPM | WEIGHT: 150.35 LBS | HEIGHT: 65 IN | BODY MASS INDEX: 25.05 KG/M2

## 2022-03-10 DIAGNOSIS — I48.20 CHRONIC ATRIAL FIBRILLATION: ICD-10-CM

## 2022-03-10 DIAGNOSIS — K21.00 GASTROESOPHAGEAL REFLUX DISEASE WITH ESOPHAGITIS WITHOUT HEMORRHAGE: ICD-10-CM

## 2022-03-10 DIAGNOSIS — R07.9 CHEST PAIN, UNSPECIFIED TYPE: Primary | ICD-10-CM

## 2022-03-10 DIAGNOSIS — N18.9 CHRONIC RENAL IMPAIRMENT, UNSPECIFIED CKD STAGE: ICD-10-CM

## 2022-03-10 LAB
ALBUMIN SERPL-MCNC: 4.6 G/DL (ref 3.5–5.2)
ALBUMIN/GLOB SERPL: 1.8 G/DL
ALP SERPL-CCNC: 134 U/L (ref 39–117)
ALT SERPL W P-5'-P-CCNC: 10 U/L (ref 1–33)
ANION GAP SERPL CALCULATED.3IONS-SCNC: 11.5 MMOL/L (ref 5–15)
AST SERPL-CCNC: 22 U/L (ref 1–32)
BASOPHILS # BLD AUTO: 0.04 10*3/MM3 (ref 0–0.2)
BASOPHILS NFR BLD AUTO: 0.6 % (ref 0–1.5)
BILIRUB SERPL-MCNC: 1.1 MG/DL (ref 0–1.2)
BUN SERPL-MCNC: 28 MG/DL (ref 8–23)
BUN/CREAT SERPL: 16.6 (ref 7–25)
CALCIUM SPEC-SCNC: 10 MG/DL (ref 8.6–10.5)
CHLORIDE SERPL-SCNC: 101 MMOL/L (ref 98–107)
CK MB SERPL-CCNC: 2.48 NG/ML
CK SERPL-CCNC: 79 U/L (ref 20–180)
CO2 SERPL-SCNC: 28.5 MMOL/L (ref 22–29)
CREAT SERPL-MCNC: 1.69 MG/DL (ref 0.57–1)
DEPRECATED RDW RBC AUTO: 42 FL (ref 37–54)
EGFRCR SERPLBLD CKD-EPI 2021: 29.8 ML/MIN/1.73
EOSINOPHIL # BLD AUTO: 0.28 10*3/MM3 (ref 0–0.4)
EOSINOPHIL NFR BLD AUTO: 4.5 % (ref 0.3–6.2)
ERYTHROCYTE [DISTWIDTH] IN BLOOD BY AUTOMATED COUNT: 12.6 % (ref 12.3–15.4)
GLOBULIN UR ELPH-MCNC: 2.6 GM/DL
GLUCOSE SERPL-MCNC: 129 MG/DL (ref 65–99)
HCT VFR BLD AUTO: 33.2 % (ref 34–46.6)
HGB BLD-MCNC: 12 G/DL (ref 12–15.9)
HOLD SPECIMEN: NORMAL
IMM GRANULOCYTES # BLD AUTO: 0.01 10*3/MM3 (ref 0–0.05)
IMM GRANULOCYTES NFR BLD AUTO: 0.2 % (ref 0–0.5)
LIPASE SERPL-CCNC: 52 U/L (ref 13–60)
LYMPHOCYTES # BLD AUTO: 1.99 10*3/MM3 (ref 0.7–3.1)
LYMPHOCYTES NFR BLD AUTO: 32 % (ref 19.6–45.3)
MAGNESIUM SERPL-MCNC: 1.7 MG/DL (ref 1.6–2.4)
MCH RBC QN AUTO: 33.4 PG (ref 26.6–33)
MCHC RBC AUTO-ENTMCNC: 36.1 G/DL (ref 31.5–35.7)
MCV RBC AUTO: 92.5 FL (ref 79–97)
MONOCYTES # BLD AUTO: 0.53 10*3/MM3 (ref 0.1–0.9)
MONOCYTES NFR BLD AUTO: 8.5 % (ref 5–12)
NEUTROPHILS NFR BLD AUTO: 3.36 10*3/MM3 (ref 1.7–7)
NEUTROPHILS NFR BLD AUTO: 54.2 % (ref 42.7–76)
NRBC BLD AUTO-RTO: 0 /100 WBC (ref 0–0.2)
NT-PROBNP SERPL-MCNC: 4578 PG/ML (ref 0–1800)
PLATELET # BLD AUTO: 174 10*3/MM3 (ref 140–450)
PMV BLD AUTO: 9.7 FL (ref 6–12)
POTASSIUM SERPL-SCNC: 3.3 MMOL/L (ref 3.5–5.2)
PROT SERPL-MCNC: 7.2 G/DL (ref 6–8.5)
QT INTERVAL: 452 MS
QT INTERVAL: 478 MS
RBC # BLD AUTO: 3.59 10*6/MM3 (ref 3.77–5.28)
SODIUM SERPL-SCNC: 141 MMOL/L (ref 136–145)
TROPONIN I SERPL-MCNC: 0.01 NG/ML (ref 0–0.6)
TROPONIN I SERPL-MCNC: 0.02 NG/ML (ref 0–0.6)
WBC NRBC COR # BLD: 6.21 10*3/MM3 (ref 3.4–10.8)
WHOLE BLOOD HOLD SPECIMEN: NORMAL
WHOLE BLOOD HOLD SPECIMEN: NORMAL

## 2022-03-10 PROCEDURE — 93005 ELECTROCARDIOGRAM TRACING: CPT | Performed by: EMERGENCY MEDICINE

## 2022-03-10 PROCEDURE — 71045 X-RAY EXAM CHEST 1 VIEW: CPT

## 2022-03-10 PROCEDURE — 84484 ASSAY OF TROPONIN QUANT: CPT

## 2022-03-10 PROCEDURE — 93005 ELECTROCARDIOGRAM TRACING: CPT

## 2022-03-10 PROCEDURE — 82550 ASSAY OF CK (CPK): CPT | Performed by: EMERGENCY MEDICINE

## 2022-03-10 PROCEDURE — 96375 TX/PRO/DX INJ NEW DRUG ADDON: CPT

## 2022-03-10 PROCEDURE — 83880 ASSAY OF NATRIURETIC PEPTIDE: CPT | Performed by: EMERGENCY MEDICINE

## 2022-03-10 PROCEDURE — 25010000002 ONDANSETRON PER 1 MG: Performed by: EMERGENCY MEDICINE

## 2022-03-10 PROCEDURE — 99283 EMERGENCY DEPT VISIT LOW MDM: CPT

## 2022-03-10 PROCEDURE — 96374 THER/PROPH/DIAG INJ IV PUSH: CPT

## 2022-03-10 PROCEDURE — 36415 COLL VENOUS BLD VENIPUNCTURE: CPT

## 2022-03-10 PROCEDURE — 83690 ASSAY OF LIPASE: CPT | Performed by: EMERGENCY MEDICINE

## 2022-03-10 PROCEDURE — 85025 COMPLETE CBC W/AUTO DIFF WBC: CPT | Performed by: EMERGENCY MEDICINE

## 2022-03-10 PROCEDURE — 83735 ASSAY OF MAGNESIUM: CPT | Performed by: EMERGENCY MEDICINE

## 2022-03-10 PROCEDURE — 80053 COMPREHEN METABOLIC PANEL: CPT | Performed by: EMERGENCY MEDICINE

## 2022-03-10 PROCEDURE — 82553 CREATINE MB FRACTION: CPT | Performed by: EMERGENCY MEDICINE

## 2022-03-10 RX ORDER — FAMOTIDINE 20 MG/1
20 TABLET, FILM COATED ORAL 2 TIMES DAILY
Qty: 30 TABLET | Refills: 0 | Status: SHIPPED | OUTPATIENT
Start: 2022-03-10 | End: 2022-03-25

## 2022-03-10 RX ORDER — LIDOCAINE HYDROCHLORIDE 20 MG/ML
15 SOLUTION OROPHARYNGEAL ONCE
Status: COMPLETED | OUTPATIENT
Start: 2022-03-10 | End: 2022-03-10

## 2022-03-10 RX ORDER — ALUMINA, MAGNESIA, AND SIMETHICONE 2400; 2400; 240 MG/30ML; MG/30ML; MG/30ML
15 SUSPENSION ORAL ONCE
Status: COMPLETED | OUTPATIENT
Start: 2022-03-10 | End: 2022-03-10

## 2022-03-10 RX ORDER — ONDANSETRON 2 MG/ML
4 INJECTION INTRAMUSCULAR; INTRAVENOUS ONCE
Status: COMPLETED | OUTPATIENT
Start: 2022-03-10 | End: 2022-03-10

## 2022-03-10 RX ORDER — ASPIRIN 81 MG/1
324 TABLET, CHEWABLE ORAL ONCE
Status: DISCONTINUED | OUTPATIENT
Start: 2022-03-10 | End: 2022-03-10 | Stop reason: HOSPADM

## 2022-03-10 RX ORDER — SODIUM CHLORIDE 0.9 % (FLUSH) 0.9 %
10 SYRINGE (ML) INJECTION AS NEEDED
Status: DISCONTINUED | OUTPATIENT
Start: 2022-03-10 | End: 2022-03-10 | Stop reason: HOSPADM

## 2022-03-10 RX ORDER — SUCRALFATE ORAL 1 G/10ML
1 SUSPENSION ORAL
Qty: 560 ML | Refills: 0 | Status: SHIPPED | OUTPATIENT
Start: 2022-03-10 | End: 2022-03-24

## 2022-03-10 RX ORDER — ONDANSETRON 8 MG/1
8 TABLET, ORALLY DISINTEGRATING ORAL EVERY 8 HOURS PRN
Qty: 30 TABLET | Refills: 0 | Status: SHIPPED | OUTPATIENT
Start: 2022-03-10 | End: 2022-03-20

## 2022-03-10 RX ORDER — FAMOTIDINE 10 MG/ML
20 INJECTION, SOLUTION INTRAVENOUS ONCE
Status: COMPLETED | OUTPATIENT
Start: 2022-03-10 | End: 2022-03-10

## 2022-03-10 RX ADMIN — FAMOTIDINE 20 MG: 10 INJECTION INTRAVENOUS at 02:41

## 2022-03-10 RX ADMIN — ALUMINUM HYDROXIDE, MAGNESIUM HYDROXIDE, AND DIMETHICONE 15 ML: 400; 400; 40 SUSPENSION ORAL at 02:41

## 2022-03-10 RX ADMIN — ONDANSETRON 4 MG: 2 INJECTION INTRAMUSCULAR; INTRAVENOUS at 02:41

## 2022-03-10 RX ADMIN — LIDOCAINE HYDROCHLORIDE 15 ML: 20 SOLUTION ORAL; TOPICAL at 02:41

## 2022-03-10 NOTE — DISCHARGE INSTRUCTIONS
No strenuous activity until released by the cardiologist.     Please return to the emergency room for worsening chest pain, radiating chest pain, shortness of breath, near passing out, passing out, extreme fatigue, extreme sweating, nausea or vomiting or new or worrisome symptoms    Please continue the Protonix as previously prescribed    Discuss  possible need for gastroenterology referral and EGD with your primary care physician     weight-bearing as tolerated

## 2022-03-10 NOTE — ED PROVIDER NOTES
Time: 2:02 AM EST  Arrived by: private car  Chief Complaint: Chest pain  History provided by: Patient  History is limited by: N/A     History of Present Illness:  Patient is a 82 y.o. year old female that presents to the emergency department with chest pain.  Patient states that she has had chest pain that is waxed and waned for 1 week.  She locates the chest pain at the epigastric region and radiation to the left chest.  She also notes some left arm pain.  Again, this has been intermittent for a week.  She she complains of nausea and decreased appetite.  The patient's had no vomiting.  The patient notes no diaphoresis.  The patient's had no near-syncope or syncope.  Patient denies any shortness of breath.  The patient has had no fever, rigors or myalgias.  The patient has no cough.  The patient has the epigastric abdominal pain.  The patient does note that she has gastritis and reflux and takes Protonix for it.  Patient states that she was admitted to the hospital in January for the same pain.  The patient states that she had a normal heart catheterization at that time.  The patient denies any pain in the calves or leg swelling.  Patient denies any back pain.  Has no previous history of DVT or pulmonary embolism.  The patient states she has no cancer.  The patient does not take estrogen.  Patient is not had any recent travel on an airplane.  The patient has not had recent surgery.  Patient does note that she has high cholesterol and hypertension.  Patient states that she never smoked.  Patient does not have diabetes.  The patient does not believe she has family history of coronary disease.  Patient believes she has any irregular heartbeat or atrial fibrillation and also takes Eliquis.  The  was at bedside states that the patient seems to have worse pain with eating.      Similar Symptoms Previously: Yes  Recently seen: Patient was admitted to the hospital for the same symptoms in January      Patient Care  Team  Primary Care Provider: Morris Tanner MD    Past Medical History:     Allergies   Allergen Reactions   • Nitrofurantoin GI Intolerance   • Sulfa Antibiotics Itching   • Latex Rash     Past Medical History:   Diagnosis Date   • Anemia    • GERD (gastroesophageal reflux disease)    • Heart disease    • Hyperlipidemia    • Hypertension    • Kidney disease    • Restless leg      Past Surgical History:   Procedure Laterality Date   • APPENDECTOMY     • CARDIAC CATHETERIZATION N/A 1/7/2022    Procedure: Left Heart Cath;  Surgeon: Hitesh Mcguire MD;  Location: [facility] INVASIVE LOCATION;  Service: Cardiovascular;  Laterality: N/A;   • CARDIAC CATHETERIZATION N/A 1/7/2022    Procedure: Possible Percutaneous Coronary Intervention;  Surgeon: Hitesh Mcguire MD;  Location: [facility] INVASIVE LOCATION;  Service: Cardiovascular;  Laterality: N/A;   • CARDIAC SURGERY     • CHOLECYSTECTOMY       History reviewed. No pertinent family history.    Home Medications:  Prior to Admission medications    Medication Sig Start Date End Date Taking? Authorizing Provider   ALPRAZolam (XANAX) 1 MG tablet Take 1 mg by mouth Every 12 (Twelve) Hours.    Yon Vogt MD   apixaban (Eliquis) 2.5 MG tablet tablet Take 1 tablet by mouth Every 12 (Twelve) Hours. 1/9/22   Salty Freedman MD   atenolol (TENORMIN) 50 MG tablet Take 50 mg by mouth Daily.    ProviderYon MD   hydroCHLOROthiazide (HYDRODIURIL) 25 MG tablet Take 25 mg by mouth Daily.    Yon Vogt MD   HYDROcodone-acetaminophen (NORCO) 7.5-325 MG per tablet Take 1 tablet by mouth Every 6 (Six) Hours As Needed for Moderate Pain .    Yon Vogt MD   pantoprazole (PROTONIX) 40 MG EC tablet Take 40 mg by mouth Daily.    Yon Vogt MD   rOPINIRole (REQUIP) 1 MG tablet Take 1 mg by mouth Every Night.    Yon Vogt MD   simvastatin (Zocor) 40 MG tablet Take 40 mg by mouth Every Night.    Sin  "Historical, MD        Social History:   Social History     Tobacco Use   • Smoking status: Never Smoker   • Smokeless tobacco: Former User     Types: Chew   Vaping Use   • Vaping Use: Never used   Substance Use Topics   • Alcohol use: Never   • Drug use: Never          Record Review:  I have reviewed the patient's records in [facility].     Review of Systems:  Review of Systems   Constitutional: Positive for appetite change. Negative for chills, diaphoresis, fatigue and fever.   HENT: Negative for congestion, postnasal drip, rhinorrhea and sore throat.    Eyes: Negative for photophobia.   Respiratory: Negative for cough, chest tightness and shortness of breath.    Cardiovascular: Positive for chest pain. Negative for palpitations and leg swelling.   Gastrointestinal: Positive for abdominal pain and nausea. Negative for diarrhea and vomiting.   Genitourinary: Negative for difficulty urinating, dysuria, flank pain, frequency, hematuria and urgency.   Musculoskeletal: Positive for arthralgias and back pain. Negative for neck pain and neck stiffness.        Patient has chronic back pain and arthralgias.  This is chronic and unchanged.  The back pain is located in the lower back   Skin: Negative for pallor and rash.   Neurological: Negative for dizziness, syncope, weakness, numbness and headaches.   Hematological: Negative for adenopathy. Does not bruise/bleed easily.   Psychiatric/Behavioral: Negative.            Physical Exam:  /77   Pulse 64   Temp 97.4 °F (36.3 °C) (Oral)   Resp 16   Ht 165.1 cm (65\")   Wt 68.2 kg (150 lb 5.7 oz)   SpO2 100%   BMI 25.02 kg/m²     Physical Exam  Vitals and nursing note reviewed.   Constitutional:       General: She is not in acute distress.     Appearance: Normal appearance. She is not ill-appearing, toxic-appearing or diaphoretic.   HENT:      Head: Normocephalic and atraumatic.      Mouth/Throat:      Mouth: Mucous membranes are moist.   Eyes:      Pupils: Pupils are equal, " round, and reactive to light.   Cardiovascular:      Rate and Rhythm: Normal rate. Rhythm irregular.      Pulses: Normal pulses.           Carotid pulses are 2+ on the right side and 2+ on the left side.       Radial pulses are 2+ on the right side and 2+ on the left side.        Dorsalis pedis pulses are 2+ on the right side and 2+ on the left side.        Posterior tibial pulses are 2+ on the right side and 2+ on the left side.      Heart sounds: Normal heart sounds. No murmur heard.   No systolic murmur is present.   No diastolic murmur is present.  Pulmonary:      Effort: Pulmonary effort is normal. No accessory muscle usage, respiratory distress or retractions.      Breath sounds: Normal breath sounds. No decreased breath sounds, wheezing, rhonchi or rales.   Chest:      Chest wall: No mass, deformity, tenderness or crepitus.   Abdominal:      General: Abdomen is flat. There is no distension or abdominal bruit.      Palpations: Abdomen is soft. There is no mass.      Tenderness: There is abdominal tenderness. There is no right CVA tenderness, left CVA tenderness, guarding or rebound.      Comments: No rigidity  Patient has mild epigastric tenderness.  The patient has no prominent aortic pulsations   Musculoskeletal:         General: No swelling, tenderness or deformity.      Cervical back: Neck supple. No tenderness.      Right lower leg: No tenderness. No edema.      Left lower leg: No tenderness. No edema.   Skin:     General: Skin is warm and dry.      Capillary Refill: Capillary refill takes less than 2 seconds.      Coloration: Skin is not cyanotic, jaundiced or pale.      Findings: No ecchymosis or erythema.   Neurological:      General: No focal deficit present.      Mental Status: She is alert and oriented to person, place, and time. Mental status is at baseline.      Sensory: No sensory deficit.      Motor: No weakness.   Psychiatric:         Mood and Affect: Mood normal.         Behavior: Behavior  normal.                Medications in the Emergency Department:  Medications   sodium chloride 0.9 % flush 10 mL (has no administration in time range)   aspirin chewable tablet 324 mg (324 mg Oral Not Given 3/10/22 0201)   ondansetron (ZOFRAN) injection 4 mg (4 mg Intravenous Given 3/10/22 0241)   famotidine (PEPCID) injection 20 mg (20 mg Intravenous Given 3/10/22 0241)   aluminum-magnesium hydroxide-simethicone (MAALOX MAX) 400-400-40 MG/5ML suspension 15 mL (15 mL Oral Given 3/10/22 0241)   Lidocaine Viscous HCl (XYLOCAINE) 2 % solution 15 mL (15 mL Mouth/Throat Given 3/10/22 0241)        Labs  Lab Results (last 24 hours)     Procedure Component Value Units Date/Time    POC Troponin I with Hold Tube [[id number]] Collected: 03/10/22 0058    Specimen: Blood Updated: 03/10/22 0312    Narrative:      The following orders were created for panel order POC Troponin I with Hold Tube.  Procedure                               Abnormality         Status                     ---------                               -----------         ------                     POC Troponin I[[id number]]                                                              HOLD Troponin-I Tube[[id number]]                             In process                   Please view results for these tests on the individual orders.    CBC & Differential [[id number]]  (Abnormal) Collected: 03/10/22 0058    Specimen: Blood from Arm, Left Updated: 03/10/22 0127    Narrative:      The following orders were created for panel order CBC & Differential.  Procedure                               Abnormality         Status                     ---------                               -----------         ------                     CBC Auto Differential[[id number]]        Abnormal            Final result               Scan Slide[[id number]]                                                                    Please view results for these tests on the individual orders.     Comprehensive Metabolic Panel [[id number]]  (Abnormal) Collected: 03/10/22 0058    Specimen: Blood from Arm, Left Updated: 03/10/22 0126     Glucose 129 mg/dL      BUN 28 mg/dL      Creatinine 1.69 mg/dL      Sodium 141 mmol/L      Potassium 3.3 mmol/L      Chloride 101 mmol/L      CO2 28.5 mmol/L      Calcium 10.0 mg/dL      Total Protein 7.2 g/dL      Albumin 4.60 g/dL      ALT (SGPT) 10 U/L      AST (SGOT) 22 U/L      Alkaline Phosphatase 134 U/L      Total Bilirubin 1.1 mg/dL      Globulin 2.6 gm/dL      A/G Ratio 1.8 g/dL      BUN/Creatinine Ratio 16.6     Anion Gap 11.5 mmol/L      eGFR 29.8 mL/min/1.73      Comment: National Kidney Foundation and American Society of Nephrology (ASN) Task Force recommended calculation based on the Chronic Kidney Disease Epidemiology Collaboration (CKD-EPI) equation refit without adjustment for race.       Narrative:      GFR Normal >60  Chronic Kidney Disease <60  Kidney Failure <15      Lipase [[id number]]  (Normal) Collected: 03/10/22 0058    Specimen: Blood from Arm, Left Updated: 03/10/22 0126     Lipase 52 U/L     BNP [[id number]]  (Abnormal) Collected: 03/10/22 0058    Specimen: Blood from Arm, Left Updated: 03/10/22 0124     proBNP 4,578.0 pg/mL     Narrative:      Among patients with dyspnea, NT-proBNP is highly sensitive for the detection of acute congestive heart failure. In addition NT-proBNP of <300 pg/ml effectively rules out acute congestive heart failure with 99% negative predictive value.    Results may be falsely decreased if patient taking Biotin.      Magnesium [[id number]]  (Normal) Collected: 03/10/22 0058    Specimen: Blood from Arm, Left Updated: 03/10/22 0126     Magnesium 1.7 mg/dL     CK Total & CKMB [[id number]]  (Normal) Collected: 03/10/22 0058    Specimen: Blood from Arm, Left Updated: 03/10/22 0126     CKMB 2.48 ng/mL      Creatine Kinase 79 U/L     Narrative:      CKMB results may be falsely decreased if patient taking Biotin.    CBC Auto  Differential [[id number]]  (Abnormal) Collected: 03/10/22 0058    Specimen: Blood from Arm, Left Updated: 03/10/22 0127     WBC 6.21 10*3/mm3      RBC 3.59 10*6/mm3      Hemoglobin 12.0 g/dL      Hematocrit 33.2 %      MCV 92.5 fL      MCH 33.4 pg      MCHC 36.1 g/dL      RDW 12.6 %      RDW-SD 42.0 fl      MPV 9.7 fL      Platelets 174 10*3/mm3      Neutrophil % 54.2 %      Lymphocyte % 32.0 %      Monocyte % 8.5 %      Eosinophil % 4.5 %      Basophil % 0.6 %      Immature Grans % 0.2 %      Neutrophils, Absolute 3.36 10*3/mm3      Lymphocytes, Absolute 1.99 10*3/mm3      Monocytes, Absolute 0.53 10*3/mm3      Eosinophils, Absolute 0.28 10*3/mm3      Basophils, Absolute 0.04 10*3/mm3      Immature Grans, Absolute 0.01 10*3/mm3      nRBC 0.0 /100 WBC     POC Troponin I [[id number]]  (Normal) Collected: 03/10/22 0104    Specimen: Blood Updated: 03/10/22 0117     Troponin I 0.02 ng/mL      Comment: Serial Number: [id number]:  [id number]       POC Troponin I [[id number]]  (Normal) Collected: 03/10/22 0243    Specimen: Blood Updated: 03/10/22 0255     Troponin I 0.01 ng/mL      Comment: Serial Number: [id number]:  [id number]              Imaging:  XR Chest 1 View   Final Result    No acute cardiopulmonary abnormality.                        KAROL GABRIEL MD          Electronically Signed and Approved By: KAROL GABRIEL MD on 3/10/2022 at 1:27                               Procedures:  Procedures    Progress  ED Course as of 03/10/22 0358   Thu Mar 10, 2022   0048 EKG:    Rhythm: Atrial fibrillation  Rate: 80  Interventricular conduction delay  Intervals: Normal QT interval  Q waves V1, V2, V3, V4, V5  T-wave: Slight baseline artifact probable T wave inversion in aVL, nonspecific T wave flattening in I  ST Segment: No obvious pathological ST elevation or ST depression    EKG Comparison: The QRS and ST segments are unchanged from EKG performed January 4, 2022    Interpreted by me   [SD]   0350 EKG:    Rhythm: Atrial  fibrillation  Rate: 70  Interventricular conduction delay  Intervals: Normal QT interval  Q waves V1, V2, V3, V4  T-wave: T wave flattening in I, T wave inversion in aVL  ST Segment: No obvious pathological ST elevation or ST depression    EKG Comparison: The QRS and ST morphology are unchanged from EKG performed earlier in the department    Interpreted by me   [SD]      ED Course User Index  [SD] Rj Ch DO                            Medical Decision Making:  MDM  Number of Diagnoses or Management Options  Chest pain, unspecified type  Chronic atrial fibrillation (HCC)  Chronic renal impairment, unspecified CKD stage  Gastroesophageal reflux disease with esophagitis without hemorrhage  Diagnosis management comments:     The patient was given Zofran, Pepcid and a GI cocktail emergency room.  The patient was reassessed about an hour later.  The patient states that her symptoms completely went away.  The patient denies chest pain.    The patient had 2 troponins that were within normal limits.  The patient had 2 EKGs that demonstrated no evidence of ST segment elevation MI or other injury pattern    I reviewed the patient's last heart catheterization from January of this year.  Heart catheterization demonstrated no significant coronary disease.    At the time of discharge, the patient was pain-free after the GI cocktail and Pepcid.  The patient states she feels much better and feels comfortable to go home.  Patient appears appropriate for discharge and outpatient follow-up.  The patient will follow up with their primary care physician today for evaluation and discuss possible need for referral for EGD and gastroenterology evaluation.    The patient will also follow-up with her cardiologist in regards to the chest pain and chronic atrial fibrillation.    Patient was given very specific instructions on when and why to return to emergency room.  The patient voiced understanding felt comfortable with those  instructions.  Both the patient and patient's  felt comfortable for discharge and outpatient follow-up.  The patient appears appropriate for discharge and outpatient follow-up.       Amount and/or Complexity of Data Reviewed  Clinical lab tests: reviewed  Tests in the radiology section of CPT®: reviewed  Tests in the medicine section of CPT®: reviewed  Decide to obtain previous medical records or to obtain history from someone other than the patient: yes (PROCEDURE: CT ABDOMEN PELVIS WITH CONTRAST         COMPARISON: [facility], CT, ABD PEL W/O CONTRAST, 12/29/2016, 4:51.         INDICATIONS: Abdominal Pain         TECHNIQUE: After obtaining the patient's consent, CT images were created with non-ionic intravenous     contrast material.           PROTOCOL:   Standard imaging protocol performed          RADIATION:   DLP: 621.1 mGy*cm      Automated exposure control was utilized to minimize radiation dose.          FINDINGS:     Within the lung bases are trace bilateral pleural effusions with left basilar atelectasis.         There is hepatic steatosis.  The gallbladder is surgically absent.  The adrenal glands, spleen, and     pancreas are unremarkable.  There are bilateral renal cysts.         The stomach appears normal.  The small bowel appears normal in caliber and configuration.  There is     sigmoid diverticulosis.  There is suggestion of a 1.3 cm linear radiopaque object within the colon     at the hepatic flexure.  The appendix is surgically absent.  There is no loculated collection.  No     abnormally enlarged lymph nodes are identified.         The rectum and urinary bladder are unremarkable.  There is a small focal calcification in the mid     uterus, possibly a partially calcified fibroid.  There is a small amount of pelvic free fluid.         No aggressive osseous lesions are identified.           CONCLUSION:     1. Trace bilateral pleural effusions with left basilar atelectasis.    2.  Hepatic steatosis.    3. Sigmoid diverticulosis.    4. Suggestion of 1.3 cm linear radiopaque object within colon at hepatic flexure.  Correlate if     there has been any recent colonoscopy with a clipping.    5. Focal calcification within mid uterus, possibly a partially calcified fibroid.    6. Small amount of pelvic free fluid, which is nonspecific.          JAIRO ALVAREZ MD           Electronically Signed and Approved By: JAIRO ALVAREZ MD on 1/15/2021 at 18:41           Surgeon: Hitesh Mcguire MD         INDICATION: Coronary disease with angina pectoris.        PROCEDURE PERFORMED:  Left heart catheterization, Left ventriculography, Selective coronary angiography and Moderate sedation     DESCRIPTION OF PROCEDURE:  I supervised moderate sedation during the duration of the procedure  Under local anesthesia, 4-Scottish sheath was introduced into the right femoral artery via Seldinger technique.  Thereafter 4-Scottish pigtail was advanced into the ascending aorta and placed into the left ventricle.  Left ventricular pressure as well as aortic pressures was recorded.  Thereafter left ventriculogram was done in the Albanian 30-degree position using 10 mL per second of contrast agent for three seconds.  After the left ventriculogram the post injection left ventricular pressure was recorded.  Pullback pressure was recorded across from the left ventricle into the aorta across the aortic valve.  Thereafter the pigtail catheter was removed.  A 4-Scottish JL-4 diagnostic catheter was then advanced with the help of a guidewire into the ascending aorta.  Injection of the left coronary artery was done in the Albanian and KOCH views.  Thereafter JL-4 diagnostic catheter was removed and replaced with a 4-Scottish JR-4 diagnostic catheter and injections were done into the KOCH and Albanian views.  The JL-4 was removed.   The sheath in the right groin was also removed and hemostasis obtained by manual pressure.  The procedure was done  without any complications. Blood loss: None.     RESULTS OF PROCEDURE:  A)  LEFT VENTRICULOGRAPHY:                 Normal-sized left ventricle with good contractility with left ejection fraction of 60%     B)  SELECTIVE CORONARY ANGIOGRAPHY:     1.  LEFT MAIN CORONARY ARTERY   Normal  2.  LEFT ANTERIOR DESCENDING CORONARY ARTERY                 Small vessel and no significant disease  3.  LEFT CIRCUMFLEX CORONARY ARTERY                 Nondominant vessel and no significant disease  4.  RIGHT CORONARY ARTERY                 Normal        CONCLUSION:  Normal-sized left ventricle with good contractility  No significant coronary disease           Electronically signed by Hitesh Mcguire MD, 01/07/22, 1:31 PM EST.   )               Final diagnoses:   Chest pain, unspecified type   Gastroesophageal reflux disease with esophagitis without hemorrhage   Chronic renal impairment, unspecified CKD stage   Chronic atrial fibrillation (HCC)        Disposition:  ED Disposition     ED Disposition   Discharge    Condition   Stable    Comment   --             This medical record created using voice recognition software and may contain unintended errors.    DO Jing Johnson Scott, DO  03/12/22 0059

## 2022-03-23 ENCOUNTER — OFFICE VISIT (OUTPATIENT)
Dept: ORTHOPEDIC SURGERY | Facility: CLINIC | Age: 82
End: 2022-03-23

## 2022-03-23 VITALS — WEIGHT: 147 LBS | BODY MASS INDEX: 24.49 KG/M2 | HEIGHT: 65 IN

## 2022-03-23 DIAGNOSIS — M11.20 CHONDROCALCINOSIS: ICD-10-CM

## 2022-03-23 DIAGNOSIS — M17.11 PRIMARY OSTEOARTHRITIS OF RIGHT KNEE: Primary | ICD-10-CM

## 2022-03-23 PROCEDURE — 99213 OFFICE O/P EST LOW 20 MIN: CPT | Performed by: PHYSICIAN ASSISTANT

## 2022-03-23 NOTE — PROGRESS NOTES
"Chief Complaint  Follow-up of the Right Knee    Subjective          Rupal Buchanan presents to [facility] for   History of Present Illness    Rupal resents today for follow-up of her right knee.  Patient has right knee arthritis that we are treating nonoperatively.  She received a right knee Synvisc injection in her previous appointment.  Today, she states that this injection provided significant relief.  She denies complications following the injection.  She reports only minimal pain.  She is not taking any anti-inflammatories for her knee due to being on Eliquis.  She states that she has not been doing her home exercises but stays very active.  She denies new injuries.  Denies numbness or tingling.    Allergies   Allergen Reactions   • Nitrofurantoin GI Intolerance   • Sulfa Antibiotics Itching   • Latex Rash        Social History     Socioeconomic History   • Marital status:    Tobacco Use   • Smoking status: Never Smoker   • Smokeless tobacco: Former User     Types: Chew   Vaping Use   • Vaping Use: Never used   Substance and Sexual Activity   • Alcohol use: Never   • Drug use: Never        I reviewed the patient's chief complaint, history of present illness, review of systems, past medical history, surgical history, family history, social history, medications, and allergy list.     REVIEW OF SYSTEMS    Constitutional: Denies fevers, chills, weight loss  Cardiovascular: Denies chest pain, shortness of breath  Skin: Denies rashes, acute skin changes  Neurologic: Denies headache, loss of consciousness  MSK: Right knee pain      Objective   Vital Signs:   Ht 165.1 cm (65\")   Wt 66.7 kg (147 lb)   BMI 24.46 kg/m²     Body mass index is 24.46 kg/m².    Physical Exam    General: Alert. No acute distress.   Right lower extremity: No areas of tenderness to palpation.  Moderate swelling.  5 degrees shy of full extension.  Flexion to 120 degrees.  Knee extensor mechanism intact. "  Hip flexion intact.  Knee stable to varus and valgus stress.  Calf soft, nontender.  Demonstrates active ankle plantarflexion and dorsiflexion.  Sensation intact over the dorsal and plantar foot.  Palpable pedal pulses.    Procedures    Imaging Results (Most Recent)     None                Assessment and Plan    Diagnoses and all orders for this visit:    1. Primary osteoarthritis of right knee (Primary)    2. Chondrocalcinosis        Rupal presents today for follow-up of her right knee arthritis that we are treating conservatively.  Patient received a right knee Synvisc injection at her previous appointment and reports significant relief.  Patient instructed to continue with her home exercises.  Continue with activities as tolerated.  We discussed the importance of range of motion exercises.  Patient will follow up in 2 months for reevaluation.  No new x-rays needed at next visit.    Call or return if symptoms worsen or patient has any concerns.       Follow Up   Return in about 2 months (around 5/23/2022).  Patient was given instructions and counseling regarding her condition or for health maintenance advice. Please see specific information pulled into the AVS if appropriate.     Dana Presley PA-C  03/23/22  13:23 EDT

## 2022-06-13 ENCOUNTER — OFFICE VISIT (OUTPATIENT)
Dept: ORTHOPEDIC SURGERY | Facility: CLINIC | Age: 82
End: 2022-06-13

## 2022-06-13 VITALS — HEART RATE: 63 BPM | OXYGEN SATURATION: 99 % | HEIGHT: 65 IN | BODY MASS INDEX: 24.99 KG/M2 | WEIGHT: 150 LBS

## 2022-06-13 DIAGNOSIS — M17.11 PRIMARY OSTEOARTHRITIS OF RIGHT KNEE: Primary | ICD-10-CM

## 2022-06-13 PROCEDURE — 99213 OFFICE O/P EST LOW 20 MIN: CPT | Performed by: STUDENT IN AN ORGANIZED HEALTH CARE EDUCATION/TRAINING PROGRAM

## 2022-06-13 NOTE — PROGRESS NOTES
"Chief Complaint  Pain and Follow-up of the Right Knee    Subjective          Rupal Buchanan presents to [facility] for   History of Present Illness    Rupal presents today for follow-up of her right knee.  Patient has right knee arthritis that we are treating conservatively.  Today, she reports no changes with her right knee pain.  She describes her pain being medially.  She received a right knee Synvisc injection previously and experienced relief initially.  Patient is unable to take anti-inflammatories.  She denies new injuries.  Denies numbness or tingling.    Allergies   Allergen Reactions   • Nitrofurantoin GI Intolerance   • Sulfa Antibiotics Itching   • Latex Rash        Social History     Socioeconomic History   • Marital status:    Tobacco Use   • Smoking status: Never Smoker   • Smokeless tobacco: Former User     Types: Chew   Vaping Use   • Vaping Use: Never used   Substance and Sexual Activity   • Alcohol use: Never   • Drug use: Never        I reviewed the patient's chief complaint, history of present illness, review of systems, past medical history, surgical history, family history, social history, medications, and allergy list.     REVIEW OF SYSTEMS    Constitutional: Denies fevers, chills, weight loss  Cardiovascular: Denies chest pain, shortness of breath  Skin: Denies rashes, acute skin changes  Neurologic: Denies headache, loss of consciousness  MSK: Right knee pain      Objective   Vital Signs:   Pulse 63   Ht 165.1 cm (65\")   Wt 68 kg (150 lb)   SpO2 99%   BMI 24.96 kg/m²     Body mass index is 24.96 kg/m².    Physical Exam    General: Alert. No acute distress.   Right lower extremity: Tenderness to palpation about the medial joint line.  Nontender to lateral.  Mild swelling.  5 degrees shy of full extension.  Flexion 100 degrees.  Knee extensor mechanism intact.  Knee stable to varus and valgus stress.  No pain with passive hip range of motion.  No " pain with Ramesh testing.  Calf soft, nontender.  Demonstrates active ankle plantarflexion dorsiflexion.  Sensation intact over the dorsal and plantar foot.  Palpable pedal pulses.    Procedures    Imaging Results (Most Recent)     None                   Assessment and Plan        No results found.     Diagnoses and all orders for this visit:    1. Primary osteoarthritis of right knee (Primary)        Rupal is in today for follow-up of her right knee arthritis that we are treating conservatively.  Patient is instructed to continue with home exercises.  Continue to use ice and elevation as needed.  We discussed a repeat Synvisc injection in 2 months when patient is eligible.  Patient understood and elected to proceed.  Okay for patient to continue with activities as tolerated.  Discussion with patient regarding referral for COOLIEF treatment if repeat Synvisc injection does not provide sustainable relief.  Patient will follow up in 2 months for Synvisc injection.  No new x-rays needed at next visit.      Call or return if worsening symptoms.    Scribed for Luigi De La Cruz MD by Dana Presley PA-C  06/13/2022   15:46 EDT         Follow Up   Return in about 2 weeks (around 6/27/2022).  Patient was given instructions and counseling regarding her condition or for health maintenance advice. Please see specific information pulled into the AVS if appropriate.       I have personally performed the services described in this document as scribed by the above individual and it is both accurate and complete.     Luigi De La Cruz MD  06/14/22  17:11 EDT           Please continue inhaler therapy, and schedule a close follow up with your PCP within 1 week of discharge You presented with SOB, and were admitted for acute coronary syndrome workup.  CArdiac enzymes were negative, BNP was within normal limits, and your echo was sig for  preserved Lt ventricular systolic function EF >60%, and Grade I DD.  You were seen by pulm, Dr. Desai, who believes you may suffer from a chronic obstructive lung disease vs obstructive sleep apnea.  Please schedule close follow up with your PCP within 1 week of discharge for follow up. Please continue antihypertensive regimen You BP was well controlled.  Please continue current regimen, and schedule a close follow up with your PCP in 1 week for BP check. HbA1c: 6.2 Your diabetes is well controlled.  Please continue current diabetic regimen. Please continue antidepressant therapy. You do not exhibit signs of acute depression.  Please continue current therapy, and follow up with PCP Please continue statin therapy, and schedule close follow up with your PCP Please continue flomax therapy

## 2022-12-04 ENCOUNTER — APPOINTMENT (OUTPATIENT)
Dept: GENERAL RADIOLOGY | Facility: HOSPITAL | Age: 82
End: 2022-12-04

## 2022-12-04 ENCOUNTER — HOSPITAL ENCOUNTER (EMERGENCY)
Facility: HOSPITAL | Age: 82
Discharge: HOME | End: 2022-12-04
Attending: EMERGENCY MEDICINE | Admitting: EMERGENCY MEDICINE

## 2022-12-04 VITALS
TEMPERATURE: 97.6 F | DIASTOLIC BLOOD PRESSURE: 80 MMHG | RESPIRATION RATE: 18 BRPM | BODY MASS INDEX: 24.96 KG/M2 | OXYGEN SATURATION: 99 % | HEIGHT: 65 IN | SYSTOLIC BLOOD PRESSURE: 133 MMHG | HEART RATE: 88 BPM

## 2022-12-04 DIAGNOSIS — S22.31XA CLOSED FRACTURE OF ONE RIB OF RIGHT SIDE, INITIAL ENCOUNTER: Primary | ICD-10-CM

## 2022-12-04 PROCEDURE — 71101 X-RAY EXAM UNILAT RIBS/CHEST: CPT

## 2022-12-04 PROCEDURE — 99283 EMERGENCY DEPT VISIT LOW MDM: CPT

## 2022-12-04 RX ORDER — LIDOCAINE 50 MG/G
1 PATCH TOPICAL ONCE
Status: DISCONTINUED | OUTPATIENT
Start: 2022-12-04 | End: 2022-12-04 | Stop reason: HOSPADM

## 2022-12-04 RX ORDER — HYDROCODONE BITARTRATE AND ACETAMINOPHEN 5; 325 MG/1; MG/1
1 TABLET ORAL ONCE
Status: DISCONTINUED | OUTPATIENT
Start: 2022-12-04 | End: 2022-12-04 | Stop reason: HOSPADM

## 2022-12-04 RX ADMIN — LIDOCAINE 1 PATCH: 50 PATCH CUTANEOUS at 20:42

## 2022-12-04 NOTE — ED PROVIDER NOTES
Time: 5:45 PM EST  Chief Complaint:   Chief Complaint   Patient presents with   • Fall   • Rib Pain           History of Present Illness:  Patient is a 82 y.o. year old female who presents to the emergency department with right rib pain.   reports that he was walking behind her when he tripped over object on the floor, fell into the patient which caused her to fall forward onto a table.  Patient denies any other injuries.          The patient presents to the emergency department in complaints of anterior right rib pain.  She states that her  was walking through the house to go eat dinner.  She states that they both tripped over a heater in the floor and fell to the ground.  He states that he sort of fell on top of her.  She states she did not hit her head or have any loss of consciousness.  She denies any other injuries.  She states that it hurts when she takes a deep breath or bends or twist.  She denies any shortness of breath.  Her breath sounds are clear.  Her airway is patent.  She does have tenderness in that right anterior rib area with no crepitus noted.  She is alert and oriented has a grossly intact neuro exam.  She has been ambulatory without any difficulties.      History provided by:  Patient and spouse   used: No            Patient Care Team  Primary Care Provider: Morris Tanner MD    Past Medical History:     Allergies   Allergen Reactions   • Nitrofurantoin GI Intolerance   • Sulfa Antibiotics Itching   • Latex Rash     Past Medical History:   Diagnosis Date   • Anemia    • GERD (gastroesophageal reflux disease)    • Heart disease    • Hyperlipidemia    • Hypertension    • Kidney disease    • Restless leg      Past Surgical History:   Procedure Laterality Date   • APPENDECTOMY     • CARDIAC CATHETERIZATION N/A 1/7/2022    Procedure: Left Heart Cath;  Surgeon: Hitesh Mcguire MD;  Location: [facility] INV[facility];  Service: Cardiovascular;  Laterality:  N/A;   • CARDIAC CATHETERIZATION N/A 1/7/2022    Procedure: Possible Percutaneous Coronary Intervention;  Surgeon: Hitesh Mcguire MD;  Location: [facility] INVASIVE LOCATION;  Service: Cardiovascular;  Laterality: N/A;   • CARDIAC SURGERY     • CHOLECYSTECTOMY       History reviewed. No pertinent family history.    Home Medications:  Prior to Admission medications    Medication Sig Start Date End Date Taking? Authorizing Provider   ALPRAZolam (XANAX) 1 MG tablet Take 1 mg by mouth Every 12 (Twelve) Hours.    Yon Vogt MD   apixaban (Eliquis) 2.5 MG tablet tablet Take 1 tablet by mouth Every 12 (Twelve) Hours. 1/9/22   Salty Freedman MD   atenolol (TENORMIN) 50 MG tablet Take 50 mg by mouth Daily.    Yon Vogt MD   hydroCHLOROthiazide (HYDRODIURIL) 25 MG tablet Take 25 mg by mouth Daily.    Yon Vogt MD   HYDROcodone-acetaminophen (NORCO) 7.5-325 MG per tablet Take 1 tablet by mouth Every 6 (Six) Hours As Needed for Moderate Pain .    Yon Vogt MD   pantoprazole (PROTONIX) 40 MG EC tablet Take 40 mg by mouth Daily.    Yon Vogt MD   rOPINIRole (REQUIP) 1 MG tablet Take 1 mg by mouth Every Night.    Yon Vogt MD   simvastatin (ZOCOR) 40 MG tablet Take 40 mg by mouth Every Night.    Yon Vogt MD        Social History:   Social History     Tobacco Use   • Smoking status: Never   • Smokeless tobacco: Former     Types: Chew   Vaping Use   • Vaping Use: Never used   Substance Use Topics   • Alcohol use: Never   • Drug use: Never         Review of Systems:  Review of Systems   Constitutional: Negative for chills and fever.   HENT: Negative for congestion, ear pain and sore throat.    Eyes: Negative for pain.   Respiratory: Negative for cough, chest tightness and shortness of breath.    Cardiovascular: Positive for chest pain (IN RIGHT LOWER RIB AREA WITH COUGH/BENDING).   Gastrointestinal: Negative for abdominal pain, diarrhea,  "nausea and vomiting.   Genitourinary: Negative for dysuria, flank pain, hematuria and urgency.   Musculoskeletal: Negative for back pain, joint swelling and neck pain.        Right rib pain   Skin: Negative for pallor and rash.   Neurological: Negative for seizures and headaches.   All other systems reviewed and are negative.       Physical Exam:  /80   Pulse 88   Temp 97.6 °F (36.4 °C)   Resp 18   Ht 165.1 cm (65\")   SpO2 99%   BMI 24.96 kg/m²     Physical Exam  Vitals and nursing note reviewed.   Constitutional:       General: She is not in acute distress.     Appearance: Normal appearance. She is not ill-appearing or toxic-appearing.   HENT:      Head: Normocephalic and atraumatic.      Nose: Nose normal.   Eyes:      General: No scleral icterus.     Pupils: Pupils are equal, round, and reactive to light.   Cardiovascular:      Rate and Rhythm: Normal rate and regular rhythm.      Pulses: Normal pulses.   Pulmonary:      Effort: Pulmonary effort is normal. No respiratory distress.      Breath sounds: Normal breath sounds. No wheezing.   Abdominal:      General: Abdomen is flat. There is no distension.      Palpations: Abdomen is soft.      Tenderness: There is no abdominal tenderness. There is no guarding or rebound.   Musculoskeletal:         General: Normal range of motion.      Cervical back: Normal range of motion and neck supple. No rigidity or tenderness.   Lymphadenopathy:      Cervical: No cervical adenopathy.   Skin:     General: Skin is warm and dry.      Capillary Refill: Capillary refill takes less than 2 seconds.      Findings: No rash.   Neurological:      General: No focal deficit present.      Mental Status: She is alert and oriented to person, place, and time. Mental status is at baseline.   Psychiatric:         Mood and Affect: Mood normal.         Behavior: Behavior normal.                Medications in the Emergency Department:  Medications   lidocaine (LIDODERM) 5 % 1 patch (1 " patch Transdermal Medication Applied 12/4/22 2042)   HYDROcodone-acetaminophen (NORCO) 5-325 MG per tablet 1 tablet (1 tablet Oral Not Given 12/4/22 2000)        Labs  Lab Results (last 24 hours)     ** No results found for the last 24 hours. **           Imaging:  XR Ribs Right With PA Chest    Result Date: 12/4/2022  PROCEDURE: XR RIBS RIGHT W PA CHEST  COMPARISON: [facility], CR, XR CHEST 1 VW, 3/10/2022, 1:01.  INDICATIONS: Right rib pain  FINDINGS:  Mild bibasilar airspace opacity is noted, new in the interval.  The cardiac and mediastinal silhouettes appear normal.  No effusion is seen.  There is a fracture of the lateral right 9th rib.  Alignment is near anatomic.  No other fracture is identified.  No pneumothorax is seen.        1. Mild bibasilar airspace opacity favored to represent atelectasis 2. Nondisplaced fracture of the lateral right 9th rib.     Christopher Dorsey M.D.       Electronically Signed and Approved By: Christopher Dorsey M.D. on 12/04/2022 at 18:56               Procedures:  Procedures    Progress  ED Course as of 12/04/22 2230   Sun Dec 04, 2022   1744 --- PROVIDER IN TRIAGE NOTE ---    The patient was seen and evaluated by me, KEELEY Andrews, in triage. Orders were placed and the patient is currently awaiting disposition. [CW]   2230 The patient was offered pain medications for home and declined stating that she is not sure what she can take and she will follow-up with her family doctor. [TC]      ED Course User Index  [CW] Nusrat Holden APRN  [TC] Dixie Casiano APRN                            The patient was initially evaluated in the triage area where orders were placed. The patient was later dispositioned by KEELEY Bateman.      The patient was advised to stay for completion of workup which includes but is not limited to communication of labs and radiological results, reassessment and plan. The patient was advised that leaving prior to disposition by a provider could  result in critical findings that are not communicated to the patient.     Medical Decision Making:  MDM  Number of Diagnoses or Management Options  Closed fracture of one rib of right side, initial encounter: new and requires workup     Amount and/or Complexity of Data Reviewed  Tests in the radiology section of CPT®: reviewed    Risk of Complications, Morbidity, and/or Mortality  Presenting problems: low  Diagnostic procedures: low  Management options: low    Patient Progress  Patient progress: stable           The following orders were placed after triage and evaluation:  Orders Placed This Encounter   Procedures   • XR Ribs Right With PA Chest   • Incentive Spirometry       Final diagnoses:   Closed fracture of one rib of right side, initial encounter          Disposition:  ED Disposition     ED Disposition   Discharge    Condition   Stable    Comment   --             This medical record created using voice recognition software.           Dixie Casiano, APRN  12/04/22 3531

## 2022-12-05 NOTE — DISCHARGE INSTRUCTIONS
Rest, drink plenty of fluids.  You need to take your Tylenol at home as needed for pain.  Use a folded towel as a splint to help with any deep breathing coughing or bending.  Use the incentive spirometer from the emergency department every hour while awake to help prevent pneumonia.  Follow-up with your family doctor this week for reevaluation and further treatment as necessary.  Return to the emergency department for any acutely developing respiratory distress, any uncontrolled pain at home with your Tylenol or any new or worse concerns.

## 2023-03-25 ENCOUNTER — APPOINTMENT (OUTPATIENT)
Dept: GENERAL RADIOLOGY | Facility: HOSPITAL | Age: 83
End: 2023-03-25
Payer: MEDICARE

## 2023-03-25 ENCOUNTER — HOSPITAL ENCOUNTER (EMERGENCY)
Facility: HOSPITAL | Age: 83
Discharge: HOME | End: 2023-03-25
Attending: EMERGENCY MEDICINE | Admitting: EMERGENCY MEDICINE
Payer: MEDICARE

## 2023-03-25 VITALS
DIASTOLIC BLOOD PRESSURE: 65 MMHG | WEIGHT: 139 LBS | OXYGEN SATURATION: 98 % | HEART RATE: 76 BPM | RESPIRATION RATE: 20 BRPM | BODY MASS INDEX: 23.16 KG/M2 | HEIGHT: 65 IN | SYSTOLIC BLOOD PRESSURE: 109 MMHG | TEMPERATURE: 98.7 F

## 2023-03-25 DIAGNOSIS — F41.1 ANXIETY REACTION: Primary | ICD-10-CM

## 2023-03-25 LAB
ALBUMIN SERPL-MCNC: 4 G/DL (ref 3.5–5.2)
ALBUMIN/GLOB SERPL: 1.5 G/DL
ALP SERPL-CCNC: 103 U/L (ref 39–117)
ALT SERPL W P-5'-P-CCNC: 11 U/L (ref 1–33)
ANION GAP SERPL CALCULATED.3IONS-SCNC: 11.6 MMOL/L (ref 5–15)
AST SERPL-CCNC: 19 U/L (ref 1–32)
BASOPHILS # BLD AUTO: 0.04 10*3/MM3 (ref 0–0.2)
BASOPHILS NFR BLD AUTO: 0.6 % (ref 0–1.5)
BILIRUB SERPL-MCNC: 1.1 MG/DL (ref 0–1.2)
BUN SERPL-MCNC: 33 MG/DL (ref 8–23)
BUN/CREAT SERPL: 21.2 (ref 7–25)
CALCIUM SPEC-SCNC: 9.3 MG/DL (ref 8.6–10.5)
CHLORIDE SERPL-SCNC: 101 MMOL/L (ref 98–107)
CO2 SERPL-SCNC: 26.4 MMOL/L (ref 22–29)
CREAT SERPL-MCNC: 1.56 MG/DL (ref 0.57–1)
DEPRECATED RDW RBC AUTO: 41 FL (ref 37–54)
EGFRCR SERPLBLD CKD-EPI 2021: 32.6 ML/MIN/1.73
EOSINOPHIL # BLD AUTO: 0.13 10*3/MM3 (ref 0–0.4)
EOSINOPHIL NFR BLD AUTO: 1.8 % (ref 0.3–6.2)
ERYTHROCYTE [DISTWIDTH] IN BLOOD BY AUTOMATED COUNT: 13 % (ref 12.3–15.4)
FLUAV AG NPH QL: NEGATIVE
FLUBV AG NPH QL IA: NEGATIVE
GLOBULIN UR ELPH-MCNC: 2.6 GM/DL
GLUCOSE SERPL-MCNC: 92 MG/DL (ref 65–99)
HCT VFR BLD AUTO: 35.1 % (ref 34–46.6)
HGB BLD-MCNC: 12.6 G/DL (ref 12–15.9)
IMM GRANULOCYTES # BLD AUTO: 0.04 10*3/MM3 (ref 0–0.05)
IMM GRANULOCYTES NFR BLD AUTO: 0.6 % (ref 0–0.5)
LIPASE SERPL-CCNC: 46 U/L (ref 13–60)
LYMPHOCYTES # BLD AUTO: 1.84 10*3/MM3 (ref 0.7–3.1)
LYMPHOCYTES NFR BLD AUTO: 26 % (ref 19.6–45.3)
MAGNESIUM SERPL-MCNC: 1.6 MG/DL (ref 1.6–2.4)
MCH RBC QN AUTO: 34.1 PG (ref 26.6–33)
MCHC RBC AUTO-ENTMCNC: 35.9 G/DL (ref 31.5–35.7)
MCV RBC AUTO: 95.1 FL (ref 79–97)
MONOCYTES # BLD AUTO: 0.68 10*3/MM3 (ref 0.1–0.9)
MONOCYTES NFR BLD AUTO: 9.6 % (ref 5–12)
NEUTROPHILS NFR BLD AUTO: 4.34 10*3/MM3 (ref 1.7–7)
NEUTROPHILS NFR BLD AUTO: 61.4 % (ref 42.7–76)
NRBC BLD AUTO-RTO: 0 /100 WBC (ref 0–0.2)
NT-PROBNP SERPL-MCNC: 5638 PG/ML (ref 0–1800)
PLATELET # BLD AUTO: 184 10*3/MM3 (ref 140–450)
PMV BLD AUTO: 10.7 FL (ref 6–12)
POTASSIUM SERPL-SCNC: 3.2 MMOL/L (ref 3.5–5.2)
PROT SERPL-MCNC: 6.6 G/DL (ref 6–8.5)
RBC # BLD AUTO: 3.69 10*6/MM3 (ref 3.77–5.28)
SARS-COV-2 RNA RESP QL NAA+PROBE: NOT DETECTED
SODIUM SERPL-SCNC: 139 MMOL/L (ref 136–145)
WBC NRBC COR # BLD: 7.07 10*3/MM3 (ref 3.4–10.8)

## 2023-03-25 PROCEDURE — 85025 COMPLETE CBC W/AUTO DIFF WBC: CPT | Performed by: EMERGENCY MEDICINE

## 2023-03-25 PROCEDURE — 36415 COLL VENOUS BLD VENIPUNCTURE: CPT

## 2023-03-25 PROCEDURE — 83735 ASSAY OF MAGNESIUM: CPT | Performed by: EMERGENCY MEDICINE

## 2023-03-25 PROCEDURE — C9803 HOPD COVID-19 SPEC COLLECT: HCPCS | Performed by: EMERGENCY MEDICINE

## 2023-03-25 PROCEDURE — U0005 INFEC AGEN DETEC AMPLI PROBE: HCPCS | Performed by: EMERGENCY MEDICINE

## 2023-03-25 PROCEDURE — 87804 INFLUENZA ASSAY W/OPTIC: CPT | Performed by: EMERGENCY MEDICINE

## 2023-03-25 PROCEDURE — 80053 COMPREHEN METABOLIC PANEL: CPT | Performed by: EMERGENCY MEDICINE

## 2023-03-25 PROCEDURE — 83690 ASSAY OF LIPASE: CPT | Performed by: EMERGENCY MEDICINE

## 2023-03-25 PROCEDURE — 71045 X-RAY EXAM CHEST 1 VIEW: CPT

## 2023-03-25 PROCEDURE — 99283 EMERGENCY DEPT VISIT LOW MDM: CPT

## 2023-03-25 PROCEDURE — 83880 ASSAY OF NATRIURETIC PEPTIDE: CPT | Performed by: EMERGENCY MEDICINE

## 2023-03-25 PROCEDURE — U0004 COV-19 TEST NON-CDC HGH THRU: HCPCS | Performed by: EMERGENCY MEDICINE

## 2023-03-25 PROCEDURE — 63710000001 ONDANSETRON ODT 4 MG TABLET DISPERSIBLE: Performed by: EMERGENCY MEDICINE

## 2023-03-25 RX ORDER — ACETAMINOPHEN 325 MG/1
975 TABLET ORAL ONCE
Status: COMPLETED | OUTPATIENT
Start: 2023-03-25 | End: 2023-03-25

## 2023-03-25 RX ORDER — POTASSIUM CHLORIDE 750 MG/1
40 CAPSULE, EXTENDED RELEASE ORAL ONCE
Status: COMPLETED | OUTPATIENT
Start: 2023-03-25 | End: 2023-03-25

## 2023-03-25 RX ORDER — ALPRAZOLAM 1 MG/1
1 TABLET ORAL EVERY 12 HOURS SCHEDULED
Qty: 30 TABLET | Refills: 0 | Status: SHIPPED | OUTPATIENT
Start: 2023-03-25

## 2023-03-25 RX ORDER — ONDANSETRON 4 MG/1
4 TABLET, ORALLY DISINTEGRATING ORAL ONCE
Status: COMPLETED | OUTPATIENT
Start: 2023-03-25 | End: 2023-03-25

## 2023-03-25 RX ADMIN — ACETAMINOPHEN 975 MG: 325 TABLET ORAL at 16:14

## 2023-03-25 RX ADMIN — POTASSIUM CHLORIDE 40 MEQ: 10 CAPSULE, COATED, EXTENDED RELEASE ORAL at 18:10

## 2023-03-25 RX ADMIN — ONDANSETRON 4 MG: 4 TABLET, ORALLY DISINTEGRATING ORAL at 16:14

## 2023-03-25 NOTE — ED PROVIDER NOTES
Time: 5:50 PM EDT  Date of encounter:  3/25/2023  Independent Historian/Clinical History and Information was obtained by:   Patient  Chief Complaint: Cough, nausea, and headache    History is limited by: N/A    History of Present Illness:  Patient is a 83 y.o. year old female who presents to the emergency department for evaluation of cough, nausea, and headache onset 6 days ago. Pt states that she has been coughing with an associated headache and feeling nauseous since Monday. Pt reports vomiting, weakness, fatigue, and anxiousness. PT and  believe that symptoms could be a response to taking 0.5 mg of Ativan vs her normal 1 mg.     HPI    Patient Care Team  Primary Care Provider: Provider, No Known    Past Medical History:     Allergies   Allergen Reactions   • Nitrofurantoin GI Intolerance   • Sulfa Antibiotics Itching   • Latex Rash     Past Medical History:   Diagnosis Date   • Anemia    • GERD (gastroesophageal reflux disease)    • Heart disease    • Hyperlipidemia    • Hypertension    • Kidney disease    • Restless leg      Past Surgical History:   Procedure Laterality Date   • APPENDECTOMY     • CARDIAC CATHETERIZATION N/A 1/7/2022    Procedure: Left Heart Cath;  Surgeon: Hitesh Mcguire MD;  Location: [facility] INVASIVE LOCATION;  Service: Cardiovascular;  Laterality: N/A;   • CARDIAC CATHETERIZATION N/A 1/7/2022    Procedure: Possible Percutaneous Coronary Intervention;  Surgeon: Hitesh Mcguire MD;  Location: [facility] INVASIVE LOCATION;  Service: Cardiovascular;  Laterality: N/A;   • CARDIAC SURGERY     • CHOLECYSTECTOMY       History reviewed. No pertinent family history.    Home Medications:  Prior to Admission medications    Medication Sig Start Date End Date Taking? Authorizing Provider   ALPRAZolam (XANAX) 1 MG tablet Take 1 mg by mouth Every 12 (Twelve) Hours.    Provider, MD Yon   apixaban (Eliquis) 2.5 MG tablet tablet Take 1 tablet by mouth Every 12 (Twelve)  "Hours. 1/9/22   Salty Freedman MD   atenolol (TENORMIN) 50 MG tablet Take 50 mg by mouth Daily.    Yon Vogt MD   hydroCHLOROthiazide (HYDRODIURIL) 25 MG tablet Take 25 mg by mouth Daily.    Yon Vogt MD   HYDROcodone-acetaminophen (NORCO) 7.5-325 MG per tablet Take 1 tablet by mouth Every 6 (Six) Hours As Needed for Moderate Pain .    Yon Vogt MD   pantoprazole (PROTONIX) 40 MG EC tablet Take 40 mg by mouth Daily.    Yon Vogt MD   rOPINIRole (REQUIP) 1 MG tablet Take 1 mg by mouth Every Night.    Yon Vogt MD   simvastatin (ZOCOR) 40 MG tablet Take 40 mg by mouth Every Night.    Yon Vogt MD        Social History:   Social History     Tobacco Use   • Smoking status: Never   • Smokeless tobacco: Former     Types: Chew   Vaping Use   • Vaping Use: Never used   Substance Use Topics   • Alcohol use: Never   • Drug use: Never         Review of Systems:  Review of Systems   Constitutional: Negative for chills and fever.   HENT: Negative for congestion, rhinorrhea and sore throat.    Eyes: Negative for photophobia.   Respiratory: Positive for cough. Negative for apnea, chest tightness and shortness of breath.    Cardiovascular: Negative for chest pain and palpitations.   Gastrointestinal: Positive for nausea and vomiting. Negative for abdominal pain and diarrhea.   Endocrine: Negative.    Genitourinary: Negative for difficulty urinating and dysuria.   Musculoskeletal: Negative for back pain, joint swelling and myalgias.   Skin: Negative for color change and wound.   Allergic/Immunologic: Negative.    Neurological: Positive for weakness. Negative for seizures and headaches.   Psychiatric/Behavioral: Negative.    All other systems reviewed and are negative.       Physical Exam:  /65   Pulse 76   Temp 98.7 °F (37.1 °C) (Oral)   Resp 20   Ht 165.1 cm (65\")   Wt 63 kg (139 lb)   SpO2 98%   BMI 23.13 kg/m²     Physical Exam  Vitals and " nursing note reviewed.   Constitutional:       General: She is awake.      Appearance: Normal appearance.   HENT:      Head: Normocephalic and atraumatic.      Nose: Nose normal.      Mouth/Throat:      Mouth: Mucous membranes are moist.   Eyes:      Extraocular Movements: Extraocular movements intact.      Pupils: Pupils are equal, round, and reactive to light.   Cardiovascular:      Rate and Rhythm: Normal rate and regular rhythm.      Heart sounds: Normal heart sounds.   Pulmonary:      Effort: Pulmonary effort is normal. No respiratory distress.      Breath sounds: Normal breath sounds. No wheezing, rhonchi or rales.   Abdominal:      General: Bowel sounds are normal.      Palpations: Abdomen is soft.      Tenderness: There is no abdominal tenderness. There is no guarding or rebound.      Comments: No rigidity   Musculoskeletal:         General: No tenderness. Normal range of motion.      Cervical back: Normal range of motion and neck supple.   Skin:     General: Skin is warm and dry.      Coloration: Skin is not jaundiced.   Neurological:      General: No focal deficit present.      Mental Status: She is alert and oriented to person, place, and time. Mental status is at baseline.      Sensory: Sensation is intact.      Motor: Motor function is intact.      Coordination: Coordination is intact.   Psychiatric:         Attention and Perception: Attention and perception normal.         Mood and Affect: Affect normal. Mood is anxious (Mild).         Speech: Speech normal.         Behavior: Behavior normal.         Judgment: Judgment normal.                  Procedures:  Procedures      Medical Decision Making:      Comorbidities that affect care:    Chronic kidney disease, hypertension, GERD, hyperlipidemia, heart disease    External Notes reviewed:    See ED course section for encounter and lab review      The following orders were placed and all results were independently analyzed by me:  Orders Placed This  Encounter   Procedures   • Influenza Antigen, Rapid - Swab, Nasopharynx   • COVID-19,APTIMA PANTHER(KEISHA), OJSHUA/BH TANA, NP/OP SWAB IN UTM/VTM/SALINE TRANSPORT MEDIA,24 HR TAT - Swab, Nasopharynx   • XR Chest 1 View   • Comprehensive Metabolic Panel   • Lipase   • BNP   • Magnesium   • CBC Auto Differential   • CBC & Differential       Medications Given in the Emergency Department:  Medications   ondansetron ODT (ZOFRAN-ODT) disintegrating tablet 4 mg (4 mg Oral Given 3/25/23 1614)   acetaminophen (TYLENOL) tablet 975 mg (975 mg Oral Given 3/25/23 1614)   potassium chloride (MICRO-K) CR capsule 40 mEq (40 mEq Oral Given 3/25/23 1810)        ED Course:    ED Course as of 03/25/23 2216   Sat Mar 25, 2023   1746 Lab review: Creatinine 1.691-year ago.  1.56 today.  BNP today is 5638.  1 year ago was 4578. [RP]   1747 Encounter review: Office visit 2/28/2023 with [facility] for lumbar spondylosis [RP]   1757 Both patient and  feel anxiety is her main issue.  She has been on 1 mg dose of Ativan for years and years.  Her primary care provider stopped practicing and her dose was changed to 0.5 mg.  They feel this is her main issue today.  Lab work-up, vital signs unremarkable. [RP]      ED Course User Index  [RP] Paresh Dela Cruz MD       Labs:    Lab Results (last 24 hours)     Procedure Component Value Units Date/Time    Influenza Antigen, Rapid - Swab, Nasopharynx [[id number]]  (Normal) Collected: 03/25/23 1501    Specimen: Swab from Nasopharynx Updated: 03/25/23 1527     Influenza A Ag, EIA Negative     Influenza B Ag, EIA Negative    COVID-19,APTIMA PANTHER(KEISHA), JOSHUA/[facility] TANA, NP/OP SWAB IN UTM/VTM/SALINE TRANSPORT MEDIA,24 HR TAT - Swab, Nasopharynx [[id number]]  (Normal) Collected: 03/25/23 1501    Specimen: Swab from Nasopharynx Updated: 03/25/23 2021     COVID19 Not Detected    Narrative:      Fact sheet for providers: https://www.fda.gov/media/246476/download     Fact sheet for patients:  https://www.fda.gov/media/376834/download    Test performed by RT PCR.    CBC & Differential [[id number]]  (Abnormal) Collected: 03/25/23 1528    Specimen: Blood Updated: 03/25/23 1615    Narrative:      The following orders were created for panel order CBC & Differential.  Procedure                               Abnormality         Status                     ---------                               -----------         ------                     CBC Auto Differential[[id number]]        Abnormal            Final result               Scan Slide[[id number]]                                                                    Please view results for these tests on the individual orders.    BNP [[id number]]  (Abnormal) Collected: 03/25/23 1528    Specimen: Blood Updated: 03/25/23 1619     proBNP 5,638.0 pg/mL     Narrative:      Among patients with dyspnea, NT-proBNP is highly sensitive for the detection of acute congestive heart failure. In addition NT-proBNP of <300 pg/ml effectively rules out acute congestive heart failure with 99% negative predictive value.      CBC Auto Differential [[id number]]  (Abnormal) Collected: 03/25/23 1528    Specimen: Blood Updated: 03/25/23 1615     WBC 7.07 10*3/mm3      RBC 3.69 10*6/mm3      Hemoglobin 12.6 g/dL      Hematocrit 35.1 %      MCV 95.1 fL      MCH 34.1 pg      MCHC 35.9 g/dL      RDW 13.0 %      RDW-SD 41.0 fl      MPV 10.7 fL      Platelets 184 10*3/mm3      Neutrophil % 61.4 %      Lymphocyte % 26.0 %      Monocyte % 9.6 %      Eosinophil % 1.8 %      Basophil % 0.6 %      Immature Grans % 0.6 %      Neutrophils, Absolute 4.34 10*3/mm3      Lymphocytes, Absolute 1.84 10*3/mm3      Monocytes, Absolute 0.68 10*3/mm3      Eosinophils, Absolute 0.13 10*3/mm3      Basophils, Absolute 0.04 10*3/mm3      Immature Grans, Absolute 0.04 10*3/mm3      nRBC 0.0 /100 WBC     Comprehensive Metabolic Panel [[id number]]  (Abnormal) Collected: 03/25/23 1624    Specimen: Blood Updated: 03/25/23  1659     Glucose 92 mg/dL      BUN 33 mg/dL      Creatinine 1.56 mg/dL      Sodium 139 mmol/L      Potassium 3.2 mmol/L      Chloride 101 mmol/L      CO2 26.4 mmol/L      Calcium 9.3 mg/dL      Total Protein 6.6 g/dL      Albumin 4.0 g/dL      ALT (SGPT) 11 U/L      AST (SGOT) 19 U/L      Alkaline Phosphatase 103 U/L      Total Bilirubin 1.1 mg/dL      Globulin 2.6 gm/dL      A/G Ratio 1.5 g/dL      BUN/Creatinine Ratio 21.2     Anion Gap 11.6 mmol/L      eGFR 32.6 mL/min/1.73     Narrative:      GFR Normal >60  Chronic Kidney Disease <60  Kidney Failure <15    The GFR formula is only valid for adults with stable renal function between ages 18 and 70.    Lipase [[id number]]  (Normal) Collected: 03/25/23 1624    Specimen: Blood Updated: 03/25/23 1659     Lipase 46 U/L     Magnesium [[id number]]  (Normal) Collected: 03/25/23 1624    Specimen: Blood Updated: 03/25/23 1659     Magnesium 1.6 mg/dL            Imaging:    XR Chest 1 View    Result Date: 3/25/2023  PROCEDURE: XR CHEST 1 VW  COMPARISON: [facility], CR, XR CHEST 1 VW, 3/10/2022, 1:01.  [facility], CR, XR RIBS RIGHT W PA CHEST, 12/04/2022, 18:31.  INDICATIONS: cough  FINDINGS:   The lungs are well-expanded. The heart and pulmonary vasculature are within normal limits. No pleural effusions are identified. There are no active appearing infiltrates.  IMPRESSION: No active disease.  PASTORA ZHOU MD       Electronically Signed and Approved By: PASTORA ZHOU MD on 3/25/2023 at 15:19                 Differential Diagnosis and Discussion:    Psychiatric: Differential diagnosis includes but is not limited to depression, psychosis, bipolar disorder, anxiety, manic episode, schizophrenia, and substance abuse.    All labs were reviewed and interpreted by me.  All X-rays were independently reviewed by me.  EKG was interpreted by me.    MDM         Patient Care Considerations:          Consultants/Shared Management Plan:    None    Social  Determinants of Health:    Patient is independent, reliable, and has access to care.       Disposition and Care Coordination:    Discharged: The patient is suitable and stable for discharge with no need for consideration of observation or admission.    I have explained the patient´s condition, diagnoses and treatment plan based on the information available to me at this time. I have answered questions and addressed any concerns. The patient has a good  understanding of the patient´s diagnosis, condition, and treatment plan as can be expected at this point. The vital signs have been stable. The patient´s condition is stable and appropriate for discharge from the emergency department.      The patient will pursue further outpatient evaluation with the primary care physician or other designated or consulting physician as outlined in the discharge instructions. They are agreeable to this plan of care and follow-up instructions have been explained in detail. The patient has received these instructions in written format and have expressed an understanding of the discharge instructions. The patient is aware that any significant change in condition or worsening of symptoms should prompt an immediate return to this or the closest emergency department or call to 911.    Final diagnoses:   Anxiety reaction        ED Disposition     ED Disposition   Discharge    Condition   Stable    Comment   --             This medical record created using voice recognition software.           Robles Patino Jr.  03/25/23 9972       Paresh Dela Cruz MD  03/25/23 2662

## 2023-03-25 NOTE — DISCHARGE INSTRUCTIONS
Return to emergency department as needed for worsening of symptoms.  If you go back to your standard 1 mg dose of Ativan and you are not improving then return for further work-up.  Follow-up with your primary care as already planned soon as possible.

## 2023-11-03 ENCOUNTER — TELEPHONE (OUTPATIENT)
Dept: CARDIOLOGY | Facility: HOSPITAL | Age: 83
End: 2023-11-03

## 2023-11-03 NOTE — TELEPHONE ENCOUNTER
The [facility] received a fax that requires your attention. The document has been indexed to the patient’s chart for your review.      Reason for sending: EXTERNAL MEDICAL RECORD NOTIFICATION    Documents Description: DIABETIC SUPPLIES    Name of Sender: [employer]    Date Indexed: 11/3/23    Notes (if needed): SEE FAX IN CHART UNDER MEDICATIONS

## 2023-11-16 ENCOUNTER — TELEPHONE (OUTPATIENT)
Dept: CARDIOLOGY | Facility: HOSPITAL | Age: 83
End: 2023-11-16
Payer: MEDICARE

## 2023-11-16 NOTE — TELEPHONE ENCOUNTER
The [facility] received a fax that requires your attention. The document has been indexed to the patient’s chart for your review.      Reason for sending: EXTERNAL MEDICAL RECORD NOTIFICATION    Documents Description: ORDERS FOR ADV SUPPLY    Name of Sender: [employer]    Date Indexed: 11/16/23    Notes (if needed): SEE FAX IN CHART UNDER PHYSICIAN ORDERS

## 2024-03-15 ENCOUNTER — OFFICE VISIT (OUTPATIENT)
Dept: CARDIOLOGY | Facility: CLINIC | Age: 84
End: 2024-03-15
Payer: MEDICARE

## 2024-03-15 VITALS
HEIGHT: 65 IN | SYSTOLIC BLOOD PRESSURE: 148 MMHG | HEART RATE: 77 BPM | DIASTOLIC BLOOD PRESSURE: 68 MMHG | WEIGHT: 142 LBS | BODY MASS INDEX: 23.66 KG/M2

## 2024-03-15 DIAGNOSIS — I10 HYPERTENSION, ESSENTIAL: ICD-10-CM

## 2024-03-15 DIAGNOSIS — E78.2 HYPERLIPEMIA, MIXED: Primary | ICD-10-CM

## 2024-03-15 DIAGNOSIS — I48.21 PERMANENT ATRIAL FIBRILLATION: ICD-10-CM

## 2024-03-15 PROCEDURE — 1160F RVW MEDS BY RX/DR IN RCRD: CPT | Performed by: SPECIALIST

## 2024-03-15 PROCEDURE — 1159F MED LIST DOCD IN RCRD: CPT | Performed by: SPECIALIST

## 2024-03-15 PROCEDURE — 99204 OFFICE O/P NEW MOD 45 MIN: CPT | Performed by: SPECIALIST

## 2024-03-15 RX ORDER — ATENOLOL 50 MG/1
50 TABLET ORAL DAILY
Qty: 90 TABLET | Refills: 3 | Status: SHIPPED | OUTPATIENT
Start: 2024-03-15

## 2024-03-15 RX ORDER — ERGOCALCIFEROL 1.25 MG/1
1 CAPSULE ORAL WEEKLY
COMMUNITY
Start: 2024-03-04 | End: 2024-06-02

## 2024-03-15 RX ORDER — NALOXONE HYDROCHLORIDE 4 MG/.1ML
SPRAY NASAL
COMMUNITY
Start: 2023-10-31

## 2024-03-15 RX ORDER — POTASSIUM CHLORIDE 750 MG/1
10 TABLET, EXTENDED RELEASE ORAL
COMMUNITY
Start: 2023-04-24 | End: 2024-04-23

## 2024-03-15 RX ORDER — FOLIC ACID 1 MG/1
1 TABLET ORAL DAILY
COMMUNITY

## 2024-03-15 NOTE — PROGRESS NOTES
[facility]   Cardiology Consult Note    Patient Name: Rupal Buchanan  : 1940  Referring Physician: Self Referring  Subjective   Subjective     Reason for Consult/ Chief Complaint:   Chief Complaint   Patient presents with    Atherosclerosis    Chronic Kidney Disease       HPI:  Rupal Buchanan is a 84 y.o. female with history of atrial fibrillation was seeing another cardiologist.  Wants to establish care with me.  No palpitations.  No chest pain.  No syncopal or presyncopal episode.    Review of Systems:    Constitutional no fever,  no weight loss   Skin no rash   Otolaryngeal no difficulty swallowing   Cardiovascular See HPI   Pulmonary no cough, no sputum production   Gastrointestinal no constipation, no diarrhea   Genitourinary no dysuria, no hematuria   Hematologic no easy bruisability, no abnormal bleeding   Musculoskeletal no muscle pain   Neurologic no dizziness, no falls       Personal History     Past Medical History:  Past Medical History:   Diagnosis Date    Anemia     GERD (gastroesophageal reflux disease)     Heart disease     Hyperlipidemia     Hypertension     Kidney disease     Restless leg        Family History: No family history on file.    Social History:  reports that she has never smoked. She has quit using smokeless tobacco.  Her smokeless tobacco use included chew. She reports that she does not drink alcohol and does not use drugs.    Home Medications:  ALPRAZolam, HYDROcodone-acetaminophen, apixaban, atenolol, ergocalciferol, folic acid, hydroCHLOROthiazide, naloxone, pantoprazole, potassium chloride, rOPINIRole, and simvastatin    Allergies:  Allergies   Allergen Reactions    Nitrofurantoin GI Intolerance    Sulfa Antibiotics Itching    Latex Rash       Objective    Objective     Vitals:   Heart Rate:  [74-77] 77  BP: (145-148)/(68-87) 148/68  Body mass index is 23.63 kg/m².  PHYSICAL EXAM:    General Appearance:   well developed  well nourished  HENT:   oropharynx moist  lips  not cyanotic  Neck:  thyroid not enlarged  supple  Respiratory:  no respiratory distress  normal breath sounds  no rales  Cardiovascular:  no jugular venous distention  regular rhythm  apical impulse normal  S1 normal, S2 normal  no S3, no S4   no murmur  no rub, no thrill  carotid pulses normal; no bruit  pedal pulses normal  lower extremity edema: none    Skin:   warm, dry  Psychiatric:  judgement and insight appropriate  normal mood and affect    RESULTS:    EKG reviewed by me and shows atrial fibrillation       Result Review    Result Review:  I have personally reviewed the available results:  [x]  Laboratory  [x]  EKG/Telemetry   [x]  Cardiology/Vascular   [x] Medications  [x]  Old records  Lab Results   Component Value Date    CHOL 135 01/03/2022     Lab Results   Component Value Date    TRIG 97 01/03/2022     Lab Results   Component Value Date    HDL 63 (H) 01/03/2022     Lab Results   Component Value Date    LDL 54 01/03/2022     Lab Results   Component Value Date    VLDL 18 01/03/2022      @RESUFAST(ALT:3)  Results for orders placed during the hospital encounter of 01/02/22    Adult Transthoracic Echo Complete W/ Cont if Necessary Per Protocol    Interpretation Summary  · Calculated left ventricular EF = 53.6% Estimated left ventricular EF was in agreement with the calculated left ventricular EF. Left ventricular systolic function is normal.  · Moderate mitral valve regurgitation is present.  · The aortic valve exhibits sclerosis. Mild aortic valve regurgitation is present.  · The left atrial cavity is mild to moderately dilated     Results for orders placed during the hospital encounter of 01/02/22    Cardiac Catheterization/Vascular Study    Narrative  Left heart cath and Coronary Angiography Report    Patient Name: Rupal Buchanan  Patient ID: [id number]  Date: 1/7/2022  PCP: @PCP@    Surgeon: Hitesh Mcguire MD      INDICATION: Coronary disease with angina pectoris.      PROCEDURE  PERFORMED:  Left heart catheterization, Left ventriculography, Selective coronary angiography and Moderate sedation    DESCRIPTION OF PROCEDURE:  I supervised moderate sedation during the duration of the procedure  Under local anesthesia, 4-Danish sheath was introduced into the right femoral artery via Seldinger technique.  Thereafter 4-Danish pigtail was advanced into the ascending aorta and placed into the left ventricle.  Left ventricular pressure as well as aortic pressures was recorded.  Thereafter left ventriculogram was done in the SELINA 30-degree position using 10 mL per second of contrast agent for three seconds.  After the left ventriculogram the post injection left ventricular pressure was recorded.  Pullback pressure was recorded across from the left ventricle into the aorta across the aortic valve.  Thereafter the pigtail catheter was removed.  A 4-Danish JL-4 diagnostic catheter was then advanced with the help of a guidewire into the ascending aorta.  Injection of the left coronary artery was done in the SELINA and KOCH views.  Thereafter JL-4 diagnostic catheter was removed and replaced with a 4-Danish JR-4 diagnostic catheter and injections were done into the KOCH and SELINA views.  The JL-4 was removed.   The sheath in the right groin was also removed and hemostasis obtained by manual pressure.  The procedure was done without any complications. Blood loss: None.    RESULTS OF PROCEDURE:  A)  LEFT VENTRICULOGRAPHY:  Normal-sized left ventricle with good contractility with left ejection fraction of 60%    B)  SELECTIVE CORONARY ANGIOGRAPHY:    1.  LEFT MAIN CORONARY ARTERY  Normal  2.  LEFT ANTERIOR DESCENDING CORONARY ARTERY  Small vessel and no significant disease  3.  LEFT CIRCUMFLEX CORONARY ARTERY  Nondominant vessel and no significant disease  4.  RIGHT CORONARY ARTERY  Normal      CONCLUSION:  Normal-sized left ventricle with good contractility  No significant coronary disease        Electronically  signed by Hitesh Mcguire MD, 01/07/22, 1:31 PM EST.          Procedures     Impression/Plan  1. Permanent atrial fibrillation with controlled heart rate: Continue Eliquis 2.5 mg twice a day for stroke prevention.  Continue atenolol 50 mg once a day for rate control.  Echocardiogram shows normal left ventricular systolic function.  She has chronic kidney disease.  2.  Moderate mitral regurgitation: Asymptomatic.  3.  Mixed hyperlipidemia: Continue simvastatin 40 mg once a day.  Monitor lipid and hepatic profile.      Electronically signed by Rodrigo Carr MD, 03/15/24, 10:16 AM EDT.

## 2024-08-17 ENCOUNTER — APPOINTMENT (OUTPATIENT)
Dept: GENERAL RADIOLOGY | Facility: HOSPITAL | Age: 84
End: 2024-08-17
Payer: MEDICARE

## 2024-08-17 ENCOUNTER — HOSPITAL ENCOUNTER (EMERGENCY)
Facility: HOSPITAL | Age: 84
Discharge: HOME | End: 2024-08-17
Attending: EMERGENCY MEDICINE
Payer: MEDICARE

## 2024-08-17 VITALS
SYSTOLIC BLOOD PRESSURE: 132 MMHG | WEIGHT: 142.2 LBS | DIASTOLIC BLOOD PRESSURE: 68 MMHG | BODY MASS INDEX: 23.69 KG/M2 | RESPIRATION RATE: 20 BRPM | OXYGEN SATURATION: 99 % | HEART RATE: 60 BPM | TEMPERATURE: 97.9 F | HEIGHT: 65 IN

## 2024-08-17 DIAGNOSIS — N39.0 ACUTE UTI: ICD-10-CM

## 2024-08-17 DIAGNOSIS — I50.9 CONGESTIVE HEART FAILURE, UNSPECIFIED HF CHRONICITY, UNSPECIFIED HEART FAILURE TYPE: Primary | ICD-10-CM

## 2024-08-17 LAB
ALBUMIN SERPL-MCNC: 4.4 G/DL (ref 3.5–5.2)
ALBUMIN/GLOB SERPL: 1.4 G/DL
ALP SERPL-CCNC: 142 U/L (ref 39–117)
ALT SERPL W P-5'-P-CCNC: 10 U/L (ref 1–33)
ANION GAP SERPL CALCULATED.3IONS-SCNC: 11.1 MMOL/L (ref 5–15)
AST SERPL-CCNC: 20 U/L (ref 1–32)
ATMOSPHERIC PRESS: 736.8 MMHG
BACTERIA UR QL AUTO: ABNORMAL /HPF
BASE EXCESS BLDV CALC-SCNC: 4.1 MMOL/L (ref -2–2)
BASOPHILS # BLD AUTO: 0.03 10*3/MM3 (ref 0–0.2)
BASOPHILS NFR BLD AUTO: 0.5 % (ref 0–1.5)
BDY SITE: ABNORMAL
BILIRUB SERPL-MCNC: 0.6 MG/DL (ref 0–1.2)
BILIRUB UR QL STRIP: NEGATIVE
BUN BLDA-MCNC: 46 MG/DL (ref 8–23)
BUN SERPL-MCNC: 39 MG/DL (ref 8–23)
BUN/CREAT SERPL: 19 (ref 7–25)
CA-I BLDA-SCNC: 1.19 MMOL/L (ref 1.13–1.32)
CALCIUM SPEC-SCNC: 9.4 MG/DL (ref 8.6–10.5)
CHLORIDE BLDA-SCNC: 103 MMOL/L (ref 98–107)
CHLORIDE SERPL-SCNC: 101 MMOL/L (ref 98–107)
CLARITY UR: CLEAR
CO2 BLDA-SCNC: 30.8 MMOL/L (ref 23–27)
CO2 SERPL-SCNC: 27.9 MMOL/L (ref 22–29)
COLOR UR: YELLOW
CREAT BLDA-MCNC: 2.18 MG/DL (ref 0.6–1.3)
CREAT SERPL-MCNC: 2.05 MG/DL (ref 0.57–1)
D-LACTATE SERPL-SCNC: 1.2 MMOL/L
DEPRECATED RDW RBC AUTO: 43.4 FL (ref 37–54)
EGFRCR SERPLBLD CKD-EPI 2021: 23.4 ML/MIN/1.73
EOSINOPHIL # BLD AUTO: 0.31 10*3/MM3 (ref 0–0.4)
EOSINOPHIL NFR BLD AUTO: 5.3 % (ref 0.3–6.2)
ERYTHROCYTE [DISTWIDTH] IN BLOOD BY AUTOMATED COUNT: 12.7 % (ref 12.3–15.4)
GLOBULIN UR ELPH-MCNC: 3.1 GM/DL
GLUCOSE BLDC GLUCOMTR-MCNC: 90 MG/DL (ref 65–99)
GLUCOSE BLDC GLUCOMTR-MCNC: 97 MG/DL (ref 70–99)
GLUCOSE SERPL-MCNC: 161 MG/DL (ref 65–99)
GLUCOSE UR STRIP-MCNC: NEGATIVE MG/DL
HCO3 BLDV-SCNC: 30.4 MMOL/L (ref 22–26)
HCT VFR BLD AUTO: 34.6 % (ref 34–46.6)
HGB BLD-MCNC: 12 G/DL (ref 12–15.9)
HGB BLDA-MCNC: 11.7 G/DL (ref 12–18)
HGB UR QL STRIP.AUTO: NEGATIVE
HOLD SPECIMEN: NORMAL
HOLD SPECIMEN: NORMAL
HYALINE CASTS UR QL AUTO: ABNORMAL /LPF
IMM GRANULOCYTES # BLD AUTO: 0.02 10*3/MM3 (ref 0–0.05)
IMM GRANULOCYTES NFR BLD AUTO: 0.3 % (ref 0–0.5)
INHALED O2 CONCENTRATION: 21 %
KETONES UR QL STRIP: NEGATIVE
LEUKOCYTE ESTERASE UR QL STRIP.AUTO: ABNORMAL
LYMPHOCYTES # BLD AUTO: 2.09 10*3/MM3 (ref 0.7–3.1)
LYMPHOCYTES NFR BLD AUTO: 35.5 % (ref 19.6–45.3)
MAGNESIUM SERPL-MCNC: 1.9 MG/DL (ref 1.6–2.4)
MCH RBC QN AUTO: 34.3 PG (ref 26.6–33)
MCHC RBC AUTO-ENTMCNC: 34.7 G/DL (ref 31.5–35.7)
MCV RBC AUTO: 98.9 FL (ref 79–97)
MODALITY: ABNORMAL
MONOCYTES # BLD AUTO: 0.62 10*3/MM3 (ref 0.1–0.9)
MONOCYTES NFR BLD AUTO: 10.5 % (ref 5–12)
NEUTROPHILS NFR BLD AUTO: 2.82 10*3/MM3 (ref 1.7–7)
NEUTROPHILS NFR BLD AUTO: 47.9 % (ref 42.7–76)
NITRITE UR QL STRIP: NEGATIVE
NOTIFIED WHO: ABNORMAL
NOTIFIED WHO: ABNORMAL
NRBC BLD AUTO-RTO: 0 /100 WBC (ref 0–0.2)
NT-PROBNP SERPL-MCNC: 4900 PG/ML (ref 0–1800)
PCO2 BLDV: 52.5 MM HG (ref 41–51)
PH BLDV: 7.37 PH UNITS (ref 7.31–7.41)
PH UR STRIP.AUTO: 6 [PH] (ref 5–8)
PLATELET # BLD AUTO: 176 10*3/MM3 (ref 140–450)
PMV BLD AUTO: 10 FL (ref 6–12)
PO2 BLDV: 23.6 MM HG (ref 35–42)
POTASSIUM BLDA-SCNC: 5.3 MMOL/L (ref 3.5–5)
POTASSIUM SERPL-SCNC: 6.3 MMOL/L (ref 3.5–5.2)
PROT SERPL-MCNC: 7.5 G/DL (ref 6–8.5)
PROT UR QL STRIP: NEGATIVE
RBC # BLD AUTO: 3.5 10*6/MM3 (ref 3.77–5.28)
RBC # UR STRIP: ABNORMAL /HPF
READ BACK: YES
REF LAB TEST METHOD: ABNORMAL
SAO2 % BLDCOV: 38.6 % (ref 45–75)
SODIUM BLD-SCNC: 137 MMOL/L (ref 131–143)
SODIUM SERPL-SCNC: 140 MMOL/L (ref 136–145)
SP GR UR STRIP: 1.01 (ref 1–1.03)
SQUAMOUS #/AREA URNS HPF: ABNORMAL /HPF
TROPONIN T SERPL HS-MCNC: 24 NG/L
UROBILINOGEN UR QL STRIP: ABNORMAL
WBC # UR STRIP: ABNORMAL /HPF
WBC NRBC COR # BLD AUTO: 5.89 10*3/MM3 (ref 3.4–10.8)
WHOLE BLOOD HOLD COAG: NORMAL
WHOLE BLOOD HOLD SPECIMEN: NORMAL

## 2024-08-17 PROCEDURE — 25010000002 FUROSEMIDE PER 20 MG: Performed by: EMERGENCY MEDICINE

## 2024-08-17 PROCEDURE — 93005 ELECTROCARDIOGRAM TRACING: CPT | Performed by: EMERGENCY MEDICINE

## 2024-08-17 PROCEDURE — 87086 URINE CULTURE/COLONY COUNT: CPT | Performed by: EMERGENCY MEDICINE

## 2024-08-17 PROCEDURE — 85025 COMPLETE CBC W/AUTO DIFF WBC: CPT

## 2024-08-17 PROCEDURE — 81001 URINALYSIS AUTO W/SCOPE: CPT

## 2024-08-17 PROCEDURE — 83880 ASSAY OF NATRIURETIC PEPTIDE: CPT | Performed by: EMERGENCY MEDICINE

## 2024-08-17 PROCEDURE — 84484 ASSAY OF TROPONIN QUANT: CPT

## 2024-08-17 PROCEDURE — 80047 BASIC METABLC PNL IONIZED CA: CPT

## 2024-08-17 PROCEDURE — 82948 REAGENT STRIP/BLOOD GLUCOSE: CPT

## 2024-08-17 PROCEDURE — 71045 X-RAY EXAM CHEST 1 VIEW: CPT

## 2024-08-17 PROCEDURE — 83735 ASSAY OF MAGNESIUM: CPT

## 2024-08-17 PROCEDURE — 96374 THER/PROPH/DIAG INJ IV PUSH: CPT

## 2024-08-17 PROCEDURE — 36415 COLL VENOUS BLD VENIPUNCTURE: CPT

## 2024-08-17 PROCEDURE — 83605 ASSAY OF LACTIC ACID: CPT

## 2024-08-17 PROCEDURE — 93005 ELECTROCARDIOGRAM TRACING: CPT

## 2024-08-17 PROCEDURE — 99284 EMERGENCY DEPT VISIT MOD MDM: CPT

## 2024-08-17 PROCEDURE — 82803 BLOOD GASES ANY COMBINATION: CPT

## 2024-08-17 PROCEDURE — 80053 COMPREHEN METABOLIC PANEL: CPT

## 2024-08-17 RX ORDER — CEPHALEXIN 500 MG/1
500 CAPSULE ORAL 3 TIMES DAILY
Qty: 21 CAPSULE | Refills: 0 | Status: SHIPPED | OUTPATIENT
Start: 2024-08-17 | End: 2024-08-24

## 2024-08-17 RX ORDER — SODIUM CHLORIDE 0.9 % (FLUSH) 0.9 %
10 SYRINGE (ML) INJECTION AS NEEDED
Status: DISCONTINUED | OUTPATIENT
Start: 2024-08-17 | End: 2024-08-18 | Stop reason: HOSPADM

## 2024-08-17 RX ORDER — FUROSEMIDE 10 MG/ML
60 INJECTION INTRAMUSCULAR; INTRAVENOUS ONCE
Status: COMPLETED | OUTPATIENT
Start: 2024-08-17 | End: 2024-08-17

## 2024-08-17 RX ADMIN — FUROSEMIDE 60 MG: 10 INJECTION, SOLUTION INTRAMUSCULAR; INTRAVENOUS at 18:48

## 2024-08-17 NOTE — ED PROVIDER NOTES
Time: 4:43 PM EDT  Date of encounter:  8/17/2024  Independent Historian/Clinical History and Information was obtained by:   Patient    History is limited by: N/A    Chief Complaint   Patient presents with    Weakness - Generalized    Bloated    Foot Swelling         History of Present Illness:  Patient is a 84 y.o. year old female who presents to the emergency department for evaluation of weakness.  Patient states that for the past 3 weeks she had had bilateral lower extremity swelling and her provider started her on a water pill.  Patient states that has not gotten any better over the past couple days.  Patient does have a history of chronic kidney disease but is not on dialysis.  Patient also complains that her abdomen has felt swollen and she has been unable to keep anything down.  Bailey Seaver, APRN, PAWANP-C  Complains of generalized weakness.  No falls in the past 48 hours.  Patient denies any current abdominal pain at the time of my history and physical exam.  Denies dysuria.    Patient Care Team  Primary Care Provider: Weston Grayson MD    Past Medical History:     Allergies   Allergen Reactions    Nitrofurantoin GI Intolerance    Sulfa Antibiotics Itching    Latex Rash     Past Medical History:   Diagnosis Date    Anemia     GERD (gastroesophageal reflux disease)     Heart disease     Hyperlipidemia     Hypertension     Kidney disease     Restless leg      Past Surgical History:   Procedure Laterality Date    APPENDECTOMY      CARDIAC CATHETERIZATION N/A 1/7/2022    Procedure: Left Heart Cath;  Surgeon: Hitesh Mcguire MD;  Location: [facility] INVASIVE LOCATION;  Service: Cardiovascular;  Laterality: N/A;    CARDIAC CATHETERIZATION N/A 1/7/2022    Procedure: Possible Percutaneous Coronary Intervention;  Surgeon: Hitesh Mcguire MD;  Location: [facility] INVASIVE LOCATION;  Service: Cardiovascular;  Laterality: N/A;    CARDIAC SURGERY      CHOLECYSTECTOMY       History reviewed. No  pertinent family history.    Home Medications:  Prior to Admission medications    Medication Sig Start Date End Date Taking? Authorizing Provider   ALPRAZolam (XANAX) 1 MG tablet Take 1 tablet by mouth Every 12 (Twelve) Hours. 3/25/23   Paresh Dela Cruz MD   apixaban (Eliquis) 2.5 MG tablet tablet Take 1 tablet by mouth Every 12 (Twelve) Hours. 3/15/24   Rodrigo Carr MD   atenolol (TENORMIN) 50 MG tablet Take 1 tablet by mouth Daily. 3/15/24   Rodrigo Carr MD   folic acid (FOLVITE) 1 MG tablet Take 1 tablet by mouth Daily.    Yon Vogt MD   hydroCHLOROthiazide (HYDRODIURIL) 25 MG tablet Take 1 tablet by mouth Daily.    Yon Vogt MD   HYDROcodone-acetaminophen (NORCO) 7.5-325 MG per tablet Take 1 tablet by mouth Every 6 (Six) Hours As Needed for Moderate Pain.    Yon Vogt MD   naloxone (Narcan) 4 MG/0.1ML nasal spray one spray in one nostril as needed for over sedation or respiratory depression from opioid use. Repeat one spray in alternate nostril every 2-3 minutes until responsive or EMS arrives 10/31/23   Yon Vogt MD   pantoprazole (PROTONIX) 40 MG EC tablet Take 1 tablet by mouth Daily.    Yon Vogt MD   rOPINIRole (REQUIP) 1 MG tablet Take 1 tablet by mouth Every Night.    Yon Vogt MD   simvastatin (ZOCOR) 40 MG tablet Take 1 tablet by mouth Every Night.    Yon Vogt MD        Social History:   Social History     Tobacco Use    Smoking status: Never    Smokeless tobacco: Former     Types: Chew   Vaping Use    Vaping status: Never Used   Substance Use Topics    Alcohol use: Never    Drug use: Never         Review of Systems:  Review of Systems   Constitutional:  Positive for appetite change. Negative for chills and fever.   HENT:  Negative for congestion, rhinorrhea and sore throat.    Eyes:  Negative for photophobia.   Respiratory:  Negative for apnea, cough, chest tightness and shortness of breath.   "  Cardiovascular:  Negative for chest pain and palpitations.   Gastrointestinal:  Positive for abdominal distention. Negative for abdominal pain, constipation, diarrhea, nausea and vomiting.   Endocrine: Negative.    Genitourinary:  Negative for difficulty urinating and dysuria.   Musculoskeletal:  Positive for joint swelling. Negative for back pain and myalgias.   Skin:  Negative for color change and wound.   Allergic/Immunologic: Negative.    Neurological:  Positive for weakness. Negative for dizziness, seizures and headaches.   Psychiatric/Behavioral: Negative.     All other systems reviewed and are negative.       Physical Exam:  /68   Pulse 60   Temp 97.9 °F (36.6 °C) (Oral)   Resp 20   Ht 165.1 cm (65\")   Wt 64.5 kg (142 lb 3.2 oz)   SpO2 99%   BMI 23.66 kg/m²         Physical Exam  Vitals and nursing note reviewed.   Constitutional:       General: She is awake.      Appearance: Normal appearance.   HENT:      Head: Normocephalic and atraumatic.      Nose: Nose normal.      Mouth/Throat:      Mouth: Mucous membranes are moist.   Eyes:      Extraocular Movements: Extraocular movements intact.      Pupils: Pupils are equal, round, and reactive to light.   Cardiovascular:      Rate and Rhythm: Normal rate and regular rhythm.      Heart sounds: Normal heart sounds.   Pulmonary:      Effort: Pulmonary effort is normal. No respiratory distress.      Breath sounds: Normal breath sounds. No wheezing, rhonchi or rales.   Abdominal:      General: Bowel sounds are normal.      Palpations: Abdomen is soft.      Tenderness: There is no abdominal tenderness. There is no guarding or rebound.      Comments: No rigidity   Musculoskeletal:         General: No tenderness. Normal range of motion.      Cervical back: Normal range of motion and neck supple.      Comments: Trace nonpitting edema bilaterally   Skin:     General: Skin is warm and dry.      Coloration: Skin is not jaundiced.   Neurological:      General: " No focal deficit present.      Mental Status: She is alert. Mental status is at baseline.   Psychiatric:      Comments: Flat affect                      Procedures:  Procedures      Medical Decision Making:      Comorbidities that affect care:    Hypertension, GERD, anemia, hyperlipidemia, atrial fibrillation    External Notes reviewed:    Telephone Encounter:    Telephone Encounter - Philly Horton - 08/06/2024 3:30 PM EDT  Formatting of this note might be different from the original.  Let her know  Electronically signed by Philly Horton at 08/06/2024 3:30 PM EDT   Back to top of Miscellaneous Notes  Telephone Encounter - Farooq Linda APRN - 08/06/2024 12:11 PM EDT  Formatting of this note might be different from the original.  Can you let her know I sent in Lasix 20 mg daily and 10 meq potassium to take as needed for swelling/once per day. I don't have good cell coverage her in [place], I can call later this afternoon.  Electronically signed by Farooq Linda APRN at 08/06/2024 12:12 PM EDT   Back to top of Miscellaneous Notes  Telephone Encounter - Lauren Morel - 08/06/2024 11:42 AM EDT  Formatting of this note might be different from the original.  Pt called again. She stated that the blood pressure medication she was on had a fluid pill in with it, but the one she is on now does not. She stated that she is still having swelling in her feet, and wants to know if there is something else she can take to help get the fluid off. Please advise. Pt call back number is [phone]. -RR  Electronically signed by Lauren Morel at 08/06/2024 11:43 AM EDT   Back to top of Miscellaneous Notes  Telephone Encounter - Philly Horton - 08/05/2024 1:23 PM EDT  Formatting of this note might be different from the original.  Called to ask if she could take anything for fluid building up on her feet. Stated it hurts and she is uncomfortable.  Electronically signed by Philly Horton at 08/05/2024 1:25 PM EDT   Back to top of Miscellaneous  Notes    The following orders were placed and all results were independently analyzed by me:  Orders Placed This Encounter   Procedures    Urine Culture - Urine,    XR Chest 1 View    [place] Draw    Comprehensive Metabolic Panel    Single High Sensitivity Troponin T    Magnesium    Urinalysis With Microscopic If Indicated (No Culture) - Urine, Clean Catch    CBC Auto Differential    Urinalysis, Microscopic Only - Urine, Clean Catch    Venous Blood Gas, Lytes, Lactate    BNP    Blood Gas, Venous -    Undress & Gown    Continuous Pulse Oximetry    Vital Signs    POC Chem Panel    POC Lactate    POC Glucose Once    ECG 12 Lead ED Triage Standing Order; Weak / Dizzy / AMS    CBC & Differential    Green Top (Gel)    Lavender Top    Gold Top - SST    Light Blue Top       Medications Given in the Emergency Department:  Medications   furosemide (LASIX) injection 60 mg (60 mg Intravenous Given 8/17/24 1848)        ED Course:    The patient was initially evaluated in the triage area where orders were placed. The patient was later dispositioned by Paresh Dela Cruz MD.      The patient was advised to stay for completion of workup which includes but is not limited to communication of labs and radiological results, reassessment and plan. The patient was advised that leaving prior to disposition by a provider could result in critical findings that are not communicated to the patient.     ED Course as of 08/18/24 0236   Sat Aug 17, 2024   1647 PROVIDER IN TRIAGE  Patient was evaluated by me in triage, Bailey Seaver, APRN, NICK-INDIA.  Orders were placed and patient is currently awaiting final results and disposition. [AS]   1818 I have personally interpreted the EKG today and it shows no evidence of any acute ischemia.  Atrial fibrillation, rate controlled. [RP]      ED Course User Index  [AS] Seaver, Alyce B, APRN  [RP] Paresh Dela Cruz MD       Labs:    Lab Results (last 24 hours)       Procedure Component Value Units Date/Time     CBC & Differential [[id number]]  (Abnormal) Collected: 08/17/24 1653    Specimen: Blood from Arm, Left Updated: 08/17/24 1736    Narrative:      The following orders were created for panel order CBC & Differential.  Procedure                               Abnormality         Status                     ---------                               -----------         ------                     CBC Auto Differential[[id number]]        Abnormal            Final result               Scan Slide[[id number]]                                                                    Please view results for these tests on the individual orders.    Comprehensive Metabolic Panel [[id number]]  (Abnormal) Collected: 08/17/24 1653    Specimen: Blood from Arm, Left Updated: 08/17/24 1735     Glucose 161 mg/dL      BUN 39 mg/dL      Creatinine 2.05 mg/dL      Sodium 140 mmol/L      Potassium 6.3 mmol/L      Comment: Slight hemolysis detected by analyzer. Result may be falsely elevated.        Chloride 101 mmol/L      CO2 27.9 mmol/L      Calcium 9.4 mg/dL      Total Protein 7.5 g/dL      Albumin 4.4 g/dL      ALT (SGPT) 10 U/L      AST (SGOT) 20 U/L      Comment: Slight hemolysis detected by analyzer. Result may be falsely elevated.        Alkaline Phosphatase 142 U/L      Total Bilirubin 0.6 mg/dL      Globulin 3.1 gm/dL      A/G Ratio 1.4 g/dL      BUN/Creatinine Ratio 19.0     Anion Gap 11.1 mmol/L      eGFR 23.4 mL/min/1.73     Narrative:      GFR Normal >60  Chronic Kidney Disease <60  Kidney Failure <15    The GFR formula is only valid for adults with stable renal function between ages 18 and 70.    Single High Sensitivity Troponin T [[id number]]  (Abnormal) Collected: 08/17/24 1653    Specimen: Blood from Arm, Left Updated: 08/17/24 1728     HS Troponin T 24 ng/L     Narrative:      High Sensitive Troponin T Reference Range:  <14.0 ng/L- Negative Female for AMI  <22.0 ng/L- Negative Male for AMI  >=14 - Abnormal Female indicating possible  myocardial injury.  >=22 - Abnormal Male indicating possible myocardial injury.   Clinicians would have to utilize clinical acumen, EKG, Troponin, and serial changes to determine if it is an Acute Myocardial Infarction or myocardial injury due to an underlying chronic condition.         Magnesium [[id number]]  (Normal) Collected: 08/17/24 1653    Specimen: Blood from Arm, Left Updated: 08/17/24 1735     Magnesium 1.9 mg/dL     CBC Auto Differential [[id number]]  (Abnormal) Collected: 08/17/24 1653    Specimen: Blood from Arm, Left Updated: 08/17/24 1736     WBC 5.89 10*3/mm3      RBC 3.50 10*6/mm3      Hemoglobin 12.0 g/dL      Hematocrit 34.6 %      MCV 98.9 fL      MCH 34.3 pg      MCHC 34.7 g/dL      RDW 12.7 %      RDW-SD 43.4 fl      MPV 10.0 fL      Platelets 176 10*3/mm3      Neutrophil % 47.9 %      Lymphocyte % 35.5 %      Monocyte % 10.5 %      Eosinophil % 5.3 %      Basophil % 0.5 %      Immature Grans % 0.3 %      Neutrophils, Absolute 2.82 10*3/mm3      Lymphocytes, Absolute 2.09 10*3/mm3      Monocytes, Absolute 0.62 10*3/mm3      Eosinophils, Absolute 0.31 10*3/mm3      Basophils, Absolute 0.03 10*3/mm3      Immature Grans, Absolute 0.02 10*3/mm3      nRBC 0.0 /100 WBC     Narrative:      Pre-warmed due to strong cold-agglutinins.    BNP [[id number]]  (Abnormal) Collected: 08/17/24 1653    Specimen: Blood from Arm, Left Updated: 08/17/24 1832     proBNP 4,900.0 pg/mL     Narrative:      This assay is used as an aid in the diagnosis of individuals suspected of having heart failure. It can be used as an aid in the diagnosis of acute decompensated heart failure (ADHF) in patients presenting with signs and symptoms of ADHF to the emergency department (ED). In addition, NT-proBNP of <300 pg/mL indicates ADHF is not likely.    Age Range Result Interpretation  NT-proBNP Concentration (pg/mL:      <50             Positive            >450                   Gray                 300-450                     Negative             <300    50-75           Positive            >900                  Gray                300-900                  Negative            <300      >75             Positive            >1800                  Gray                300-1800                  Negative            <300    Urinalysis With Microscopic If Indicated (No Culture) - Urine, Clean Catch [[id number]]  (Abnormal) Collected: 08/17/24 1703    Specimen: Urine, Clean Catch Updated: 08/17/24 1712     Color, UA Yellow     Appearance, UA Clear     pH, UA 6.0     Specific Gravity, UA 1.006     Glucose, UA Negative     Ketones, UA Negative     Bilirubin, UA Negative     Blood, UA Negative     Protein, UA Negative     Leuk Esterase, UA Small (1+)     Nitrite, UA Negative     Urobilinogen, UA 0.2 E.U./dL    Urinalysis, Microscopic Only - Urine, Clean Catch [[id number]]  (Abnormal) Collected: 08/17/24 1703    Specimen: Urine, Clean Catch Updated: 08/17/24 1716     RBC, UA None Seen /HPF      WBC, UA 3-5 /HPF      Bacteria, UA 2+ /HPF      Squamous Epithelial Cells, UA 0-2 /HPF      Hyaline Casts, UA 0-2 /LPF      Methodology Manual Light Microscopy    Urine Culture - Urine, Urine, Clean Catch [[id number]] Collected: 08/17/24 1703    Specimen: Urine, Clean Catch Updated: 08/17/24 1818    POC Chem Panel [[id number]]  (Abnormal) Collected: 08/17/24 1857    Specimen: Venous Blood Updated: 08/17/24 1901     Glucose 90 mg/dL      Comment: Serial Number: [id number]:  [id number]        Sodium 137 mmol/L      POC Potassium 5.3 mmol/L      Ionized Calcium 1.19 mmol/L      Chloride 103 mmol/L      Creatinine 2.18 mg/dL      BUN 46 mg/dL      CO2 Content 30.8 mmol/L      Notified Who Dr Dela Cruz     Read Back Yes    Blood Gas, Venous - [[id number]]  (Abnormal) Collected: 08/17/24 1857    Specimen: Venous Blood Updated: 08/17/24 1901     Site Right Brachial     pH, Venous 7.371 pH Units      pCO2, Venous 52.5 mm Hg      pO2, Venous 23.6 mm Hg      HCO3, Venous 30.4  mmol/L      Base Excess, Venous 4.1 mmol/L      Comment: Serial Number: [id number]:  [id number]        O2 Saturation, Venous 38.6 %      Hemoglobin, Blood Gas 11.7 g/dL      Barometric Pressure for Blood Gas 736.8000 mmHg      Modality Room Air     FIO2 21 %      Notified Who Dr Dela Cruz    POC Lactate [[id number]]  (Normal) Collected: 08/17/24 1857    Specimen: Venous Blood Updated: 08/17/24 1901     Lactate 1.2 mmol/L      Comment: Serial Number: [id number]:  [id number]       POC Glucose Once [[id number]]  (Normal) Collected: 08/17/24 2148    Specimen: Blood Updated: 08/17/24 2151     Glucose 97 mg/dL      Comment: Serial Number: [id number]:  [id number]                Imaging:    XR Chest 1 View    Result Date: 8/17/2024  XR CHEST 1 VW Date of Exam: 8/17/2024 5:13 PM EDT Indication: Weak/Dizzy/AMS triage protocol Comparison: Chest radiograph 3/25/2023 Findings: Mediastinum: Cardiomediastinal silhouette appears unchanged. Lungs: The lungs are clear. Pleura: No pleural effusion or pneumothorax. Bones and soft tissues: No acute osseous abnormality.     Impression: No acute cardiopulmonary abnormality. Electronically Signed: Mateusz Kumar  8/17/2024 5:40 PM EDT  Workstation ID: [id number]       Differential Diagnosis and Discussion:      Edema: Based on the patient's history, signs, and symptoms, the differential diagnosis includes but is not limited to heart failure, kidney disease, liver disease, venous insufficiency, lymphedema, pregnancy, medications, allergic reactions.  Weakness: Based on the patient's history, signs, and symptoms, the diffential diagnosis includes but is not limited to meningitis, stroke, sepsis, subarachnoid hemorrhage, intracranial bleeding, encephalitis, acute uti, dehydration, MS, myasthenia gravis, Guillan [place], migraine variant, neuromuscular disorders vertigo, electrolyte imbalance, and metabolic disorders.    All labs were reviewed and interpreted by me.  All X-rays impressions were  independently interpreted by me.  EKG was interpreted by me.    MDM     Amount and/or Complexity of Data Reviewed  Decide to obtain previous medical records or to obtain history from someone other than the patient: yes                 Patient Care Considerations:          Consultants/Shared Management Plan:    None    Social Determinants of Health:    Patient is independent, reliable, and has access to care.       Disposition and Care Coordination:    Discharged: I considered escalation of care by admitting this patient to the hospital, however patient is hemodynamically stable and clinically very well-appearing.  Her potassium was initially from a hemolyzed specimen and repeat shows a level of 5.3 which does not warrant admission.    I have explained the patient´s condition, diagnoses and treatment plan based on the information available to me at this time. I have answered questions and addressed any concerns. The patient has a good  understanding of the patient´s diagnosis, condition, and treatment plan as can be expected at this point. The vital signs have been stable. The patient´s condition is stable and appropriate for discharge from the emergency department.      The patient will pursue further outpatient evaluation with the primary care physician or other designated or consulting physician as outlined in the discharge instructions. They are agreeable to this plan of care and follow-up instructions have been explained in detail. The patient has received these instructions in written format and has expressed an understanding of the discharge instructions. The patient is aware that any significant change in condition or worsening of symptoms should prompt an immediate return to this or the closest emergency department or call to 911.    Final diagnoses:   Congestive heart failure, unspecified HF chronicity, unspecified heart failure type   Acute UTI        ED Disposition       ED Disposition   Discharge     Condition   Stable    Comment   --               This medical record created using voice recognition software.             Paresh Dela Cruz MD  08/18/24 [id number]

## 2024-08-18 LAB
QT INTERVAL: 453 MS
QTC INTERVAL: 516 MS

## 2024-08-18 NOTE — DISCHARGE INSTRUCTIONS
Return to emergency department immediately for worsening of symptoms.  Take antibiotics as prescribed.  Call your family doctor first thing Monday morning for follow-up.

## 2024-08-19 LAB — BACTERIA SPEC AEROBE CULT: NORMAL

## 2024-11-06 DIAGNOSIS — E78.2 HYPERLIPEMIA, MIXED: ICD-10-CM

## 2024-11-07 ENCOUNTER — TELEPHONE (OUTPATIENT)
Dept: CARDIOLOGY | Facility: CLINIC | Age: 84
End: 2024-11-07
Payer: MEDICARE

## 2024-11-07 NOTE — TELEPHONE ENCOUNTER
----- Message from Swapna Franklin sent at 11/6/2024 11:11 AM EST -----  Lipid panel and liver enzymes are stable, continue current dose of simvastatin

## 2024-11-08 NOTE — PROGRESS NOTES
[facility]  Cardiology progress Note    Patient Name: Rupal Buchanan  : 1940    CHIEF COMPLAINT  A-fib        Subjective   Subjective     HISTORY OF PRESENT ILLNESS    Rupal Buchanan is a 84 y.o. female with history of atrial fibrillation.  No chest pain.    REVIEW OF SYSTEMS    Constitutional:    No fever, no weight loss  Skin:     No rash  Otolaryngeal:    No difficulty swallowing  Cardiovascular: See HPI.  Pulmonary:    No cough, no sputum production    Personal History     Social History:    reports that she has never smoked. She has quit using smokeless tobacco.  Her smokeless tobacco use included chew. She reports that she does not drink alcohol and does not use drugs.    Home Medications:  Current Outpatient Medications on File Prior to Visit   Medication Sig    ALPRAZolam (XANAX) 1 MG tablet Take 1 tablet by mouth Every 12 (Twelve) Hours.    amLODIPine (NORVASC) 5 MG tablet Take 1 tablet by mouth Daily.    apixaban (Eliquis) 2.5 MG tablet tablet Take 1 tablet by mouth Every 12 (Twelve) Hours.    atenolol (TENORMIN) 50 MG tablet Take 1 tablet by mouth Daily.    folic acid (FOLVITE) 1 MG tablet Take 1 tablet by mouth Daily.    furosemide (LASIX) 20 MG tablet Take 1 tablet by mouth Daily.    HYDROcodone-acetaminophen (NORCO) 7.5-325 MG per tablet Take 1 tablet by mouth Every 6 (Six) Hours As Needed for Moderate Pain.    naloxone (Narcan) 4 MG/0.1ML nasal spray one spray in one nostril as needed for over sedation or respiratory depression from opioid use. Repeat one spray in alternate nostril every 2-3 minutes until responsive or EMS arrives    pantoprazole (PROTONIX) 40 MG EC tablet Take 1 tablet by mouth Daily.    potassium chloride (KLOR-CON M20) 20 MEQ CR tablet Take 0.5 tablets by mouth Daily.    rOPINIRole (REQUIP) 1 MG tablet Take 1 tablet by mouth Every Night.    simvastatin (ZOCOR) 40 MG tablet Take 1 tablet by mouth Every Night.    [DISCONTINUED] hydroCHLOROthiazide  (HYDRODIURIL) 25 MG tablet Take 1 tablet by mouth Daily. (Patient not taking: Reported on 11/12/2024)     No current facility-administered medications on file prior to visit.       Past Medical History:   Diagnosis Date    Anemia     GERD (gastroesophageal reflux disease)     Heart disease     Hyperlipidemia     Hypertension     Kidney disease     Restless leg        Allergies:  Allergies   Allergen Reactions    Nitrofurantoin GI Intolerance    Sulfa Antibiotics Itching    Latex Rash       Objective    Objective       Vitals:   Heart Rate:  [82] 82  BP: (150)/(96) 150/96  Body mass index is 23.96 kg/m².     PHYSICAL EXAM:    General Appearance:   well developed  well nourished  HENT:   oropharynx moist  lips not cyanotic  Neck:  thyroid not enlarged  supple  Respiratory:  no respiratory distress  normal breath sounds  no rales  Cardiovascular:  no jugular venous distention  regular rhythm  apical impulse normal  S1 normal, S2 normal  no S3, no S4   Sm gr 3/6 AA  no rub, no thrill  carotid pulses normal; no bruit  pedal pulses normal  lower extremity edema: none    Skin:   warm, dry  Psychiatric:  judgement and insight appropriate  normal mood and affect        Result Review:  I have personally reviewed the available results from  [x]  Laboratory  [x]  EKG  [x]  Cardiology  [x]  Medications  [x]  Old records  []  Other:     Procedures  Lab Results   Component Value Date    CHOL 135 01/03/2022     Lab Results   Component Value Date    TRIG 97 01/03/2022     Lab Results   Component Value Date    HDL 63 (H) 01/03/2022     Lab Results   Component Value Date    LDL 54 01/03/2022     Lab Results   Component Value Date    VLDL 18 01/03/2022     Results for orders placed during the hospital encounter of 01/02/22    Adult Transthoracic Echo Complete W/ Cont if Necessary Per Protocol    Interpretation Summary  · Calculated left ventricular EF = 53.6% Estimated left ventricular EF was in agreement with the calculated left  ventricular EF. Left ventricular systolic function is normal.  · Moderate mitral valve regurgitation is present.  · The aortic valve exhibits sclerosis. Mild aortic valve regurgitation is present.  · The left atrial cavity is mild to moderately dilated     Impression/Plan:   1. Permanent atrial fibrillation with controlled heart rate: Continue Eliquis 2.5 mg twice a day for stroke prevention.  Continue atenolol 50 mg once a day for rate control.  Echocardiogram shows normal left ventricular systolic function.  She has chronic kidney disease.  2.  Moderate mitral regurgitation: Asymptomatic.  3.  Mixed hyperlipidemia: Continue simvastatin 40 mg once a day.  Monitor lipid and hepatic profile.  4.  Essential hypertension controlled: Blood pressure is controlled at home.  Continue amlodipine 5 mg once a day.        Rodrigo Carr MD   11/12/24   13:57 EST

## 2024-11-12 ENCOUNTER — OFFICE VISIT (OUTPATIENT)
Dept: CARDIOLOGY | Facility: CLINIC | Age: 84
End: 2024-11-12
Payer: MEDICARE

## 2024-11-12 VITALS
BODY MASS INDEX: 23.99 KG/M2 | SYSTOLIC BLOOD PRESSURE: 150 MMHG | DIASTOLIC BLOOD PRESSURE: 96 MMHG | HEIGHT: 65 IN | WEIGHT: 144 LBS | HEART RATE: 82 BPM

## 2024-11-12 DIAGNOSIS — I10 HYPERTENSION, ESSENTIAL: ICD-10-CM

## 2024-11-12 DIAGNOSIS — I48.19 PERSISTENT ATRIAL FIBRILLATION: Primary | ICD-10-CM

## 2024-11-12 DIAGNOSIS — E78.2 HYPERLIPEMIA, MIXED: ICD-10-CM

## 2024-11-12 DIAGNOSIS — I34.0 NONRHEUMATIC MITRAL VALVE REGURGITATION: ICD-10-CM

## 2024-11-12 PROCEDURE — 99214 OFFICE O/P EST MOD 30 MIN: CPT | Performed by: SPECIALIST

## 2024-11-12 RX ORDER — AMLODIPINE BESYLATE 5 MG/1
1 TABLET ORAL DAILY
COMMUNITY
Start: 2024-07-26 | End: 2025-07-26

## 2024-11-12 RX ORDER — POTASSIUM CHLORIDE 1500 MG/1
10 TABLET, EXTENDED RELEASE ORAL DAILY
COMMUNITY
Start: 2024-08-06 | End: 2025-08-06

## 2024-11-12 RX ORDER — FUROSEMIDE 20 MG/1
20 TABLET ORAL DAILY
COMMUNITY
Start: 2024-08-06 | End: 2025-08-06

## 2024-11-12 RX ORDER — SIMVASTATIN 40 MG
40 TABLET ORAL NIGHTLY
Qty: 90 TABLET | Refills: 4 | Status: SHIPPED | OUTPATIENT
Start: 2024-11-12

## 2024-11-12 RX ORDER — ATENOLOL 50 MG/1
50 TABLET ORAL DAILY
Qty: 90 TABLET | Refills: 3 | Status: SHIPPED | OUTPATIENT
Start: 2024-11-12

## 2024-12-13 ENCOUNTER — TELEPHONE (OUTPATIENT)
Dept: CARDIOLOGY | Facility: CLINIC | Age: 84
End: 2024-12-13
Payer: MEDICARE

## 2024-12-13 NOTE — TELEPHONE ENCOUNTER
NABEELIS PAP APPLICATION RECEIVED FROM  AND FAXED TO [employer] FOR REVIEW.  SCANNED COPY IN MEDIA.  WAITING ON A DETERMINATION.

## 2024-12-30 ENCOUNTER — APPOINTMENT (OUTPATIENT)
Dept: CT IMAGING | Facility: HOSPITAL | Age: 84
End: 2024-12-30
Payer: MEDICARE

## 2024-12-30 ENCOUNTER — HOSPITAL ENCOUNTER (EMERGENCY)
Facility: HOSPITAL | Age: 84
Discharge: HOME | End: 2024-12-30
Attending: EMERGENCY MEDICINE | Admitting: EMERGENCY MEDICINE
Payer: MEDICARE

## 2024-12-30 VITALS
HEIGHT: 65 IN | WEIGHT: 143.74 LBS | SYSTOLIC BLOOD PRESSURE: 128 MMHG | HEART RATE: 86 BPM | RESPIRATION RATE: 18 BRPM | TEMPERATURE: 97.9 F | DIASTOLIC BLOOD PRESSURE: 84 MMHG | OXYGEN SATURATION: 99 % | BODY MASS INDEX: 23.95 KG/M2

## 2024-12-30 DIAGNOSIS — K52.9 GASTROENTERITIS: Primary | ICD-10-CM

## 2024-12-30 LAB
ALBUMIN SERPL-MCNC: 4.5 G/DL (ref 3.5–5.2)
ALBUMIN/GLOB SERPL: 1.5 G/DL
ALP SERPL-CCNC: 119 U/L (ref 39–117)
ALT SERPL W P-5'-P-CCNC: 9 U/L (ref 1–33)
ANION GAP SERPL CALCULATED.3IONS-SCNC: 10.3 MMOL/L (ref 5–15)
AST SERPL-CCNC: 18 U/L (ref 1–32)
BACTERIA UR QL AUTO: NORMAL /HPF
BASOPHILS # BLD AUTO: 0.04 10*3/MM3 (ref 0–0.2)
BASOPHILS NFR BLD AUTO: 0.7 % (ref 0–1.5)
BILIRUB SERPL-MCNC: 1 MG/DL (ref 0–1.2)
BILIRUB UR QL STRIP: NEGATIVE
BUN SERPL-MCNC: 27 MG/DL (ref 8–23)
BUN/CREAT SERPL: 13.8 (ref 7–25)
CALCIUM SPEC-SCNC: 9.7 MG/DL (ref 8.6–10.5)
CHLORIDE SERPL-SCNC: 104 MMOL/L (ref 98–107)
CLARITY UR: CLEAR
CO2 SERPL-SCNC: 27.7 MMOL/L (ref 22–29)
COLOR UR: YELLOW
CREAT SERPL-MCNC: 1.95 MG/DL (ref 0.57–1)
D-LACTATE SERPL-SCNC: 0.9 MMOL/L (ref 0.5–2)
DEPRECATED RDW RBC AUTO: 43.6 FL (ref 37–54)
EGFRCR SERPLBLD CKD-EPI 2021: 24.8 ML/MIN/1.73
EOSINOPHIL # BLD AUTO: 0.29 10*3/MM3 (ref 0–0.4)
EOSINOPHIL NFR BLD AUTO: 4.8 % (ref 0.3–6.2)
ERYTHROCYTE [DISTWIDTH] IN BLOOD BY AUTOMATED COUNT: 12.5 % (ref 12.3–15.4)
GLOBULIN UR ELPH-MCNC: 3 GM/DL
GLUCOSE SERPL-MCNC: 109 MG/DL (ref 65–99)
GLUCOSE UR STRIP-MCNC: NEGATIVE MG/DL
HCT VFR BLD AUTO: 31.8 % (ref 34–46.6)
HGB BLD-MCNC: 11.5 G/DL (ref 12–15.9)
HGB UR QL STRIP.AUTO: ABNORMAL
HOLD SPECIMEN: NORMAL
HOLD SPECIMEN: NORMAL
HYALINE CASTS UR QL AUTO: NORMAL /LPF
IMM GRANULOCYTES # BLD AUTO: 0.01 10*3/MM3 (ref 0–0.05)
IMM GRANULOCYTES NFR BLD AUTO: 0.2 % (ref 0–0.5)
KETONES UR QL STRIP: NEGATIVE
LEUKOCYTE ESTERASE UR QL STRIP.AUTO: ABNORMAL
LIPASE SERPL-CCNC: 38 U/L (ref 13–60)
LYMPHOCYTES # BLD AUTO: 2.13 10*3/MM3 (ref 0.7–3.1)
LYMPHOCYTES NFR BLD AUTO: 35.4 % (ref 19.6–45.3)
MCH RBC QN AUTO: 36.1 PG (ref 26.6–33)
MCHC RBC AUTO-ENTMCNC: 36.2 G/DL (ref 31.5–35.7)
MCV RBC AUTO: 99.7 FL (ref 79–97)
MONOCYTES # BLD AUTO: 0.63 10*3/MM3 (ref 0.1–0.9)
MONOCYTES NFR BLD AUTO: 10.5 % (ref 5–12)
NEUTROPHILS NFR BLD AUTO: 2.91 10*3/MM3 (ref 1.7–7)
NEUTROPHILS NFR BLD AUTO: 48.4 % (ref 42.7–76)
NITRITE UR QL STRIP: NEGATIVE
NRBC BLD AUTO-RTO: 0 /100 WBC (ref 0–0.2)
PH UR STRIP.AUTO: 5.5 [PH] (ref 5–8)
PLATELET # BLD AUTO: 173 10*3/MM3 (ref 140–450)
PMV BLD AUTO: 9.5 FL (ref 6–12)
POTASSIUM SERPL-SCNC: 4 MMOL/L (ref 3.5–5.2)
PROT SERPL-MCNC: 7.5 G/DL (ref 6–8.5)
PROT UR QL STRIP: NEGATIVE
RBC # BLD AUTO: 3.19 10*6/MM3 (ref 3.77–5.28)
RBC # UR STRIP: NORMAL /HPF
REF LAB TEST METHOD: NORMAL
SODIUM SERPL-SCNC: 142 MMOL/L (ref 136–145)
SP GR UR STRIP: 1.01 (ref 1–1.03)
SQUAMOUS #/AREA URNS HPF: NORMAL /HPF
UROBILINOGEN UR QL STRIP: ABNORMAL
WBC # UR STRIP: NORMAL /HPF
WBC NRBC COR # BLD AUTO: 6.01 10*3/MM3 (ref 3.4–10.8)
WHOLE BLOOD HOLD COAG: NORMAL
WHOLE BLOOD HOLD SPECIMEN: NORMAL

## 2024-12-30 PROCEDURE — 83690 ASSAY OF LIPASE: CPT

## 2024-12-30 PROCEDURE — 99284 EMERGENCY DEPT VISIT MOD MDM: CPT

## 2024-12-30 PROCEDURE — 81001 URINALYSIS AUTO W/SCOPE: CPT

## 2024-12-30 PROCEDURE — 74176 CT ABD & PELVIS W/O CONTRAST: CPT

## 2024-12-30 PROCEDURE — 36415 COLL VENOUS BLD VENIPUNCTURE: CPT

## 2024-12-30 PROCEDURE — 85025 COMPLETE CBC W/AUTO DIFF WBC: CPT

## 2024-12-30 PROCEDURE — 83605 ASSAY OF LACTIC ACID: CPT

## 2024-12-30 PROCEDURE — 80053 COMPREHEN METABOLIC PANEL: CPT

## 2024-12-30 RX ORDER — ONDANSETRON 4 MG/1
4 TABLET, ORALLY DISINTEGRATING ORAL EVERY 8 HOURS PRN
Qty: 14 TABLET | Refills: 0 | Status: SHIPPED | OUTPATIENT
Start: 2024-12-30

## 2024-12-30 RX ORDER — SODIUM CHLORIDE 0.9 % (FLUSH) 0.9 %
10 SYRINGE (ML) INJECTION AS NEEDED
Status: DISCONTINUED | OUTPATIENT
Start: 2024-12-30 | End: 2024-12-30 | Stop reason: HOSPADM

## 2024-12-30 NOTE — ED PROVIDER NOTES
Time: 3:57 PM EST  Date of encounter:  12/30/2024  Independent Historian/Clinical History and Information was obtained by:   Patient    History is limited by: N/A    Chief Complaint: Diarrhea      History of Present Illness:  Patient is a 84 y.o. year old female who presents to the emergency department for evaluation of diarrhea and vomiting that started last night.  Patient states she baked potato and apples last night.  Also having suprapubic pain.  Denies fevers or exposure to illness      Patient Care Team  Primary Care Provider: Weston Grayson MD    Past Medical History:     Allergies   Allergen Reactions    Nitrofurantoin GI Intolerance    Sulfa Antibiotics Itching    Latex Rash     Past Medical History:   Diagnosis Date    Anemia     GERD (gastroesophageal reflux disease)     Heart disease     Hyperlipidemia     Hypertension     Kidney disease     Restless leg      Past Surgical History:   Procedure Laterality Date    APPENDECTOMY      CARDIAC CATHETERIZATION N/A 1/7/2022    Procedure: Left Heart Cath;  Surgeon: Hitesh Mcguire MD;  Location: [facility] INVASIVE LOCATION;  Service: Cardiovascular;  Laterality: N/A;    CARDIAC CATHETERIZATION N/A 1/7/2022    Procedure: Possible Percutaneous Coronary Intervention;  Surgeon: Hitesh Mcguire MD;  Location: [facility] INVASIVE LOCATION;  Service: Cardiovascular;  Laterality: N/A;    CARDIAC SURGERY      CHOLECYSTECTOMY       History reviewed. No pertinent family history.    Home Medications:  Prior to Admission medications    Medication Sig Start Date End Date Taking? Authorizing Provider   ALPRAZolam (XANAX) 1 MG tablet Take 1 tablet by mouth Every 12 (Twelve) Hours. 3/25/23   Paresh Dela Cruz MD   amLODIPine (NORVASC) 5 MG tablet Take 1 tablet by mouth Daily. 7/26/24 7/26/25  Provider, MD Yon   apixaban (Eliquis) 2.5 MG tablet tablet Take 1 tablet by mouth Every 12 (Twelve) Hours. 11/12/24   Rodrigo Carr MD   atenolol  (TENORMIN) 50 MG tablet Take 1 tablet by mouth Daily. 11/12/24   Rodrigo Carr MD   folic acid (FOLVITE) 1 MG tablet Take 1 tablet by mouth Daily.    Yon Vogt MD   furosemide (LASIX) 20 MG tablet Take 1 tablet by mouth Daily. 8/6/24 8/6/25  Yon Vogt MD   HYDROcodone-acetaminophen (NORCO) 7.5-325 MG per tablet Take 1 tablet by mouth Every 6 (Six) Hours As Needed for Moderate Pain.    Yon Vogt MD   naloxone (Narcan) 4 MG/0.1ML nasal spray one spray in one nostril as needed for over sedation or respiratory depression from opioid use. Repeat one spray in alternate nostril every 2-3 minutes until responsive or EMS arrives 10/31/23   Yon Vogt MD   pantoprazole (PROTONIX) 40 MG EC tablet Take 1 tablet by mouth Daily.    Yon Vogt MD   potassium chloride (KLOR-CON M20) 20 MEQ CR tablet Take 0.5 tablets by mouth Daily. 8/6/24 8/6/25  Yon Vogt MD   rOPINIRole (REQUIP) 1 MG tablet Take 1 tablet by mouth Every Night.    Yon Vogt MD   simvastatin (ZOCOR) 40 MG tablet Take 1 tablet by mouth Every Night. 11/12/24   Rodrigo Carr MD        Social History:   Social History     Tobacco Use    Smoking status: Never    Smokeless tobacco: Former     Types: Chew   Vaping Use    Vaping status: Never Used   Substance Use Topics    Alcohol use: Never    Drug use: Never         Review of Systems:  Review of Systems   Constitutional:  Negative for chills and fever.   HENT:  Negative for congestion, rhinorrhea and sore throat.    Eyes:  Negative for pain and visual disturbance.   Respiratory:  Negative for apnea, cough, chest tightness and shortness of breath.    Cardiovascular:  Negative for chest pain and palpitations.   Gastrointestinal:  Positive for diarrhea, nausea and vomiting. Negative for abdominal pain.   Genitourinary:  Negative for difficulty urinating and dysuria.   Musculoskeletal:  Negative for joint swelling and myalgias.  "  Skin:  Negative for color change.   Neurological:  Negative for seizures and headaches.   Psychiatric/Behavioral: Negative.     All other systems reviewed and are negative.       Physical Exam:  /84   Pulse 86   Temp 97.9 °F (36.6 °C) (Oral)   Resp 18   Ht 165.1 cm (65\")   Wt 65.2 kg (143 lb 11.8 oz)   SpO2 99%   BMI 23.92 kg/m²     Physical Exam  Vitals and nursing note reviewed.   Constitutional:       General: She is not in acute distress.     Appearance: Normal appearance. She is not toxic-appearing.   HENT:      Head: Normocephalic and atraumatic.      Jaw: There is normal jaw occlusion.      Mouth/Throat:      Mouth: Mucous membranes are moist.   Eyes:      General: Lids are normal.      Extraocular Movements: Extraocular movements intact.      Conjunctiva/sclera: Conjunctivae normal.      Pupils: Pupils are equal, round, and reactive to light.   Cardiovascular:      Rate and Rhythm: Normal rate and regular rhythm.      Pulses: Normal pulses.      Heart sounds: Normal heart sounds.   Pulmonary:      Effort: Pulmonary effort is normal. No respiratory distress.      Breath sounds: Normal breath sounds. No wheezing or rhonchi.   Abdominal:      General: Abdomen is flat. There is no distension.      Palpations: Abdomen is soft.      Tenderness: There is no abdominal tenderness. There is no guarding or rebound.   Musculoskeletal:         General: Normal range of motion.      Cervical back: Normal range of motion and neck supple.      Right lower leg: No edema.      Left lower leg: No edema.   Skin:     General: Skin is warm and dry.   Neurological:      General: No focal deficit present.      Mental Status: She is alert and oriented to person, place, and time. Mental status is at baseline.   Psychiatric:         Mood and Affect: Mood normal.         Behavior: Behavior normal.                    Medical Decision Making:      Comorbidities that affect care:    CKD    External Notes " reviewed:    Previous Clinic Note: Nephrology office visit for CKD management      The following orders were placed and all results were independently analyzed by me:  Orders Placed This Encounter   Procedures    CT Abdomen Pelvis Without Contrast    [place] Draw    Comprehensive Metabolic Panel    Lipase    Urinalysis With Microscopic If Indicated (No Culture) - Urine, Clean Catch    Lactic Acid, Plasma    CBC Auto Differential    Urinalysis, Microscopic Only - Urine, Clean Catch    Undress & Gown    CBC & Differential    Green Top (Gel)    Lavender Top    Gold Top - SST    Light Blue Top       Medications Given in the Emergency Department:  Medications - No data to display       ED Course:    ED Course as of 12/30/24 2221   Mon Dec 30, 2024   1557 --- PROVIDER IN TRIAGE NOTE ---    The patient was evaluated by Terri castro in triage. Orders were placed and the patient is currently awaiting disposition.    [AJ]      ED Course User Index  [AJ] Terri Lewis PA-C       Labs:    Lab Results (last 24 hours)       Procedure Component Value Units Date/Time    CBC & Differential [[id number]]  (Abnormal) Collected: 12/30/24 1607    Specimen: Blood from Arm, Left Updated: 12/30/24 1615    Narrative:      The following orders were created for panel order CBC & Differential.  Procedure                               Abnormality         Status                     ---------                               -----------         ------                     CBC Auto Differential[[id number]]        Abnormal            Final result                 Please view results for these tests on the individual orders.    Comprehensive Metabolic Panel [[id number]]  (Abnormal) Collected: 12/30/24 1607    Specimen: Blood from Arm, Left Updated: 12/30/24 1634     Glucose 109 mg/dL      BUN 27 mg/dL      Creatinine 1.95 mg/dL      Sodium 142 mmol/L      Potassium 4.0 mmol/L      Chloride 104 mmol/L      CO2 27.7 mmol/L      Calcium 9.7 mg/dL       Total Protein 7.5 g/dL      Albumin 4.5 g/dL      ALT (SGPT) 9 U/L      AST (SGOT) 18 U/L      Alkaline Phosphatase 119 U/L      Total Bilirubin 1.0 mg/dL      Globulin 3.0 gm/dL      A/G Ratio 1.5 g/dL      BUN/Creatinine Ratio 13.8     Anion Gap 10.3 mmol/L      eGFR 24.8 mL/min/1.73     Narrative:      GFR Categories in Chronic Kidney Disease (CKD)      GFR Category          GFR (mL/min/1.73)    Interpretation  G1                     90 or greater         Normal or high (1)  G2                      60-89                Mild decrease (1)  G3a                   45-59                Mild to moderate decrease  G3b                   30-44                Moderate to severe decrease  G4                    15-29                Severe decrease  G5                    14 or less           Kidney failure          (1)In the absence of evidence of kidney disease, neither GFR category G1 or G2 fulfill the criteria for CKD.    eGFR calculation 2021 CKD-EPI creatinine equation, which does not include race as a factor    Lipase [[id number]]  (Normal) Collected: 12/30/24 1607    Specimen: Blood from Arm, Left Updated: 12/30/24 1634     Lipase 38 U/L     Lactic Acid, Plasma [[id number]]  (Normal) Collected: 12/30/24 1607    Specimen: Blood from Arm, Left Updated: 12/30/24 1633     Lactate 0.9 mmol/L     CBC Auto Differential [[id number]]  (Abnormal) Collected: 12/30/24 1607    Specimen: Blood from Arm, Left Updated: 12/30/24 1615     WBC 6.01 10*3/mm3      RBC 3.19 10*6/mm3      Hemoglobin 11.5 g/dL      Hematocrit 31.8 %      MCV 99.7 fL      MCH 36.1 pg      MCHC 36.2 g/dL      RDW 12.5 %      RDW-SD 43.6 fl      MPV 9.5 fL      Platelets 173 10*3/mm3      Neutrophil % 48.4 %      Lymphocyte % 35.4 %      Monocyte % 10.5 %      Eosinophil % 4.8 %      Basophil % 0.7 %      Immature Grans % 0.2 %      Neutrophils, Absolute 2.91 10*3/mm3      Lymphocytes, Absolute 2.13 10*3/mm3      Monocytes, Absolute 0.63 10*3/mm3       Eosinophils, Absolute 0.29 10*3/mm3      Basophils, Absolute 0.04 10*3/mm3      Immature Grans, Absolute 0.01 10*3/mm3      nRBC 0.0 /100 WBC     Urinalysis With Microscopic If Indicated (No Culture) - Urine, Clean Catch [[id number]]  (Abnormal) Collected: 12/30/24 1621    Specimen: Urine, Clean Catch Updated: 12/30/24 1649     Color, UA Yellow     Appearance, UA Clear     pH, UA 5.5     Specific Gravity, UA 1.007     Glucose, UA Negative     Ketones, UA Negative     Bilirubin, UA Negative     Blood, UA Trace     Protein, UA Negative     Leuk Esterase, UA Trace     Nitrite, UA Negative     Urobilinogen, UA 0.2 E.U./dL    Urinalysis, Microscopic Only - Urine, Clean Catch [[id number]] Collected: 12/30/24 1621    Specimen: Urine, Clean Catch Updated: 12/30/24 1649     RBC, UA 0-2 /HPF      WBC, UA 0-2 /HPF      Bacteria, UA None Seen /HPF      Squamous Epithelial Cells, UA 0-2 /HPF      Hyaline Casts, UA 0-2 /LPF      Methodology Automated Microscopy             Imaging:    CT Abdomen Pelvis Without Contrast    Result Date: 12/30/2024  CT ABDOMEN PELVIS WO CONTRAST Date of Exam: 12/30/2024 5:05 PM EST Indication: Abdominal pain/D/V. Comparison: 1/15/2021 Technique: Axial CT images were obtained of the abdomen and pelvis without the administration of contrast. Reconstructed coronal and sagittal images were also obtained. Automated exposure control and iterative construction methods were used. Findings: LUNG BASES: Small right pleural effusion with minimal right basal atelectasis. LIVER:  Unremarkable parenchyma without focal lesion. BILIARY/GALLBLADDER: Cholecystectomy SPLEEN:  Unremarkable PANCREAS:  Unremarkable ADRENAL:  Unremarkable KIDNEYS: There are bilateral water density renal cysts. Arising from the upper pole of the left kidney anteriorly is a 2.1 cm hyperdense cyst versus nodule for which nonemergent follow-up renal MRI or CT with and without contrast is recommended. If patient is not a candidate for  contrast-enhanced MRI or CT then ultrasound might be considered. No calculus identified. GASTROINTESTINAL/MESENTERY:  No evidence of obstruction nor inflammation.  AORTA/IVC:  Normal caliber. RETROPERITONEUM/LYMPH NODES:  Unremarkable REPRODUCTIVE:  Unremarkable BLADDER:  Unremarkable OSSEUS STRUCTURES:  Typical for age with no acute process identified.     Impression: 1.No acute process is identified within the abdomen or pelvis. 2.There is a nonspecific hyperdense cyst versus nodule arising from the anterior upper pole of the left kidney for which nonemergent follow-up dedicated renal mass protocol MRI or CT is recommended as discussed above. 3.Persistent small right pleural effusion. 4.Other incidental nonemergent findings as detailed above. Electronically Signed: Marko Moscoso MD  12/30/2024 5:23 PM EST  Workstation ID: [id number]       Differential Diagnosis and Discussion:    Abdominal Pain: Based on the patient's signs and symptoms, I considered abdominal aortic aneurysm, small bowel obstruction, pancreatitis, acute cholecystitis, acute appendecitis, peptic ulcer disease, gastritis, colitis, endocrine disorders, irritable bowel syndrome and other differential diagnosis an etiology of the patient's abdominal pain.  Diarrhea: Differential diagnosis includes but is not limited to malabsorption syndrome, bacterial infection, carcinoid syndrome, pancreatic hypersecretion, viral infection, celiac sprue, Crohn's disease, ulcerative colitis, ischemic colitis, colitis, hypermotility, and irritable bowel syndrome.  Vomiting: Differential diagnosis includes but is not limited to migraine, labyrinthine disorders, psychogenic, metabolic and endocrine causes, peptic ulcer, gastric outlet obstruction, gastritis, gastroenteritis, appendicitis, intestinal obstruction, paralytic ileus, food poisoning, cholecystitis, acute hepatitis, acute pancreatitis, acute febrile illness, and myocardial infarction.    PROCEDURES:    Labs were  collected in the emergency department and all labs were reviewed and interpreted by me.  CT scan was performed in the emergency department and the CT scan radiology impression was interpreted by me.    No orders to display       Procedures    MDM  Number of Diagnoses or Management Options  Gastroenteritis  Diagnosis management comments: In summary this is an 84-year-old female who presents to the emergency department for evaluation of abdominal pain with nausea, vomiting, diarrhea today.  CBC independently reviewed and interpreted by me and shows no critical abnormalities.  CMP independently reviewed and interpreted by me and shows no critical abnormalities.  CT abdomen pelvis reviewed by me is unremarkable for acute pathology.  Patient will be discharged home with prescription antiemetics.  Very strict return to ER and follow-up instructions have been provided to the patient.                         Patient Care Considerations:    ANTIBIOTICS: I considered prescribing antibiotics as an outpatient however no bacterial focus of infection was found.      Consultants/Shared Management Plan:    None    Social Determinants of Health:    Patient is independent, reliable, and has access to care.       Disposition and Care Coordination:    Discharged: I considered escalation of care by admitting this patient to the hospital, however no acute abdominal pathology identified    I have explained the patient´s condition, diagnoses and treatment plan based on the information available to me at this time. I have answered questions and addressed any concerns. The patient has a good  understanding of the patient´s diagnosis, condition, and treatment plan as can be expected at this point. The vital signs have been stable. The patient´s condition is stable and appropriate for discharge from the emergency department.      The patient will pursue further outpatient evaluation with the primary care physician or other designated or  consulting physician as outlined in the discharge instructions. They are agreeable to this plan of care and follow-up instructions have been explained in detail. The patient has received these instructions in written format and has expressed an understanding of the discharge instructions. The patient is aware that any significant change in condition or worsening of symptoms should prompt an immediate return to this or the closest emergency department or call to 911.  I have explained discharge medications and the need for follow up with the patient/caretakers. This was also printed in the discharge instructions. Patient was discharged with the following medications and follow up:      Medication List        New Prescriptions      ondansetron ODT 4 MG disintegrating tablet  Commonly known as: ZOFRAN-ODT  Place 1 tablet on the tongue Every 8 (Eight) Hours As Needed for Nausea or Vomiting.               Where to Get Your Medications        These medications were sent to [id number] - [place], [place] - [address] AT [facility] [address] - [phone] [facility] [phone]   [address], [place] [address]      Phone: [phone]   ondansetron ODT 4 MG disintegrating tablet      Weston Grayson MD  [address] [id number] [phone]    In 1 week         Final diagnoses:   Gastroenteritis        ED Disposition       ED Disposition   Discharge    Condition   Stable    Comment   --               This medical record created using voice recognition software.             Rogers Roche MD  12/30/24 2960

## 2025-01-13 ENCOUNTER — TELEPHONE (OUTPATIENT)
Dept: CARDIOLOGY | Facility: CLINIC | Age: 85
End: 2025-01-13
Payer: MEDICARE

## 2025-01-13 NOTE — TELEPHONE ENCOUNTER
[facility] received a fax that requires your attention. The document has been indexed to the patient’s chart for your review.      Reason for sending: EXTERNAL MEDICAL RECORD NOTIFICATION     Documents Description: APPROVAL-[facility]-1.13.25    Name of Sender: FLAQUITO KRUEGER     Date Indexed: 1.13.25

## 2025-04-01 ENCOUNTER — TRANSCRIBE ORDERS (OUTPATIENT)
Age: 85
End: 2025-04-01
Payer: MEDICARE

## 2025-04-01 DIAGNOSIS — N18.4 CHRONIC KIDNEY DISEASE, STAGE IV (SEVERE): Primary | ICD-10-CM

## 2025-05-06 ENCOUNTER — HOSPITAL ENCOUNTER (EMERGENCY)
Facility: HOSPITAL | Age: 85
Discharge: HOME | End: 2025-05-06
Attending: EMERGENCY MEDICINE | Admitting: EMERGENCY MEDICINE
Payer: MEDICARE

## 2025-05-06 ENCOUNTER — APPOINTMENT (OUTPATIENT)
Dept: GENERAL RADIOLOGY | Facility: HOSPITAL | Age: 85
End: 2025-05-06
Payer: MEDICARE

## 2025-05-06 VITALS
TEMPERATURE: 98.1 F | SYSTOLIC BLOOD PRESSURE: 131 MMHG | RESPIRATION RATE: 15 BRPM | BODY MASS INDEX: 23.95 KG/M2 | OXYGEN SATURATION: 99 % | HEART RATE: 99 BPM | HEIGHT: 65 IN | WEIGHT: 143.74 LBS | DIASTOLIC BLOOD PRESSURE: 75 MMHG

## 2025-05-06 DIAGNOSIS — R07.9 CHEST PAIN, UNSPECIFIED TYPE: Primary | ICD-10-CM

## 2025-05-06 DIAGNOSIS — M94.0 COSTOCHONDRITIS: ICD-10-CM

## 2025-05-06 LAB
ALBUMIN SERPL-MCNC: 4.7 G/DL (ref 3.5–5.2)
ALBUMIN/GLOB SERPL: 1.7 G/DL
ALP SERPL-CCNC: 128 U/L (ref 39–117)
ALT SERPL W P-5'-P-CCNC: 8 U/L (ref 1–33)
ANION GAP SERPL CALCULATED.3IONS-SCNC: 10.2 MMOL/L (ref 5–15)
AST SERPL-CCNC: 23 U/L (ref 1–32)
BASOPHILS # BLD AUTO: 0.04 10*3/MM3 (ref 0–0.2)
BASOPHILS NFR BLD AUTO: 0.6 % (ref 0–1.5)
BILIRUB SERPL-MCNC: 1.2 MG/DL (ref 0–1.2)
BUN SERPL-MCNC: 39 MG/DL (ref 8–23)
BUN/CREAT SERPL: 18.9 (ref 7–25)
CALCIUM SPEC-SCNC: 9.3 MG/DL (ref 8.6–10.5)
CHLORIDE SERPL-SCNC: 102 MMOL/L (ref 98–107)
CO2 SERPL-SCNC: 25.8 MMOL/L (ref 22–29)
CREAT SERPL-MCNC: 2.06 MG/DL (ref 0.57–1)
DEPRECATED RDW RBC AUTO: 43.8 FL (ref 37–54)
EGFRCR SERPLBLD CKD-EPI 2021: 23.1 ML/MIN/1.73
EOSINOPHIL # BLD AUTO: 0.29 10*3/MM3 (ref 0–0.4)
EOSINOPHIL NFR BLD AUTO: 4.3 % (ref 0.3–6.2)
ERYTHROCYTE [DISTWIDTH] IN BLOOD BY AUTOMATED COUNT: 14.1 % (ref 12.3–15.4)
GEN 5 1HR TROPONIN T REFLEX: 30 NG/L
GLOBULIN UR ELPH-MCNC: 2.8 GM/DL
GLUCOSE SERPL-MCNC: 101 MG/DL (ref 65–99)
HCT VFR BLD AUTO: 31.3 % (ref 34–46.6)
HGB BLD-MCNC: 11.8 G/DL (ref 12–15.9)
HOLD SPECIMEN: NORMAL
HOLD SPECIMEN: NORMAL
IMM GRANULOCYTES # BLD AUTO: 0.02 10*3/MM3 (ref 0–0.05)
IMM GRANULOCYTES NFR BLD AUTO: 0.3 % (ref 0–0.5)
LIPASE SERPL-CCNC: 59 U/L (ref 13–60)
LYMPHOCYTES # BLD AUTO: 2.39 10*3/MM3 (ref 0.7–3.1)
LYMPHOCYTES NFR BLD AUTO: 35.4 % (ref 19.6–45.3)
MAGNESIUM SERPL-MCNC: 2.1 MG/DL (ref 1.6–2.4)
MCH RBC QN AUTO: 38.8 PG (ref 26.6–33)
MCHC RBC AUTO-ENTMCNC: 37.7 G/DL (ref 31.5–35.7)
MCV RBC AUTO: 103 FL (ref 79–97)
MONOCYTES # BLD AUTO: 0.72 10*3/MM3 (ref 0.1–0.9)
MONOCYTES NFR BLD AUTO: 10.7 % (ref 5–12)
NEUTROPHILS NFR BLD AUTO: 3.29 10*3/MM3 (ref 1.7–7)
NEUTROPHILS NFR BLD AUTO: 48.7 % (ref 42.7–76)
NRBC BLD AUTO-RTO: 0 /100 WBC (ref 0–0.2)
NT-PROBNP SERPL-MCNC: 6493 PG/ML (ref 0–1800)
PLATELET # BLD AUTO: 180 10*3/MM3 (ref 140–450)
PMV BLD AUTO: 10.2 FL (ref 6–12)
POTASSIUM SERPL-SCNC: 5.5 MMOL/L (ref 3.5–5.2)
PROT SERPL-MCNC: 7.5 G/DL (ref 6–8.5)
QT INTERVAL: 430 MS
QTC INTERVAL: 504 MS
RBC # BLD AUTO: 3.04 10*6/MM3 (ref 3.77–5.28)
SODIUM SERPL-SCNC: 138 MMOL/L (ref 136–145)
TROPONIN T % DELTA: -6
TROPONIN T NUMERIC DELTA: -2 NG/L
TROPONIN T SERPL HS-MCNC: 32 NG/L
WBC NRBC COR # BLD AUTO: 6.75 10*3/MM3 (ref 3.4–10.8)
WHOLE BLOOD HOLD COAG: NORMAL
WHOLE BLOOD HOLD SPECIMEN: NORMAL

## 2025-05-06 PROCEDURE — 36415 COLL VENOUS BLD VENIPUNCTURE: CPT

## 2025-05-06 PROCEDURE — 85025 COMPLETE CBC W/AUTO DIFF WBC: CPT | Performed by: EMERGENCY MEDICINE

## 2025-05-06 PROCEDURE — 83690 ASSAY OF LIPASE: CPT

## 2025-05-06 PROCEDURE — 71045 X-RAY EXAM CHEST 1 VIEW: CPT

## 2025-05-06 PROCEDURE — 99284 EMERGENCY DEPT VISIT MOD MDM: CPT

## 2025-05-06 PROCEDURE — 84484 ASSAY OF TROPONIN QUANT: CPT | Performed by: EMERGENCY MEDICINE

## 2025-05-06 PROCEDURE — 93005 ELECTROCARDIOGRAM TRACING: CPT

## 2025-05-06 PROCEDURE — 84484 ASSAY OF TROPONIN QUANT: CPT

## 2025-05-06 PROCEDURE — 83880 ASSAY OF NATRIURETIC PEPTIDE: CPT

## 2025-05-06 PROCEDURE — 93005 ELECTROCARDIOGRAM TRACING: CPT | Performed by: EMERGENCY MEDICINE

## 2025-05-06 PROCEDURE — 83735 ASSAY OF MAGNESIUM: CPT

## 2025-05-06 PROCEDURE — 80053 COMPREHEN METABOLIC PANEL: CPT

## 2025-05-06 RX ORDER — ASPIRIN 81 MG/1
324 TABLET, CHEWABLE ORAL ONCE
Status: DISCONTINUED | OUTPATIENT
Start: 2025-05-06 | End: 2025-05-06 | Stop reason: HOSPADM

## 2025-05-06 RX ORDER — ORPHENADRINE CITRATE 30 MG/ML
60 INJECTION INTRAMUSCULAR; INTRAVENOUS ONCE
Status: DISCONTINUED | OUTPATIENT
Start: 2025-05-06 | End: 2025-05-06 | Stop reason: HOSPADM

## 2025-05-06 RX ORDER — SODIUM CHLORIDE 0.9 % (FLUSH) 0.9 %
10 SYRINGE (ML) INJECTION AS NEEDED
Status: DISCONTINUED | OUTPATIENT
Start: 2025-05-06 | End: 2025-05-06 | Stop reason: HOSPADM

## 2025-05-06 NOTE — ED PROVIDER NOTES
Time: 6:49 PM EDT  Date of encounter:  5/6/2025  Independent Historian/Clinical History and Information was obtained by:   Patient    History is limited by: N/A    Chief Complaint: Chest pain      History of Present Illness:  Patient is a 85 y.o. year old female who presents to the emergency department for evaluation of chest pain that is gotten worse over the past day.  Patient denies shortness of breath.  Patient has no nausea or vomiting.  Patient has no cough.  Patient has no dysuria or urinary frequency.  Patient has no leg pain or swelling.      Patient Care Team  Primary Care Provider: Weston Grayson MD    Past Medical History:     Allergies   Allergen Reactions    Nitrofurantoin GI Intolerance    Sulfa Antibiotics Itching    Latex Rash     Past Medical History:   Diagnosis Date    Anemia     GERD (gastroesophageal reflux disease)     Heart disease     Hyperlipidemia     Hypertension     Kidney disease     Restless leg      Past Surgical History:   Procedure Laterality Date    APPENDECTOMY      CARDIAC CATHETERIZATION N/A 1/7/2022    Procedure: Left Heart Cath;  Surgeon: Hitesh Mcguire MD;  Location: [facility] INVASIVE LOCATION;  Service: Cardiovascular;  Laterality: N/A;    CARDIAC CATHETERIZATION N/A 1/7/2022    Procedure: Possible Percutaneous Coronary Intervention;  Surgeon: Hitesh Mcguire MD;  Location: [facility] INVASIVE LOCATION;  Service: Cardiovascular;  Laterality: N/A;    CARDIAC SURGERY      CHOLECYSTECTOMY       History reviewed. No pertinent family history.    Home Medications:  Prior to Admission medications    Medication Sig Start Date End Date Taking? Authorizing Provider   ALPRAZolam (XANAX) 1 MG tablet Take 1 tablet by mouth Every 12 (Twelve) Hours. 3/25/23   Paresh Dela Cruz MD   amLODIPine (NORVASC) 5 MG tablet Take 1 tablet by mouth Daily. 7/26/24 7/26/25  ProviderYon MD   apixaban (Eliquis) 2.5 MG tablet tablet Take 1 tablet by mouth Every 12  (Twelve) Hours. 11/12/24   Rodrigo Carr MD   atenolol (TENORMIN) 50 MG tablet Take 1 tablet by mouth Daily. 11/12/24   Rodrigo Carr MD   cholecalciferol (VITAMIN D3) 1.25 MG (39618 UT) capsule Take 1 capsule by mouth Every 7 (Seven) Days.    Yon Vogt MD   folic acid (FOLVITE) 1 MG tablet Take 1 tablet by mouth Daily.    Yon Vogt MD   furosemide (LASIX) 20 MG tablet Take 1 tablet by mouth Daily. 8/6/24 8/6/25  Yon Vogt MD   HYDROcodone-acetaminophen (NORCO) 7.5-325 MG per tablet Take 1 tablet by mouth Every 6 (Six) Hours As Needed for Moderate Pain.    Yon Vogt MD   naloxone (Narcan) 4 MG/0.1ML nasal spray one spray in one nostril as needed for over sedation or respiratory depression from opioid use. Repeat one spray in alternate nostril every 2-3 minutes until responsive or EMS arrives 10/31/23   Yon Vogt MD   ondansetron ODT (ZOFRAN-ODT) 4 MG disintegrating tablet Place 1 tablet on the tongue Every 8 (Eight) Hours As Needed for Nausea or Vomiting. 12/30/24   Rogers Roche MD   pantoprazole (PROTONIX) 40 MG EC tablet Take 1 tablet by mouth Daily.    Yon Vogt MD   potassium chloride (KLOR-CON M20) 20 MEQ CR tablet Take 0.5 tablets by mouth Daily. 8/6/24 8/6/25  Yon Vogt MD   rOPINIRole (REQUIP) 1 MG tablet Take 1 tablet by mouth Every Night.    Yon Vogt MD   simvastatin (ZOCOR) 40 MG tablet Take 1 tablet by mouth Every Night. 11/12/24   Rodrigo Carr MD        Social History:   Social History     Tobacco Use    Smoking status: Never    Smokeless tobacco: Former     Types: Chew   Vaping Use    Vaping status: Never Used   Substance Use Topics    Alcohol use: Never    Drug use: Never         Review of Systems:  Review of Systems   Constitutional:  Negative for chills and fever.   HENT:  Negative for congestion, rhinorrhea and sore throat.    Eyes:  Negative for pain and visual disturbance.  "  Respiratory:  Negative for apnea, cough, chest tightness and shortness of breath.    Cardiovascular:  Positive for chest pain. Negative for palpitations.   Gastrointestinal:  Negative for abdominal pain, diarrhea, nausea and vomiting.   Genitourinary:  Negative for difficulty urinating and dysuria.   Musculoskeletal:  Negative for joint swelling and myalgias.   Skin:  Negative for color change.   Neurological:  Negative for seizures and headaches.   Psychiatric/Behavioral: Negative.     All other systems reviewed and are negative.       Physical Exam:  /57   Pulse 84   Temp 98 °F (36.7 °C)   Resp 19   Ht 165.1 cm (65\")   Wt 65.2 kg (143 lb 11.8 oz)   SpO2 100%   BMI 23.92 kg/m²     Physical Exam  Vitals and nursing note reviewed.   Constitutional:       General: She is not in acute distress.     Appearance: Normal appearance. She is not toxic-appearing.   HENT:      Head: Normocephalic and atraumatic.      Jaw: There is normal jaw occlusion.   Eyes:      General: Lids are normal.      Extraocular Movements: Extraocular movements intact.      Conjunctiva/sclera: Conjunctivae normal.      Pupils: Pupils are equal, round, and reactive to light.   Cardiovascular:      Rate and Rhythm: Normal rate and regular rhythm.      Pulses: Normal pulses.      Heart sounds: Normal heart sounds.   Pulmonary:      Effort: Pulmonary effort is normal. No respiratory distress.      Breath sounds: Normal breath sounds. No wheezing or rhonchi.   Chest:      Chest wall: Tenderness present.   Abdominal:      General: Abdomen is flat.      Palpations: Abdomen is soft.      Tenderness: There is no abdominal tenderness. There is no guarding or rebound.   Musculoskeletal:         General: Normal range of motion.      Cervical back: Normal range of motion and neck supple.      Right lower leg: No edema.      Left lower leg: No edema.   Skin:     General: Skin is warm and dry.   Neurological:      Mental Status: She is alert and " oriented to person, place, and time. Mental status is at baseline.   Psychiatric:         Mood and Affect: Mood normal.                    Medical Decision Making:      Comorbidities that affect care:    Hypertension    External Notes reviewed:    Previous ED Note: Patient was last seen in the ED for diarrhea.      The following orders were placed and all results were independently analyzed by me:  Orders Placed This Encounter   Procedures    XR Chest 1 View    [place] Draw    High Sensitivity Troponin T    Comprehensive Metabolic Panel    Lipase    BNP    Magnesium    CBC Auto Differential    High Sensitivity Troponin T 1Hr    NPO Diet NPO Type: Strict NPO    Undress & Gown    Continuous Pulse Oximetry    Oxygen Therapy- Nasal Cannula; Titrate 1-6 LPM Per SpO2; 90 - 95%    ECG 12 Lead ED Triage Standing Order; Chest Pain    ECG 12 Lead ED Triage Standing Order; Chest Pain    Insert Peripheral IV    CBC & Differential    Green Top (Gel)    Lavender Top    Gold Top - SST    Light Blue Top       Medications Given in the Emergency Department:  Medications   sodium chloride 0.9 % flush 10 mL (has no administration in time range)   aspirin chewable tablet 324 mg (324 mg Oral Not Given 5/6/25 1554)   orphenadrine (NORFLEX) injection 60 mg (60 mg Intravenous Not Given 5/6/25 1842)        ED Course:         Labs:    Lab Results (last 24 hours)       Procedure Component Value Units Date/Time    High Sensitivity Troponin T [[id number]]  (Abnormal) Collected: 05/06/25 1456    Specimen: Blood from Arm, Right Updated: 05/06/25 1532     HS Troponin T 32 ng/L     Narrative:      High Sensitive Troponin T Reference Range:  <14.0 ng/L- Negative Female for AMI  <22.0 ng/L- Negative Male for AMI  >=14 - Abnormal Female indicating possible myocardial injury.  >=22 - Abnormal Male indicating possible myocardial injury.   Clinicians would have to utilize clinical acumen, EKG, Troponin, and serial changes to determine if it is an Acute  Myocardial Infarction or myocardial injury due to an underlying chronic condition.         CBC & Differential [[id number]]  (Abnormal) Collected: 05/06/25 1456    Specimen: Blood from Arm, Right Updated: 05/06/25 0469    Narrative:      The following orders were created for panel order CBC & Differential.  Procedure                               Abnormality         Status                     ---------                               -----------         ------                     CBC Auto Differential[[id number]]        Abnormal            Final result               Scan Slide[[id number]]                                                                    Please view results for these tests on the individual orders.    Comprehensive Metabolic Panel [[id number]]  (Abnormal) Collected: 05/06/25 1456    Specimen: Blood from Arm, Right Updated: 05/06/25 1532     Glucose 101 mg/dL      BUN 39 mg/dL      Creatinine 2.06 mg/dL      Sodium 138 mmol/L      Potassium 5.5 mmol/L      Comment: Slight hemolysis detected by analyzer. Result may be falsely elevated.        Chloride 102 mmol/L      CO2 25.8 mmol/L      Calcium 9.3 mg/dL      Total Protein 7.5 g/dL      Albumin 4.7 g/dL      ALT (SGPT) 8 U/L      AST (SGOT) 23 U/L      Alkaline Phosphatase 128 U/L      Total Bilirubin 1.2 mg/dL      Globulin 2.8 gm/dL      A/G Ratio 1.7 g/dL      BUN/Creatinine Ratio 18.9     Anion Gap 10.2 mmol/L      eGFR 23.1 mL/min/1.73     Narrative:      GFR Categories in Chronic Kidney Disease (CKD)              GFR Category          GFR (mL/min/1.73)    Interpretation  G1                    90 or greater        Normal or high (1)  G2                    60-89                Mild decrease (1)  G3a                   45-59                Mild to moderate decrease  G3b                   30-44                Moderate to severe decrease  G4                    15-29                Severe decrease  G5                    14 or less           Kidney  failure    (1)In the absence of evidence of kidney disease, neither GFR category G1 or G2 fulfill the criteria for CKD.    eGFR calculation 2021 CKD-EPI creatinine equation, which does not include race as a factor    Lipase [[id number]]  (Normal) Collected: 05/06/25 1456    Specimen: Blood from Arm, Right Updated: 05/06/25 1532     Lipase 59 U/L     BNP [[id number]]  (Abnormal) Collected: 05/06/25 1456    Specimen: Blood from Arm, Right Updated: 05/06/25 1528     proBNP 6,493.0 pg/mL     Narrative:      This assay is used as an aid in the diagnosis of individuals suspected of having heart failure. It can be used as an aid in the diagnosis of acute decompensated heart failure (ADHF) in patients presenting with signs and symptoms of ADHF to the emergency department (ED). In addition, NT-proBNP of <300 pg/mL indicates ADHF is not likely.    Age Range Result Interpretation  NT-proBNP Concentration (pg/mL:      <50             Positive            >450                   Gray                 300-450                    Negative             <300    50-75           Positive            >900                  Gray                300-900                  Negative            <300      >75             Positive            >1800                  Gray                300-1800                  Negative            <300    Magnesium [[id number]]  (Normal) Collected: 05/06/25 1456    Specimen: Blood from Arm, Right Updated: 05/06/25 1532     Magnesium 2.1 mg/dL     CBC Auto Differential [[id number]]  (Abnormal) Collected: 05/06/25 1456    Specimen: Blood from Arm, Right Updated: 05/06/25 1608     WBC 6.75 10*3/mm3      RBC 3.04 10*6/mm3      Hemoglobin 11.8 g/dL      Hematocrit 31.3 %      .0 fL      MCH 38.8 pg      MCHC 37.7 g/dL      RDW 14.1 %      RDW-SD 43.8 fl      MPV 10.2 fL      Platelets 180 10*3/mm3      Neutrophil % 48.7 %      Lymphocyte % 35.4 %      Monocyte % 10.7 %      Eosinophil % 4.3 %      Basophil % 0.6 %       Immature Grans % 0.3 %      Neutrophils, Absolute 3.29 10*3/mm3      Lymphocytes, Absolute 2.39 10*3/mm3      Monocytes, Absolute 0.72 10*3/mm3      Eosinophils, Absolute 0.29 10*3/mm3      Basophils, Absolute 0.04 10*3/mm3      Immature Grans, Absolute 0.02 10*3/mm3      nRBC 0.0 /100 WBC     High Sensitivity Troponin T 1Hr [[id number]]  (Abnormal) Collected: 05/06/25 1624    Specimen: Blood from Arm, Right Updated: 05/06/25 1649     HS Troponin T 30 ng/L      Troponin T Numeric Delta -2 ng/L      Troponin T % Delta -6    Narrative:      High Sensitive Troponin T Reference Range:  <14.0 ng/L- Negative Female for AMI  <22.0 ng/L- Negative Male for AMI  >=14 - Abnormal Female indicating possible myocardial injury.  >=22 - Abnormal Male indicating possible myocardial injury.   Clinicians would have to utilize clinical acumen, EKG, Troponin, and serial changes to determine if it is an Acute Myocardial Infarction or myocardial injury due to an underlying chronic condition.                  Imaging:    XR Chest 1 View  Result Date: 5/6/2025  XR CHEST 1 VW Date of Exam: 5/6/2025 3:00 PM EDT Indication: Chest Pain Triage Protocol Comparison: 8/17/2024 Findings: Cardiomediastinal silhouette is unremarkable.  No airspace disease, pneumothorax, nor pleural effusion. No acute osseous abnormality identified.     Impression: No active disease. Electronically Signed: Marko Moscoso MD  5/6/2025 3:12 PM EDT  Workstation ID: [id number]        Differential Diagnosis and Discussion:    Chest Pain:  Based on the patient's signs and symptoms, I considered aortic dissection, myocardial infaction, pulmonary embolism, cardiac tamponade, pericarditis, pneumothorax, musculoskeletal chest pain and other differential diagnosis as an etiology of the patient's chest pain.     PROCEDURES:    Labs were collected in the emergency department and all labs were reviewed and interpreted by me.  X-ray were performed in the emergency department and all  X-ray impressions were independently interpreted by me.  An EKG was performed and the EKG was interpreted by me.    ECG 12 Lead ED Triage Standing Order; Chest Pain   Preliminary Result   HEART RATE=82  bpm   RR Gbxrffnl=421  ms   TX Interval=  ms   P Horizontal Axis=  deg   P Front Axis=  deg   QRSD Lhrfvvol=233  ms   QT Eozzjkbf=426  ms   IZrB=361  ms   QRS Axis=-51  deg   T Wave Axis=64  deg   - ABNORMAL ECG -   Atrial fibrillation   Nonspecific IVCD with LAD   Anterior infarct, old   Prolonged QT interval   Date and Time of Study:2025-05-06 14:44:53          Procedures    MDM     The patient´s CBC that was reviewed and interpreted by me shows no abnormalities of critical concern. Of note, there is no anemia requiring a blood transfusion and the platelet count is acceptable.  CMP shows a elevated BUN and creatinine, however this has been chronically elevated.  Troponin is 32 and repeat is 30.  BNP is 6493.  Chest x-ray shows no pleural effusions or congestion.  Patient has no respiratory distress or hypoxia.  Patient does report tenderness with palpation and states that this is the pain that she is feeling.                Patient Care Considerations:    PERC: I used the PERC score to risk stratify the patient for PE and a CT of the chest was considered but ultimately not indicated in today's visit.      Consultants/Shared Management Plan:    None    Social Determinants of Health:    Patient is independent, reliable, and has access to care.       Disposition and Care Coordination:    Discharged: I considered escalation of care by admitting this patient to the hospital, however patient is well-appearing and stable and suitable for discharge.    I have explained the patient´s condition, diagnoses and treatment plan based on the information available to me at this time. I have answered questions and addressed any concerns. The patient has a good  understanding of the patient´s diagnosis, condition, and treatment plan  as can be expected at this point. The vital signs have been stable. The patient´s condition is stable and appropriate for discharge from the emergency department.      The patient will pursue further outpatient evaluation with the primary care physician or other designated or consulting physician as outlined in the discharge instructions. They are agreeable to this plan of care and follow-up instructions have been explained in detail. The patient has received these instructions in written format and has expressed an understanding of the discharge instructions. The patient is aware that any significant change in condition or worsening of symptoms should prompt an immediate return to this or the closest emergency department or call to 911.  I have explained discharge medications and the need for follow up with the patient/caretakers. This was also printed in the discharge instructions. Patient was discharged with the following medications and follow up:      Medication List      No changes were made to your prescriptions during this visit.      Weston Grayson MD  [address] [place] [address] [id number] [phone]    In 2 days         Final diagnoses:   Chest pain, unspecified type   Costochondritis        ED Disposition       ED Disposition   Discharge    Condition   Stable    Comment   --               This medical record created using voice recognition software.             Luna Kohler MD  05/06/25 2832

## 2025-05-15 NOTE — PROGRESS NOTES
[facility]  Cardiology progress Note    Patient Name: Rupal Buchanan  : 1940    CHIEF COMPLAINT  A-fib        Subjective   Subjective     HISTORY OF PRESENT ILLNESS    Rupal Buchanan is a 85 y.o. female with history of A-fib.    REVIEW OF SYSTEMS    Constitutional:    No fever, no weight loss  Skin:     No rash  Otolaryngeal:    No difficulty swallowing  Cardiovascular: See HPI.  Pulmonary:    No cough, no sputum production    Personal History     Social History:    reports that she has never smoked. She has quit using smokeless tobacco.  Her smokeless tobacco use included chew. She reports that she does not drink alcohol and does not use drugs.    Home Medications:  Current Outpatient Medications on File Prior to Visit   Medication Sig    ALPRAZolam (XANAX) 1 MG tablet Take 1 tablet by mouth Every 12 (Twelve) Hours.    amLODIPine (NORVASC) 5 MG tablet Take 1 tablet by mouth Daily.    apixaban (Eliquis) 2.5 MG tablet tablet Take 1 tablet by mouth Every 12 (Twelve) Hours.    atenolol (TENORMIN) 50 MG tablet Take 1 tablet by mouth Daily.    cholecalciferol (VITAMIN D3) 1.25 MG (32826 UT) capsule Take 1 capsule by mouth Every 7 (Seven) Days.    folic acid (FOLVITE) 1 MG tablet Take 1 tablet by mouth Daily.    furosemide (LASIX) 20 MG tablet Take 1 tablet by mouth Daily.    HYDROcodone-acetaminophen (NORCO) 7.5-325 MG per tablet Take 1 tablet by mouth Every 6 (Six) Hours As Needed for Moderate Pain.    naloxone (Narcan) 4 MG/0.1ML nasal spray one spray in one nostril as needed for over sedation or respiratory depression from opioid use. Repeat one spray in alternate nostril every 2-3 minutes until responsive or EMS arrives    ondansetron ODT (ZOFRAN-ODT) 4 MG disintegrating tablet Place 1 tablet on the tongue Every 8 (Eight) Hours As Needed for Nausea or Vomiting.    pantoprazole (PROTONIX) 40 MG EC tablet Take 1 tablet by mouth Daily.    potassium chloride (KLOR-CON M20) 20 MEQ CR tablet Take  0.5 tablets by mouth Daily.    rOPINIRole (REQUIP) 1 MG tablet Take 1 tablet by mouth Every Night.    simvastatin (ZOCOR) 40 MG tablet Take 1 tablet by mouth Every Night.     No current facility-administered medications on file prior to visit.       Past Medical History:   Diagnosis Date    Anemia     GERD (gastroesophageal reflux disease)     Heart disease     Hyperlipidemia     Hypertension     Kidney disease     Restless leg        Allergies:  Allergies   Allergen Reactions    Nitrofurantoin GI Intolerance    Sulfa Antibiotics Itching    Latex Rash       Objective    Objective       Vitals:   Heart Rate:  [71] 71  BP: (122)/(73) 122/73  Body mass index is 23.16 kg/m².     PHYSICAL EXAM:    General Appearance:   well developed  well nourished  HENT:   oropharynx moist  lips not cyanotic  Neck:  thyroid not enlarged  supple  Respiratory:  no respiratory distress  normal breath sounds  no rales  Cardiovascular:  no jugular venous distention  regular rhythm  apical impulse normal  S1 normal, S2 normal  no S3, no S4   no murmur  no rub, no thrill  carotid pulses normal; no bruit  pedal pulses normal  lower extremity edema: none    Skin:   warm, dry  Psychiatric:  judgement and insight appropriate  normal mood and affect        Result Review:  I have personally reviewed the available results from  [x]  Laboratory  [x]  EKG  [x]  Cardiology  [x]  Medications  [x]  Old records  []  Other:     Procedures  Lab Results   Component Value Date    CHOL 135 01/03/2022     Lab Results   Component Value Date    TRIG 97 01/03/2022     Lab Results   Component Value Date    HDL 63 (H) 01/03/2022     Lab Results   Component Value Date    LDL 54 01/03/2022     Lab Results   Component Value Date    VLDL 18 01/03/2022     Results for orders placed during the hospital encounter of 01/02/22    Adult Transthoracic Echo Complete W/ Cont if Necessary Per Protocol    Interpretation Summary  · Calculated left ventricular EF = 53.6% Estimated  left ventricular EF was in agreement with the calculated left ventricular EF. Left ventricular systolic function is normal.  · Moderate mitral valve regurgitation is present.  · The aortic valve exhibits sclerosis. Mild aortic valve regurgitation is present.  · The left atrial cavity is mild to moderately dilated     Impression/Plan:  1. Permanent atrial fibrillation with controlled heart rate: Continue Eliquis 2.5 mg twice a day for stroke prevention.  Continue atenolol 50 mg once a day for rate control.  Echocardiogram shows normal left ventricular systolic function.  She has chronic kidney disease.  2.  Moderate mitral regurgitation: Asymptomatic.  3.  Mixed hyperlipidemia: Continue simvastatin 40 mg once a day.  Monitor lipid and hepatic profile.  4.  Essential hypertension controlled: Blood pressure is controlled at home.  Continue amlodipine 5 mg once a day.                 Rodrigo Carr MD   05/20/25   14:45 EDT

## 2025-05-20 ENCOUNTER — OFFICE VISIT (OUTPATIENT)
Dept: CARDIOLOGY | Facility: CLINIC | Age: 85
End: 2025-05-20
Payer: MEDICARE

## 2025-05-20 VITALS
BODY MASS INDEX: 23.19 KG/M2 | WEIGHT: 139.2 LBS | DIASTOLIC BLOOD PRESSURE: 73 MMHG | HEART RATE: 71 BPM | HEIGHT: 65 IN | SYSTOLIC BLOOD PRESSURE: 122 MMHG

## 2025-05-20 DIAGNOSIS — I34.0 NONRHEUMATIC MITRAL VALVE REGURGITATION: ICD-10-CM

## 2025-05-20 DIAGNOSIS — I10 HYPERTENSION, ESSENTIAL: ICD-10-CM

## 2025-05-20 DIAGNOSIS — I48.21 PERMANENT ATRIAL FIBRILLATION: Primary | ICD-10-CM

## 2025-05-20 DIAGNOSIS — E78.2 HYPERLIPEMIA, MIXED: ICD-10-CM

## 2025-05-20 PROCEDURE — 99214 OFFICE O/P EST MOD 30 MIN: CPT | Performed by: SPECIALIST

## 2025-06-09 ENCOUNTER — PATIENT EDUCATION (SURGERY INSTRUCTIONS) (OUTPATIENT)
Age: 85
End: 2025-06-09
Payer: MEDICARE

## 2025-06-09 ENCOUNTER — OFFICE VISIT (OUTPATIENT)
Age: 85
End: 2025-06-09
Payer: MEDICARE

## 2025-06-09 ENCOUNTER — HOSPITAL ENCOUNTER (OUTPATIENT)
Dept: CARDIOLOGY | Facility: HOSPITAL | Age: 85
Discharge: HOME | End: 2025-06-09
Admitting: INTERNAL MEDICINE
Payer: MEDICARE

## 2025-06-09 VITALS
HEIGHT: 65 IN | BODY MASS INDEX: 23.16 KG/M2 | WEIGHT: 139 LBS | DIASTOLIC BLOOD PRESSURE: 65 MMHG | RESPIRATION RATE: 18 BRPM | OXYGEN SATURATION: 96 % | SYSTOLIC BLOOD PRESSURE: 139 MMHG | TEMPERATURE: 97.9 F | HEART RATE: 82 BPM

## 2025-06-09 DIAGNOSIS — N18.4 CKD (CHRONIC KIDNEY DISEASE) STAGE 4, GFR 15-29 ML/MIN: Primary | ICD-10-CM

## 2025-06-09 DIAGNOSIS — N18.4 CHRONIC KIDNEY DISEASE, STAGE IV (SEVERE): ICD-10-CM

## 2025-06-09 LAB
BH CV UPPER VENOUS LEFT BASILIC FOREARM COMPRESS: NORMAL
BH CV UPPER VENOUS LEFT BASILIC UPPER COMPRESS: NORMAL
BH CV UPPER VENOUS LEFT CEPHALIC FOREARM COMPRESS: NORMAL
BH CV UPPER VENOUS LEFT CEPHALIC UPPER COMPRESS: NORMAL
BH CV UPPER VENOUS RIGHT BASILIC FOREARM COMPRESS: NORMAL
BH CV UPPER VENOUS RIGHT BASILIC UPPER COMPRESS: NORMAL
BH CV UPPER VENOUS RIGHT CEPHALIC FOREARM COMPRESS: NORMAL
BH CV UPPER VENOUS RIGHT CEPHALIC UPPER COMPRESS: NORMAL
BH CV VAS MEAS BASILIC ANTECUBITAL FOSSA LEFT: 0.14 CM
BH CV VAS MEAS BASILIC ANTECUBITAL FOSSA RIGHT: 0.23 CM
BH CV VAS MEAS BASILIC FOREARM LEFT - DIST: 0.03 CM
BH CV VAS MEAS BASILIC FOREARM LEFT - MID: 0.07 CM
BH CV VAS MEAS BASILIC FOREARM LEFT - PROX: 0.07 CM
BH CV VAS MEAS BASILIC FOREARM RIGHT - DIST: 0.15 CM
BH CV VAS MEAS BASILIC FOREARM RIGHT - MID: 0.18 CM
BH CV VAS MEAS BASILIC FOREARM RIGHT - PROX: 0.32 CM
BH CV VAS MEAS BASILIC UPPER ARM LEFT - DIST: 0.25 CM
BH CV VAS MEAS BASILIC UPPER ARM LEFT - MID: 0.19 CM
BH CV VAS MEAS BASILIC UPPER ARM LEFT - PROX: 0.27 CM
BH CV VAS MEAS BASILIC UPPER ARM RIGHT - DIST: 0.35 CM
BH CV VAS MEAS BASILIC UPPER ARM RIGHT - MID: 0.36 CM
BH CV VAS MEAS BASILIC UPPER ARM RIGHT - PROX: 0.33 CM
BH CV VAS MEAS CEPHALIC ANTECUBITAL FOSSA LEFT: 0.14 CM
BH CV VAS MEAS CEPHALIC ANTECUBITAL FOSSA RIGHT: 0.38 CM
BH CV VAS MEAS CEPHALIC FOREARM LEFT - DIST: 0.17 CM
BH CV VAS MEAS CEPHALIC FOREARM LEFT - MID: 0.16 CM
BH CV VAS MEAS CEPHALIC FOREARM LEFT - PROX: 0.14 CM
BH CV VAS MEAS CEPHALIC FOREARM RIGHT - DIST: 0.19 CM
BH CV VAS MEAS CEPHALIC FOREARM RIGHT - MID: 0.2 CM
BH CV VAS MEAS CEPHALIC FOREARM RIGHT - PROX: 0.21 CM
BH CV VAS MEAS CEPHALIC UPPER ARM LEFT - DIST: 0.14 CM
BH CV VAS MEAS CEPHALIC UPPER ARM LEFT - MID: 0.23 CM
BH CV VAS MEAS CEPHALIC UPPER ARM LEFT - PROX: 0.26 CM
BH CV VAS MEAS CEPHALIC UPPER ARM RIGHT - DIST: 0.32 CM
BH CV VAS MEAS CEPHALIC UPPER ARM RIGHT - MID: 0.32 CM
BH CV VAS MEAS CEPHALIC UPPER ARM RIGHT - PROX: 0.27 CM

## 2025-06-09 PROCEDURE — 99203 OFFICE O/P NEW LOW 30 MIN: CPT | Performed by: SURGERY

## 2025-06-09 PROCEDURE — 93985 DUP-SCAN HEMO COMPL BI STD: CPT | Performed by: SURGERY

## 2025-06-09 PROCEDURE — 1160F RVW MEDS BY RX/DR IN RCRD: CPT | Performed by: SURGERY

## 2025-06-09 PROCEDURE — 1159F MED LIST DOCD IN RCRD: CPT | Performed by: SURGERY

## 2025-06-09 PROCEDURE — 93985 DUP-SCAN HEMO COMPL BI STD: CPT

## 2025-06-09 NOTE — H&P (VIEW-ONLY)
[facility]   HISTORY AND PHYSICAL    Patient Name: Rupal Buchanan  : 1940  MRN: [id number]  Primary Care Physician:  Weston Grayson MD  Date of admission: (Not on file)    Subjective   Subjective     Chief Complaint: Need for permanent access for hemodialysis    HPI:    Rupal Buchanan is a 85 y.o. female getting ready for possible dialysis.  She is in need for permanent access.  Right-handed.    Review of Systems    Non contributory except for the History of Present Illness    Personal History     Past Medical History:   Diagnosis Date    Anemia     GERD (gastroesophageal reflux disease)     Heart disease     Hyperlipidemia     Hypertension     Kidney disease     Restless leg        Past Surgical History:   Procedure Laterality Date    APPENDECTOMY      CARDIAC CATHETERIZATION N/A 2022    Procedure: Left Heart Cath;  Surgeon: Hitesh Mcguire MD;  Location: [facility] INVASIVE LOCATION;  Service: Cardiovascular;  Laterality: N/A;    CARDIAC CATHETERIZATION N/A 2022    Procedure: Possible Percutaneous Coronary Intervention;  Surgeon: Hitesh Mcguire MD;  Location: [facility] INVASIVE LOCATION;  Service: Cardiovascular;  Laterality: N/A;    CARDIAC SURGERY      CHOLECYSTECTOMY         Family History: family history is not on file. Otherwise pertinent FHx was reviewed and not pertinent to current issue.    Social History:  reports that she has never smoked. She has quit using smokeless tobacco.  Her smokeless tobacco use included chew. She reports that she does not drink alcohol and does not use drugs.    Home Medications:  Current Outpatient Medications on File Prior to Visit   Medication Sig    ALPRAZolam (XANAX) 1 MG tablet Take 1 tablet by mouth Every 12 (Twelve) Hours.    amLODIPine (NORVASC) 5 MG tablet Take 1 tablet by mouth Daily.    apixaban (Eliquis) 2.5 MG tablet tablet Take 1 tablet by mouth Every 12 (Twelve) Hours.    atenolol (TENORMIN) 50 MG tablet Take 1  tablet by mouth Daily.    cholecalciferol (VITAMIN D3) 1.25 MG (39324 UT) capsule Take 1 capsule by mouth Every 7 (Seven) Days.    folic acid (FOLVITE) 1 MG tablet Take 1 tablet by mouth Daily.    furosemide (LASIX) 20 MG tablet Take 1 tablet by mouth Daily.    HYDROcodone-acetaminophen (NORCO) 7.5-325 MG per tablet Take 1 tablet by mouth Every 6 (Six) Hours As Needed for Moderate Pain.    naloxone (Narcan) 4 MG/0.1ML nasal spray one spray in one nostril as needed for over sedation or respiratory depression from opioid use. Repeat one spray in alternate nostril every 2-3 minutes until responsive or EMS arrives    ondansetron ODT (ZOFRAN-ODT) 4 MG disintegrating tablet Place 1 tablet on the tongue Every 8 (Eight) Hours As Needed for Nausea or Vomiting.    pantoprazole (PROTONIX) 40 MG EC tablet Take 1 tablet by mouth Daily.    potassium chloride (KLOR-CON M20) 20 MEQ CR tablet Take 0.5 tablets by mouth Daily.    rOPINIRole (REQUIP) 1 MG tablet Take 1 tablet by mouth Every Night.    simvastatin (ZOCOR) 40 MG tablet Take 1 tablet by mouth Every Night.     No current facility-administered medications on file prior to visit.          Allergies:  Allergies   Allergen Reactions    Nitrofurantoin GI Intolerance    Sulfa Antibiotics Itching    Latex Rash       Objective   Objective     Vitals:   Temp:  [97.9 °F (36.6 °C)] 97.9 °F (36.6 °C)  Heart Rate:  [82] 82  Resp:  [18] 18  BP: (139-169)/() 139/65    Physical Exam    General: Awake, alert, NAD   Eyes:  EBONI   Neck: Supple   Lungs: Clear   Heart: RRR   Abdomen: benign   Musculoskeletal:  normal motor tone, symmetric   Skin: warm, normal turgor   Neuro: strength 5/5 all extremities   Pulses: +2 bilateral radial and brachial pulses.    Diagnostic studies:   A vein mapping ultrasound in the office today demonstrates inadequate left arm cephalic and basilic veins for permanent access with what appears to be satisfactory  upper arm both cephalic and basilic  veins.    Assessment & Plan   Assessment / Plan     Active Hospital Problems:  There are no active hospital problems to display for this patient.      Diagnoses and all orders for this visit:    1. CKD (chronic kidney disease) stage 4, GFR 15-29 ml/min (Primary)  -     Case Request; Standing  -     ceFAZolin (ANCEF) 2 g in sodium chloride 0.9 % 100 mL IVPB  -     Case Request    Other orders  -     Follow Anesthesia Guidelines / Protocol; Future  -     Follow Anesthesia Guidelines / Protocol; Standing  -     Provide NPO Instructions to Patient; Future  -     Chlorhexidine Skin Prep; Future  -     CBC & Differential; Standing  -     Basic Metabolic Panel; Standing        Assessment/plan:   Mrs. Buchanan is in need for permanent access for hemodialysis.  She is right-handed however her left arm veins are inadequate for permanent access.  The right upper arm cephalic appears satisfactory and I am recommending that we proceed to a right brachiocephalic arteriovenous fistula.  I have discussed with her in detail the mechanics of the procedure, the indications, benefits, risks, alternatives, as well as potential complications to include but not limited to infection, bleeding, reoperation, failure of the fistula to develop.  She appears to understand and desires to proceed.      Electronically signed by Ravin Woodall MD, 06/09/25, 4:28 PM EDT.

## 2025-06-09 NOTE — PROGRESS NOTES
[facility]   HISTORY AND PHYSICAL    Patient Name: Rupal Buchanan  : 1940  MRN: [id number]  Primary Care Physician:  Weston Grayson MD  Date of admission: (Not on file)    Subjective   Subjective     Chief Complaint: Need for permanent access for hemodialysis    HPI:    Rupal Buchanan is a 85 y.o. female getting ready for possible dialysis.  She is in need for permanent access.  Right-handed.    Review of Systems    Non contributory except for the History of Present Illness    Personal History     Past Medical History:   Diagnosis Date    Anemia     GERD (gastroesophageal reflux disease)     Heart disease     Hyperlipidemia     Hypertension     Kidney disease     Restless leg        Past Surgical History:   Procedure Laterality Date    APPENDECTOMY      CARDIAC CATHETERIZATION N/A 2022    Procedure: Left Heart Cath;  Surgeon: Hitesh Mcguire MD;  Location: [facility] INVASIVE LOCATION;  Service: Cardiovascular;  Laterality: N/A;    CARDIAC CATHETERIZATION N/A 2022    Procedure: Possible Percutaneous Coronary Intervention;  Surgeon: Hitesh Mcguire MD;  Location: [facility] INVASIVE LOCATION;  Service: Cardiovascular;  Laterality: N/A;    CARDIAC SURGERY      CHOLECYSTECTOMY         Family History: family history is not on file. Otherwise pertinent FHx was reviewed and not pertinent to current issue.    Social History:  reports that she has never smoked. She has quit using smokeless tobacco.  Her smokeless tobacco use included chew. She reports that she does not drink alcohol and does not use drugs.    Home Medications:  Current Outpatient Medications on File Prior to Visit   Medication Sig    ALPRAZolam (XANAX) 1 MG tablet Take 1 tablet by mouth Every 12 (Twelve) Hours.    amLODIPine (NORVASC) 5 MG tablet Take 1 tablet by mouth Daily.    apixaban (Eliquis) 2.5 MG tablet tablet Take 1 tablet by mouth Every 12 (Twelve) Hours.    atenolol (TENORMIN) 50 MG tablet Take 1  tablet by mouth Daily.    cholecalciferol (VITAMIN D3) 1.25 MG (11970 UT) capsule Take 1 capsule by mouth Every 7 (Seven) Days.    folic acid (FOLVITE) 1 MG tablet Take 1 tablet by mouth Daily.    furosemide (LASIX) 20 MG tablet Take 1 tablet by mouth Daily.    HYDROcodone-acetaminophen (NORCO) 7.5-325 MG per tablet Take 1 tablet by mouth Every 6 (Six) Hours As Needed for Moderate Pain.    naloxone (Narcan) 4 MG/0.1ML nasal spray one spray in one nostril as needed for over sedation or respiratory depression from opioid use. Repeat one spray in alternate nostril every 2-3 minutes until responsive or EMS arrives    ondansetron ODT (ZOFRAN-ODT) 4 MG disintegrating tablet Place 1 tablet on the tongue Every 8 (Eight) Hours As Needed for Nausea or Vomiting.    pantoprazole (PROTONIX) 40 MG EC tablet Take 1 tablet by mouth Daily.    potassium chloride (KLOR-CON M20) 20 MEQ CR tablet Take 0.5 tablets by mouth Daily.    rOPINIRole (REQUIP) 1 MG tablet Take 1 tablet by mouth Every Night.    simvastatin (ZOCOR) 40 MG tablet Take 1 tablet by mouth Every Night.     No current facility-administered medications on file prior to visit.          Allergies:  Allergies   Allergen Reactions    Nitrofurantoin GI Intolerance    Sulfa Antibiotics Itching    Latex Rash       Objective   Objective     Vitals:   Temp:  [97.9 °F (36.6 °C)] 97.9 °F (36.6 °C)  Heart Rate:  [82] 82  Resp:  [18] 18  BP: (139-169)/() 139/65    Physical Exam    General: Awake, alert, NAD   Eyes:  EBONI   Neck: Supple   Lungs: Clear   Heart: RRR   Abdomen: benign   Musculoskeletal:  normal motor tone, symmetric   Skin: warm, normal turgor   Neuro: strength 5/5 all extremities   Pulses: +2 bilateral radial and brachial pulses.    Diagnostic studies:   A vein mapping ultrasound in the office today demonstrates inadequate left arm cephalic and basilic veins for permanent access with what appears to be satisfactory  upper arm both cephalic and basilic  veins.    Assessment & Plan   Assessment / Plan     Active Hospital Problems:  There are no active hospital problems to display for this patient.      Diagnoses and all orders for this visit:    1. CKD (chronic kidney disease) stage 4, GFR 15-29 ml/min (Primary)  -     Case Request; Standing  -     ceFAZolin (ANCEF) 2 g in sodium chloride 0.9 % 100 mL IVPB  -     Case Request    Other orders  -     Follow Anesthesia Guidelines / Protocol; Future  -     Follow Anesthesia Guidelines / Protocol; Standing  -     Provide NPO Instructions to Patient; Future  -     Chlorhexidine Skin Prep; Future  -     CBC & Differential; Standing  -     Basic Metabolic Panel; Standing        Assessment/plan:   Mrs. Buchanan is in need for permanent access for hemodialysis.  She is right-handed however her left arm veins are inadequate for permanent access.  The right upper arm cephalic appears satisfactory and I am recommending that we proceed to a right brachiocephalic arteriovenous fistula.  I have discussed with her in detail the mechanics of the procedure, the indications, benefits, risks, alternatives, as well as potential complications to include but not limited to infection, bleeding, reoperation, failure of the fistula to develop.  She appears to understand and desires to proceed.      Electronically signed by Ravin Woodall MD, 06/09/25, 4:28 PM EDT.

## 2025-06-09 NOTE — PROGRESS NOTES
Informed patient to arrive at [address], on 7/1/2025. Nothing to eat or drink after midnight the night before the procedure. If needed, patient was also given PAT apt on (No PAT required, Dialysis patient, Kaitlin-Hex 4 given to patient with instruction on how and when to use prior to procedure.) . Patient was instructed to take meds in am with sips of water unless asked to hold by MD. Patient told to hold Eliquis 2 days prior, may continue all other meds per Dr Woodall. Medications held by MD were reviewed with patient to assure understanding. Patient was also advised they will need a  the day of the procedure if they are having an out-patient procedure. The procedure itself has been explained in detail by the MD. Staff has confirmed if they have any questions afterwards. A post-op follow up was made on 7/16/2025

## 2025-06-12 ENCOUNTER — PATIENT ROUNDING (BHMG ONLY) (OUTPATIENT)
Age: 85
End: 2025-06-12
Payer: MEDICARE

## 2025-06-30 NOTE — PRE-PROCEDURE INSTRUCTIONS
PATIENT INSTRUCTED TO BE:    - NOTHING TO EAT AFTER MIDNIGHT OR CHEW, EXCEPT CAN HAVE SIPS OF WATER WITH MEDICATIONS OR CLEAR LIQUIDS 2 HOURS PRIOR TO SURGERY ARRIVAL TIME , NO MORE THAN 8 OZ. (NOTHING RED)     - TO HOLD ALL VITAMINS, SUPPLEMENTS, NSAIDS FOR ONE WEEK PRIOR TO THEIR SURGICAL PROCEDURE    - DO NOT TAKE ______________________ 7 DAYS PRIOR TO PROCEDURE PER ANESTHESIA RECOMMENDATIONS/INSTRUCTIONS     - INSTRUCTED PT TO USE SURGICAL SOAP 1 TIME THE NIGHT PRIOR TO SURGERY ___________ OR THE AM OF SURGERY _____________   USE THE SOAP FROM NECK TO TOES, AVOID THEIR FACE, HAIR, AND PRIVATE PARTS. IF USE THE SOAP THE NIGHT PRIOR TO SURGERY, CHANGE BED LINENS AND NO PETS IN THE BED.     INSTRUCTED NO LOTIONS, JEWELRY, PIERCINGS,  NAIL POLISH, OR DEODORANT DAY OF SURGERY    - IF DIABETIC, CHECK BLOOD GLUCOSE IF LESS THAN 70 OR HAVING SYMPTOMS CALL THE PREOP AREA FOR INSTRUCTIONS ON AM OF SURGERY ([phone] -INSTRUCTED TO TAKE THE FOLLOWING MEDICATIONS THE DAY OF SURGERY WITH SIPS OF WATER:     XANAX, ATENOLOL, NORCO PRN    HOLD MEDS : LAST DOSE ELIQUIS 6/28/25 PM, VITAMIN/SUPP/LASIX/K+ LD 6/29/25      - DO NOT BRING ANY MEDICATIONS WITH YOU TO THE HOSPITAL THE DAY OF SURGERY, EXCEPT IF USE INHALERS. BRING INHALERS DAY OF SURGERY       - BRING CPAP OR BIPAP TO THE HOSPITAL ONLY IF YOU ARE SPENDING THE NIGHT    - DO NOT SMOKE OR VAPE 24 HOURS PRIOR TO PROCEDURE PER ANESTHESIA REQUEST     -MAKE SURE YOU HAVE A RIDE HOME OR SOMEONE TO STAY WITH YOU THE DAY OF THE PROCEDURE AFTER YOU GO HOME     - FOLLOW ANY OTHER INSTRUCTIONS GIVEN TO YOU BY YOUR SURGEON'S OFFICE.     - DAY OF SURGERY ____________,[facility] ( [address]), YOU CAN  PARK OR SELF PARK. ENTER THE PAVILION THRU MAIN ENTRANCE, TAKE ELEVATORS TO THE FIRST FLOOR, CHECK IN AT THE DESK FOR REGISTRATION/ SURGERY. - YOU WILL RECEIVE A PHONE CALL THE DAY PRIOR TO SURGERY BETWEEN 1PM AND 4 PM WITH ARRIVAL TIME, IF YOUR  SURGERY IS ON A MONDAY YOU WILL RECEIVE A CALL THE FRIDAY PRIOR TO SURGERY DATE    - BRING CASH OR CREDIT CARD FOR COPAYMENT OF MEDICATIONS AFTER SURGERY IF YOU USE THE HOSPITAL PHARMACY (MEDS TO BED)    - PREADMISSION TESTING NURSE [phone] IF HAVE ANY QUESTIONS     -PATIENT PROVIDED THE NUMBER FOR PREOP SURGICAL DEPT IF HAD QUESTIONS AFTER HOURS PRIOR TO SURGERY ([phone]).  INFORMED PT IF NO ANSWER, LEAVE A MESSAGE AND SOMEONE WILL RETURN THEIR CALL       PATIENT VERBALIZED UNDERSTANDING

## 2025-07-01 ENCOUNTER — HOSPITAL ENCOUNTER (OUTPATIENT)
Facility: HOSPITAL | Age: 85
Setting detail: HOSPITAL OUTPATIENT SURGERY
Discharge: HOME | End: 2025-07-01
Attending: SURGERY | Admitting: SURGERY
Payer: MEDICARE

## 2025-07-01 ENCOUNTER — ANESTHESIA (OUTPATIENT)
Dept: PERIOP | Facility: HOSPITAL | Age: 85
End: 2025-07-01
Payer: MEDICARE

## 2025-07-01 ENCOUNTER — ANESTHESIA EVENT (OUTPATIENT)
Dept: PERIOP | Facility: HOSPITAL | Age: 85
End: 2025-07-01
Payer: MEDICARE

## 2025-07-01 VITALS
OXYGEN SATURATION: 93 % | SYSTOLIC BLOOD PRESSURE: 130 MMHG | WEIGHT: 140.87 LBS | DIASTOLIC BLOOD PRESSURE: 73 MMHG | BODY MASS INDEX: 23.47 KG/M2 | HEART RATE: 74 BPM | RESPIRATION RATE: 16 BRPM | HEIGHT: 65 IN | TEMPERATURE: 97.2 F

## 2025-07-01 DIAGNOSIS — N18.4 CKD (CHRONIC KIDNEY DISEASE) STAGE 4, GFR 15-29 ML/MIN: ICD-10-CM

## 2025-07-01 LAB
ANION GAP SERPL CALCULATED.3IONS-SCNC: 12 MMOL/L (ref 5–15)
BASOPHILS # BLD AUTO: 0.04 10*3/MM3 (ref 0–0.2)
BASOPHILS NFR BLD AUTO: 0.5 % (ref 0–1.5)
BUN SERPL-MCNC: 42.9 MG/DL (ref 8–23)
BUN/CREAT SERPL: 19.1 (ref 7–25)
CALCIUM SPEC-SCNC: 9.1 MG/DL (ref 8.6–10.5)
CHLORIDE SERPL-SCNC: 104 MMOL/L (ref 98–107)
CO2 SERPL-SCNC: 23 MMOL/L (ref 22–29)
CREAT SERPL-MCNC: 2.25 MG/DL (ref 0.57–1)
DEPRECATED RDW RBC AUTO: 42.5 FL (ref 37–54)
EGFRCR SERPLBLD CKD-EPI 2021: 20.8 ML/MIN/1.73
EOSINOPHIL # BLD AUTO: 0.37 10*3/MM3 (ref 0–0.4)
EOSINOPHIL NFR BLD AUTO: 4.7 % (ref 0.3–6.2)
ERYTHROCYTE [DISTWIDTH] IN BLOOD BY AUTOMATED COUNT: 11.9 % (ref 12.3–15.4)
GLUCOSE SERPL-MCNC: 108 MG/DL (ref 65–99)
HCT VFR BLD AUTO: 32.1 % (ref 34–46.6)
HGB BLD-MCNC: 10.8 G/DL (ref 12–15.9)
IMM GRANULOCYTES # BLD AUTO: 0.04 10*3/MM3 (ref 0–0.05)
IMM GRANULOCYTES NFR BLD AUTO: 0.5 % (ref 0–0.5)
LYMPHOCYTES # BLD AUTO: 2.26 10*3/MM3 (ref 0.7–3.1)
LYMPHOCYTES NFR BLD AUTO: 28.6 % (ref 19.6–45.3)
MCH RBC QN AUTO: 32.7 PG (ref 26.6–33)
MCHC RBC AUTO-ENTMCNC: 33.6 G/DL (ref 31.5–35.7)
MCV RBC AUTO: 97.3 FL (ref 79–97)
MONOCYTES # BLD AUTO: 0.84 10*3/MM3 (ref 0.1–0.9)
MONOCYTES NFR BLD AUTO: 10.6 % (ref 5–12)
NEUTROPHILS NFR BLD AUTO: 4.35 10*3/MM3 (ref 1.7–7)
NEUTROPHILS NFR BLD AUTO: 55.1 % (ref 42.7–76)
NRBC BLD AUTO-RTO: 0.3 /100 WBC (ref 0–0.2)
PLATELET # BLD AUTO: 155 10*3/MM3 (ref 140–450)
PMV BLD AUTO: 9.9 FL (ref 6–12)
POTASSIUM SERPL-SCNC: 4.1 MMOL/L (ref 3.5–5.2)
RBC # BLD AUTO: 3.3 10*6/MM3 (ref 3.77–5.28)
SODIUM SERPL-SCNC: 139 MMOL/L (ref 136–145)
WBC NRBC COR # BLD AUTO: 7.9 10*3/MM3 (ref 3.4–10.8)

## 2025-07-01 PROCEDURE — 85025 COMPLETE CBC W/AUTO DIFF WBC: CPT | Performed by: SURGERY

## 2025-07-01 PROCEDURE — 25010000002 HYDROMORPHONE 1 MG/ML SOLUTION

## 2025-07-01 PROCEDURE — 25810000003 SODIUM CHLORIDE 0.9 % SOLUTION: Performed by: ANESTHESIOLOGY

## 2025-07-01 PROCEDURE — 25010000002 HEPARIN (PORCINE) PER 1000 UNITS: Performed by: SURGERY

## 2025-07-01 PROCEDURE — 36821 AV FUSION DIRECT ANY SITE: CPT | Performed by: SURGERY

## 2025-07-01 PROCEDURE — 25010000002 CEFAZOLIN PER 500 MG: Performed by: SURGERY

## 2025-07-01 PROCEDURE — 25010000002 FENTANYL CITRATE (PF) 50 MCG/ML SOLUTION

## 2025-07-01 PROCEDURE — 25010000002 DEXAMETHASONE PER 1 MG

## 2025-07-01 PROCEDURE — 25010000002 HEPARIN (PORCINE) PER 1000 UNITS: Performed by: NURSE ANESTHETIST, CERTIFIED REGISTERED

## 2025-07-01 PROCEDURE — 80048 BASIC METABOLIC PNL TOTAL CA: CPT | Performed by: SURGERY

## 2025-07-01 PROCEDURE — 25010000002 LIDOCAINE PF 2% 2 % SOLUTION

## 2025-07-01 PROCEDURE — 25010000002 BUPIVACAINE (PF) 0.5 % SOLUTION 10 ML VIAL: Performed by: SURGERY

## 2025-07-01 PROCEDURE — 25010000002 ONDANSETRON PER 1 MG

## 2025-07-01 PROCEDURE — 25010000002 PROPOFOL 10 MG/ML EMULSION

## 2025-07-01 PROCEDURE — 25010000002 LIDOCAINE 1 % SOLUTION 20 ML VIAL: Performed by: SURGERY

## 2025-07-01 PROCEDURE — 25010000002 MIDAZOLAM PER 1MG: Performed by: ANESTHESIOLOGY

## 2025-07-01 DEVICE — LIGACLIP MCA MULTIPLE CLIP APPLIERS, 20 MEDIUM CLIPS
Type: IMPLANTABLE DEVICE | Site: ARM | Status: FUNCTIONAL
Brand: LIGACLIP

## 2025-07-01 DEVICE — ABSORBABLE HEMOSTAT (OXIDIZED REGENERATED CELLULOSE)
Type: IMPLANTABLE DEVICE | Site: ARM | Status: FUNCTIONAL
Brand: SURGICEL

## 2025-07-01 DEVICE — LIGACLIP MCA MULTIPLE CLIP APPLIERS, 20 SMALL CLIPS
Type: IMPLANTABLE DEVICE | Site: ARM | Status: FUNCTIONAL
Brand: LIGACLIP

## 2025-07-01 RX ORDER — ONDANSETRON 2 MG/ML
INJECTION INTRAMUSCULAR; INTRAVENOUS AS NEEDED
Status: DISCONTINUED | OUTPATIENT
Start: 2025-07-01 | End: 2025-07-01 | Stop reason: SURG

## 2025-07-01 RX ORDER — EPHEDRINE SULFATE 50 MG/ML
INJECTION INTRAVENOUS AS NEEDED
Status: DISCONTINUED | OUTPATIENT
Start: 2025-07-01 | End: 2025-07-01 | Stop reason: SURG

## 2025-07-01 RX ORDER — PROMETHAZINE HYDROCHLORIDE 25 MG/1
25 SUPPOSITORY RECTAL ONCE AS NEEDED
Status: DISCONTINUED | OUTPATIENT
Start: 2025-07-01 | End: 2025-07-01 | Stop reason: HOSPADM

## 2025-07-01 RX ORDER — DEXAMETHASONE SODIUM PHOSPHATE 4 MG/ML
INJECTION, SOLUTION INTRA-ARTICULAR; INTRALESIONAL; INTRAMUSCULAR; INTRAVENOUS; SOFT TISSUE AS NEEDED
Status: DISCONTINUED | OUTPATIENT
Start: 2025-07-01 | End: 2025-07-01 | Stop reason: SURG

## 2025-07-01 RX ORDER — PROPOFOL 10 MG/ML
VIAL (ML) INTRAVENOUS AS NEEDED
Status: DISCONTINUED | OUTPATIENT
Start: 2025-07-01 | End: 2025-07-01 | Stop reason: SURG

## 2025-07-01 RX ORDER — MIDAZOLAM HYDROCHLORIDE 2 MG/2ML
0.5 INJECTION, SOLUTION INTRAMUSCULAR; INTRAVENOUS ONCE
Status: COMPLETED | OUTPATIENT
Start: 2025-07-01 | End: 2025-07-01

## 2025-07-01 RX ORDER — PETROLATUM,WHITE
OINTMENT IN PACKET (GRAM) TOPICAL AS NEEDED
Status: DISCONTINUED | OUTPATIENT
Start: 2025-07-01 | End: 2025-07-01 | Stop reason: SURG

## 2025-07-01 RX ORDER — FENTANYL CITRATE 50 UG/ML
INJECTION, SOLUTION INTRAMUSCULAR; INTRAVENOUS AS NEEDED
Status: DISCONTINUED | OUTPATIENT
Start: 2025-07-01 | End: 2025-07-01 | Stop reason: SURG

## 2025-07-01 RX ORDER — LIDOCAINE HYDROCHLORIDE 20 MG/ML
INJECTION, SOLUTION EPIDURAL; INFILTRATION; INTRACAUDAL; PERINEURAL AS NEEDED
Status: DISCONTINUED | OUTPATIENT
Start: 2025-07-01 | End: 2025-07-01 | Stop reason: SURG

## 2025-07-01 RX ORDER — HEPARIN SODIUM 1000 [USP'U]/ML
INJECTION, SOLUTION INTRAVENOUS; SUBCUTANEOUS AS NEEDED
Status: DISCONTINUED | OUTPATIENT
Start: 2025-07-01 | End: 2025-07-01 | Stop reason: SURG

## 2025-07-01 RX ORDER — OXYCODONE AND ACETAMINOPHEN 5; 325 MG/1; MG/1
1 TABLET ORAL EVERY 6 HOURS PRN
Qty: 20 TABLET | Refills: 0 | Status: SHIPPED | OUTPATIENT
Start: 2025-07-01

## 2025-07-01 RX ORDER — SODIUM CHLORIDE 9 MG/ML
9 INJECTION, SOLUTION INTRAVENOUS ONCE AS NEEDED
Status: DISCONTINUED | OUTPATIENT
Start: 2025-07-01 | End: 2025-07-01 | Stop reason: HOSPADM

## 2025-07-01 RX ORDER — OXYCODONE HYDROCHLORIDE 5 MG/1
5 TABLET ORAL
Refills: 0 | Status: DISCONTINUED | OUTPATIENT
Start: 2025-07-01 | End: 2025-07-01 | Stop reason: HOSPADM

## 2025-07-01 RX ORDER — ONDANSETRON 2 MG/ML
4 INJECTION INTRAMUSCULAR; INTRAVENOUS ONCE AS NEEDED
Status: DISCONTINUED | OUTPATIENT
Start: 2025-07-01 | End: 2025-07-01 | Stop reason: HOSPADM

## 2025-07-01 RX ORDER — ACETAMINOPHEN 500 MG
1000 TABLET ORAL ONCE
Status: COMPLETED | OUTPATIENT
Start: 2025-07-01 | End: 2025-07-01

## 2025-07-01 RX ORDER — PROMETHAZINE HYDROCHLORIDE 25 MG/1
25 TABLET ORAL ONCE AS NEEDED
Status: DISCONTINUED | OUTPATIENT
Start: 2025-07-01 | End: 2025-07-01 | Stop reason: HOSPADM

## 2025-07-01 RX ADMIN — LIDOCAINE HYDROCHLORIDE 40 MG: 20 INJECTION, SOLUTION INTRAVENOUS at 13:53

## 2025-07-01 RX ADMIN — FENTANYL CITRATE 25 MCG: 50 INJECTION, SOLUTION INTRAMUSCULAR; INTRAVENOUS at 13:53

## 2025-07-01 RX ADMIN — OXYCODONE HYDROCHLORIDE 5 MG: 5 TABLET ORAL at 15:18

## 2025-07-01 RX ADMIN — EPHEDRINE SULFATE 10 MG: 50 INJECTION INTRAVENOUS at 14:05

## 2025-07-01 RX ADMIN — Medication 1 PACKAGE: at 13:55

## 2025-07-01 RX ADMIN — EPHEDRINE SULFATE 10 MG: 50 INJECTION INTRAVENOUS at 14:29

## 2025-07-01 RX ADMIN — PROPOFOL 100 MG: 10 INJECTION, EMULSION INTRAVENOUS at 13:53

## 2025-07-01 RX ADMIN — SODIUM CHLORIDE 2 G: 9 INJECTION, SOLUTION INTRAVENOUS at 13:57

## 2025-07-01 RX ADMIN — ONDANSETRON 4 MG: 2 INJECTION INTRAMUSCULAR; INTRAVENOUS at 14:00

## 2025-07-01 RX ADMIN — SODIUM CHLORIDE 9 ML/HR: 9 INJECTION, SOLUTION INTRAVENOUS at 12:20

## 2025-07-01 RX ADMIN — DEXAMETHASONE SODIUM PHOSPHATE 4 MG: 4 INJECTION, SOLUTION INTRAMUSCULAR; INTRAVENOUS at 14:00

## 2025-07-01 RX ADMIN — MIDAZOLAM HYDROCHLORIDE 0.5 MG: 1 INJECTION, SOLUTION INTRAMUSCULAR; INTRAVENOUS at 13:24

## 2025-07-01 RX ADMIN — ACETAMINOPHEN 1000 MG: 500 TABLET ORAL at 12:19

## 2025-07-01 RX ADMIN — HYDROMORPHONE HYDROCHLORIDE 0.25 MG: 1 INJECTION, SOLUTION INTRAMUSCULAR; INTRAVENOUS; SUBCUTANEOUS at 15:18

## 2025-07-01 RX ADMIN — HEPARIN SODIUM 3000 UNITS: 1000 INJECTION INTRAVENOUS; SUBCUTANEOUS at 14:14

## 2025-07-01 NOTE — ANESTHESIA POSTPROCEDURE EVALUATION
Patient: Rupal Buchanan    Procedure Summary       Date: 07/01/25 Room / Location: [facility] OR 12 / [facility] HYBRID OR    Anesthesia Start: 1347 Anesthesia Stop: 1453    Procedure: Creation of right brachiocephalic arteriovenous fistula (Right: Arm Upper) Diagnosis:       CKD (chronic kidney disease) stage 4, GFR 15-29 ml/min      (CKD (chronic kidney disease) stage 4, GFR 15-29 ml/min [N18.4])    Surgeons: Ravin Woodall MD Provider: Alfonzo Duncan MD    Anesthesia Type: general, MAC ASA Status: 4            Anesthesia Type: general, MAC    Vitals  Vitals Value Taken Time   /75 07/01/25 15:43   Temp 37.1 °C (98.7 °F) 07/01/25 15:42   Pulse 60 07/01/25 15:47   Resp 16 07/01/25 15:42   SpO2 95 % 07/01/25 15:47   Vitals shown include unfiled device data.        Post Anesthesia Care and Evaluation    Patient location during evaluation: bedside  Patient participation: complete - patient participated  Level of consciousness: awake    Airway patency: patent  PONV Status: none  Cardiovascular status: acceptable  Respiratory status: acceptable  Hydration status: acceptable

## 2025-07-01 NOTE — DISCHARGE INSTRUCTIONS
May wash area in 2 days.  Follow-up in the office in 2 weeks, call for appointment.  Resume home diet.  Resume home medications.  No lifting greater than 15 pounds for 2 weeks.  May resume Eliquis on Wednesday, 7/2/2025.     DISCHARGE INSTRUCTIONS  DIALYSIS SHUNT      For your surgery you had:  General anesthesia (you may have a sore throat for the first 24 hours)  IV sedation.  Local anesthesia  Monitored anesthesia Care  You received a medicated patch for nausea prevention today (behind your ear). It is recommended that you remove it 24-48 hours post-operatively. It must be removed within 72 hours.   You have received an anesthesia medication today that can cause hormonal forms of birth control to be ineffective. You should use a different form of birth control (to prevent pregnancy) for 7 days.   You may experience dizziness, drowsiness, or light-headedness for several hours following surgery/procedure.  Do not stay alone today or tonight.  Limit your activity for 24 hours.  Resume your diet slowly.  Follow whatever special dietary instructions you may have been given by your doctor.  You should not drive or operate machinery, drink alcohol, or sign legally binding documents for 24 hours or while you are taking pain medication.    NOTIFY YOUR DOCTOR IF YOU EXPERIENCE ANY OF THE FOLLOWING:  Temperature greater than 101 degrees Fahrenheit  Shaking Chills  Redness or excessive drainage from incision  Nausea, vomiting and/or pain that is not controlled by prescribed medications  Increase in bleeding or bleeding that is excessive  Unable to urinate in 6 hours after surgery  If unable to reach your doctor, please go to the closest Emergency Room  Keep Right arm elevated on pillows.    Limit excessive bending of extremity on _Right_ side.    No tight clothing to site.    No needle sticks or blood pressures in Right arm.    Limit sleeping on Right side.    Notify physician of excessive bleeding at site.    Last dose of  pain medication was given at:   .    SPECIAL INSTRUCTIONS:    May wash area in 2 days.  Follow-up in the office in 2 weeks, call for appointment.  Resume home diet.  Resume home medications.  No lifting greater than 15 pounds for 2 weeks.  May resume Eliquis on Wednesday, 7/2/2025.

## 2025-07-01 NOTE — OP NOTE
ARTERIOVENOUS FISTULA FORMATION  Procedure Report    Patient Name:  Rupal Buchanna  YOB: 1940    Date of Surgery:  7/1/2025     Indications: Need for permanent access for hemodialysis    Pre-op Diagnosis:   CKD (chronic kidney disease) stage 4, GFR 15-29 ml/min [N18.4]       Post-Op Diagnosis Codes:     * CKD (chronic kidney disease) stage 4, GFR 15-29 ml/min [N18.4]    Procedure(s):  Creation of right brachiocephalic arteriovenous fistula    Staff:  Surgeon(s):  Ravin Woodall MD    Assistant: Hope Morel RN CSA    Anesthesia: General    Estimated Blood Loss: 20 mL    Implants:    Implant Name Type Inv. Item Serial No.  Lot No. LRB No. Used Action   CLIPAPPLR M/ ENDO LIGACLIP 20CLP 11IN MD - [id number] Implant CLIPAPPLR M/ ENDO LIGACLIP 20CLP 11IN MD  [employer]  DIV [employer] J [employer] J [id number] Right 1 Implanted   CLIPAPPLR M/ ENDO LIGACLIP 9 3/8IN SM - [id number] Implant CLIPAPPLR M/ ENDO LIGACLIP 9 3/8IN SM  [employer]  DIV [employer] J [employer] J [id number] Right 1 Implanted   HEMOST ABS SURGICEL ORIG 2X14IN STRL - [id number] Implant HEMOST ABS SURGICEL ORIG 2X14IN STRL  [employer]  DIV [employer] J [employer] J [id number] Right 1 Implanted       Specimen: None       Findings: Palpable thrill upon completion of the procedure.  Doppler examination reveals a pulsatile signal with high amplitude continuous diastolic flow.    Complications: None    Description of Procedure: The patient was brought into the operating room and was placed in the supine position.  The patient was then given intravenous sedation by anesthesia and positioned with the right arm extended on an armboard.  The patient was then prepped and draped in the usual aseptic fashion.  A stockinette was applied to the hand and forearm and was secured in place using Coban.  A timeout was taken to confirm patient, procedure and laterality.  Local anesthesia was then administered at the projected site of incision.  An incision was then made in a  transverse fashion over the distal right upper arm approximately 1 fingerbreadth above the antecubital crease.  The subcutaneous tissue was traversed using electrocautery.  Blunt and sharp dissection were then used to dissect the cephalic vein.  This was dissected approximately 2 to 3 cm distal and proximal to the incision.  The vein was marked with a marker to avoid twisting.  The distal end was then clipped and the vein transected.  The vein was then flushed with heparinized saline and probed with a #3 dilator with no resistance.  It was controlled with a bulldog.  Blunt and sharp dissection were then used to dissected the brachial artery.  A segment of approximately 2 cm was freed.  Proximal and distal control was obtained using Vesseloops.  The patient was given systemic anticoagulation in the form of heparin 3000 units IV.  3 minutes were allowed for the heparin to circulate.  The brachial artery was then clamped distally and proximally.  A longitudinal arteriotomy was made using an 11 blade and extended using Hatfield scissors.  The total length of the arteriotomy was 6 mm.  The end of the vein was then fashioned to meet the dimensions of the arteriotomy.  An anastomosis was then created using 6-0 Prolene in a running fashion.  Just prior to completion of the anastomosis all vessels were bled.  The anastomosis was completed.  Doppler examination revealed the above-mentioned findings.  The wound was inspected for hemostasis and hemostasis assured.  The wound was then approximated using 3-0 Vicryl in a running fashion for approximation of the dermis and subcutaneous tissue.  Dermabond was then applied to the wound.  The patient tolerated the procedure well.         Assistant: Hope Morel RN CSA  was responsible for performing the following activities: First assist and their skilled assistance was necessary for the success of this case.    Ravin Woodall MD     Date: 7/1/2025  Time: 14:55 EDT

## 2025-07-01 NOTE — ANESTHESIA PREPROCEDURE EVALUATION
Anesthesia Evaluation                  Airway   Mallampati: I  TM distance: >3 FB  Neck ROM: full  No difficulty expected  Dental    (+) edentulous    Pulmonary - normal exam    breath sounds clear to auscultation  Cardiovascular     Rhythm: irregular  Rate: normal    (+) hypertension, valvular problems/murmurs MR and AI, CAD, dysrhythmias, angina, murmur, hyperlipidemia      Neuro/Psych  GI/Hepatic/Renal/Endo    (+) GERD, renal disease- ESRD and dialysis    Musculoskeletal     Abdominal    Substance History      OB/GYN          Other   arthritis,     ROS/Med Hx Other: ECG -  Atrial fibrillation  Left axis deviation  Left bundle branch block  Anterior  infarct, old  Prolonged QT interval  When compared with ECG of 17-Aug-2024 17:47:26,  Nonspecific significant change  Electronically Signed By: Parker Cosme ([facility]) 2025-05-06 18:55:54  Date and Time of Study:2025-05-06 14:44:53    1/5/22 echo  · Calculated left ventricular EF = 53.6% Estimated left ventricular EF was in agreement with the calculated left ventricular EF. Left ventricular systolic function is normal.  · Moderate mitral valve regurgitation is present.  · The aortic valve exhibits sclerosis. Mild aortic valve regurgitation is present.  · The left atrial cavity is mild to moderately dilated                      Anesthesia Plan    ASA 4     general and MAC     (Most Recent  Sodium: 138 5/6/25 14:56  Sodium: 137 8/17/24 18:57  Potassium: 5.5 (H) 5/6/25 14:56  Potassium: 5.3 (H) 8/17/24 18:57  Chloride: 102 5/6/25 14:56  Chloride: 103 8/17/24 18:57  CO2: 25.8 5/6/25 14:56  CO2: 30 1/15/21 15:09  Anion Gap: 10.2 5/6/25 14:56  BUN: 39 (H) 5/6/25 14:56  BUN: 46 (H) 8/17/24 18:57  Creatinine: 2.06 (H) 5/6/25 14:56  BUN/Creatinine Ratio: 18.9 5/6/25 14:56  eGFR: 23.1 (L) 5/6/25 14:56  Glucose: 101 (H) 5/6/25 14:56  Calcium: 9.3 5/6/25 14:56  Ionized Calcium: 1.19 8/17/24 18:57  Magnesium: 2.1 5/6/25 14:56  Phosphorus: 3.8 (E) 11/18/24 14:26  Alkaline Phosphatase:  128 (H) 25 14:56  Total Protein: 7.5 25 14:56  Albumin: 4.7 25 14:56  Globulin: 2.8 25 14:56  A/G Ratio: 1.7 25 14:56  AST (SGOT): 23 25 14:56  ALT (SGPT): 8 25 14:56  Total Bilirubin: 1.2 25 14:56  Bilirubin, Direct: 0.2 22 04:34  Bilirubin, Indirect: 0.3 22 04:34  eGFR Non  Am: 32 (L) 22 04:34  Hemoglobin A1C: 5.50 1/3/22 04:15  PTH Intact (Serial Monitor): 161 (H) (E) 3/21/25 13:28  Total Cholesterol: 135 1/3/22 04:15  HDL Cholesterol: 63 (H) 1/3/22 04:15  LDL Cholesterol : 54 1/3/22 04:15  VLDL Cholesterol: 18 1/3/22 04:15  Triglycerides: 97 1/3/22 04:15  LDL/HDL Ratio: 0.83 1/3/22 04:15  Lactate: 0.9 24 16:07  Lipase: 59 25 14:56  Osmolality Ca 1/15/21 15:09  25 Hydroxy, Vitamin D: 44.4 (E) 3/21/25 13:28  Protime: 10.6 22 12:43  INR: 1.01 (L) 22 12:43  WBC: 6.75 25 14:56  RBC: 3.04 (L) 25 14:56  Hemoglobin: 11.8 (L) 25 14:56  Hematocrit: 31.3 (L) 25 14:56  Platelets: 180 25 14:56      (H): Data is abnormally high  (L): Data is abnormally low  (E): External lab result)  intravenous induction     Anesthetic plan, risks, benefits, and alternatives have been provided, discussed and informed consent has been obtained with: patient.        CODE STATUS:

## 2025-07-16 ENCOUNTER — OFFICE VISIT (OUTPATIENT)
Age: 85
End: 2025-07-16
Payer: MEDICARE

## 2025-07-16 VITALS
OXYGEN SATURATION: 98 % | DIASTOLIC BLOOD PRESSURE: 78 MMHG | RESPIRATION RATE: 18 BRPM | SYSTOLIC BLOOD PRESSURE: 135 MMHG | HEART RATE: 87 BPM

## 2025-07-16 DIAGNOSIS — Z98.890 STATUS POST CREATION OF ARTERIOVENOUS FISTULA: ICD-10-CM

## 2025-07-16 DIAGNOSIS — N18.4 CKD (CHRONIC KIDNEY DISEASE) STAGE 4, GFR 15-29 ML/MIN: Primary | ICD-10-CM

## 2025-07-16 NOTE — PROGRESS NOTES
[facility]     Progress Note    Patient Name: Rupal Buchanan  : 1940  MRN: [id number]  Primary Care Physician:  Weston Grayson MD  Date of admission: (Not on file)  Chief Complaint:    Chief Complaint   Patient presents with    Hemodialysis Access    Follow-up     Patient is here as two week follow up s/p creation of right brachiobasilic AVF. Positive for thrill and bruit. Patient has not yet started dialysis.        Subjective   Subjective     Rupal Buchanan is a 85 y.o. female     History of Present Illness  The patient is here for a 2-week follow-up after undergoing a right brachiocephalic arteriovenous fistula creation performed by Dr. Woodall on 2025.    She reports that her arm is in good condition with no current pain, although she experienced some discomfort initially. She is able to move her fingers without any issues and does not experience any burning or tingling sensations. She is not currently on dialysis but is preparing for it under the care of Dr. Gayle, whom she will see again on 2025. She occasionally experiences pain in her hand, which feels cold, but there is no numbness. She is able to use her hand without pain and was able to bake a cake yesterday. She uses her arms frequently to assist with standing up from the couch. They have considered getting a lift chair, but the medicare has not provided assistance for this.        Objective   Objective     Vitals:   Heart Rate:  [87] 87  Resp:  [18] 18  BP: (135)/(78) 135/78  BMI is within normal parameters. No other follow-up for BMI required.     Physical Exam   General: Alert no acute distress  Extremities: Right arm, healing surgical incision, no open areas, no drainage, mild edema, excellent thrill and bruit, +2 palpable radial pulse, motor and sensory skills intact.  Neuro:    Result Review    Result Review:  I have personally reviewed the results from the time of this admission to 2025 13:39 EDT and agree with  "these findings:  []  Laboratory  []  Microbiology  []  Radiology  []  EKG/Telemetry   []  Cardiology/Vascular   []  Pathology  []  Old records  []  Other:    Most notable findings include:   Results        Assessment & Plan   Assessment / Plan     Assessment/Plan:  Diagnoses and all orders for this visit:    1. CKD (chronic kidney disease) stage 4, GFR 15-29 ml/min (Primary)    2. Status post creation of arteriovenous fistula      Assessment & Plan  1. Post-operative follow-up for right brachiocephalic arteriovenous fistula creation.  The fistula is functioning as expected, with no complications reported. She was advised to monitor the fistula for any changes and to contact the clinic immediately if she does not feel the \"motor\" sensation. A stress ball was provided for her to use, with instructions to squeeze it approximately 100 times while watching TV or during other activities. She was also encouraged to continue using her arm for daily activities.    2. Leg pain.  She reports significant pain and swelling in her knee, which has been affecting her mobility. She was advised to monitor the symptoms and use a cane if necessary to aid in walking. If the pain persists or worsens, further evaluation will be needed.    Follow-up  A follow-up appointment is scheduled for 4 weeks from now.      Active Hospital Problems:  There are no active hospital problems to display for this patient.        Patient or patient representative verbalized consent for the use of Ambient Listening during the visit with  KEELEY Ramirez for chart documentation. 7/16/2025  13:41 EDT    Electronically signed by KEELEY Ramirez, 07/16/25, 1:37 PM EDT.  "

## 2025-07-19 ENCOUNTER — HOSPITAL ENCOUNTER (EMERGENCY)
Facility: HOSPITAL | Age: 85
Discharge: HOME | End: 2025-07-19
Attending: EMERGENCY MEDICINE
Payer: MEDICARE

## 2025-07-19 ENCOUNTER — APPOINTMENT (OUTPATIENT)
Dept: GENERAL RADIOLOGY | Facility: HOSPITAL | Age: 85
End: 2025-07-19
Payer: MEDICARE

## 2025-07-19 VITALS
HEART RATE: 81 BPM | OXYGEN SATURATION: 99 % | WEIGHT: 144.18 LBS | DIASTOLIC BLOOD PRESSURE: 67 MMHG | RESPIRATION RATE: 18 BRPM | BODY MASS INDEX: 24.02 KG/M2 | HEIGHT: 65 IN | SYSTOLIC BLOOD PRESSURE: 116 MMHG | TEMPERATURE: 97.5 F

## 2025-07-19 DIAGNOSIS — M25.562 PAIN AND SWELLING OF LEFT KNEE: Primary | ICD-10-CM

## 2025-07-19 DIAGNOSIS — M25.462 PAIN AND SWELLING OF LEFT KNEE: Primary | ICD-10-CM

## 2025-07-19 PROCEDURE — 99283 EMERGENCY DEPT VISIT LOW MDM: CPT

## 2025-07-19 PROCEDURE — 73562 X-RAY EXAM OF KNEE 3: CPT

## 2025-07-19 NOTE — ED PROVIDER NOTES
"SHARED VISIT ATTESTATION:    This visit was performed by myself and an APC.  I personally approved the management plan/medical decision making and take responsibility for the patient management.      SHARED VISIT NOTE:    Patient is 85 y.o. year old female that presents to the ED for evaluation of left knee pain.  Denies known injury.  Has difficulty walking on it..     Physical Exam    ED Course:    /65 (BP Location: Left arm, Patient Position: Lying)   Pulse 75   Temp 97.4 °F (36.3 °C) (Oral)   Resp 18   Ht 165.1 cm (65\")   Wt 65.4 kg (144 lb 2.9 oz)   SpO2 (!) 81%   BMI 23.99 kg/m²       The following orders were placed and all results were independently analyzed by me:  Orders Placed This Encounter   Procedures    [facility] 06. Hinged Knee (); Left    XR Knee 3 View Left    Ambulatory Referral to Orthopedic Surgery       Medications Given in the Emergency Department:  Medications - No data to display     ED Course:         Labs:    Lab Results (last 24 hours)       ** No results found for the last 24 hours. **             Imaging:    XR Knee 3 View Left  Result Date: 7/19/2025  XR KNEE 3 VW LEFT Date of Exam: 7/19/2025 3:37 PM EDT Indication: left knee pain/swelling Comparison: None available. Findings: Moderate knee joint effusion. No traumatic malalignment. Chondrocalcinosis. Moderate degenerative changes most advanced in the patellofemoral compartment. Articular surfaces appear intact. No evidence of fracture.     Impression: 1.Moderate knee joint effusion without evidence of acute fracture or malalignment. 2.Moderate degenerative changes most advanced in the patellofemoral compartment. Electronically Signed: Prince Mcneal MD  7/19/2025 4:28 PM EDT  Workstation ID: [id number]      MDM:    Negative imaging for acute findings.  Does have chronic findings.  Recommend orthopedic follow-up    Procedures    X-ray were performed in the emergency department and all X-ray impressions were " independently interpreted by me.                     Kyler Lomas MD  17:09 EDT  07/19/25         Kyler Lomas MD  07/19/25 1704

## 2025-07-19 NOTE — ED PROVIDER NOTES
Time: 2:17 PM EDT  Date of encounter:  7/19/2025  Independent Historian/Clinical History and Information was obtained by:   Patient    History is limited by: N/A    Chief Complaint: Knee pain and swelling      History of Present Illness:  Patient is a 85 y.o. year old female who presents to the emergency department for evaluation of pain and swelling of the left knee for the past 4 days.  Patient reports she woke up Tuesday morning with pain in her left knee, which has been worsening since then, states she is unable to walk due to the pain.  Patient denies any recent falls, trauma, or injury, also denies previous injury.  Patient reports she recently had a new dialysis fistula placed on 7/1/2025.  She reports she is on Eliquis and has not missed any doses.      Patient Care Team  Primary Care Provider: Weston Grayson MD    Past Medical History:     Allergies   Allergen Reactions    Nitrofurantoin GI Intolerance    Sulfa Antibiotics Itching    Latex Rash     Past Medical History:   Diagnosis Date    A-fib     FOLLOWS CHALLAPPA/ELIQUIS    Anemia     GERD (gastroesophageal reflux disease)     Heart disease     Hyperlipidemia     Hypertension     Kidney disease     Restless leg      Past Surgical History:   Procedure Laterality Date    APPENDECTOMY      ARTERIOVENOUS FISTULA/SHUNT SURGERY Right 7/1/2025    Procedure: Creation of right brachiocephalic arteriovenous fistula;  Surgeon: Ravin Woodall MD;  Location: [facility] OR;  Service: Vascular;  Laterality: Right;    CARDIAC CATHETERIZATION N/A 1/7/2022    Procedure: Left Heart Cath;  Surgeon: Hitesh Mcguire MD;  Location: [facility] CATH INVASIVE LOCATION;  Service: Cardiovascular;  Laterality: N/A;    CARDIAC CATHETERIZATION N/A 1/7/2022    Procedure: Possible Percutaneous Coronary Intervention;  Surgeon: Hitesh Mcguire MD;  Location: [facility] CATH INVASIVE LOCATION;  Service: Cardiovascular;  Laterality: N/A;    CARDIAC SURGERY       CHOLECYSTECTOMY       Family History   Problem Relation Age of Onset    Malig Hyperthermia Neg Hx        Home Medications:  Prior to Admission medications    Medication Sig Start Date End Date Taking? Authorizing Provider   ALPRAZolam (XANAX) 1 MG tablet Take 1 tablet by mouth Every 12 (Twelve) Hours. 3/25/23   Paresh Dela Cruz MD   amLODIPine (NORVASC) 5 MG tablet Take 1 tablet by mouth Every Night. 7/26/24 7/26/25  Yon Vogt MD   apixaban (Eliquis) 2.5 MG tablet tablet Take 1 tablet by mouth Every 12 (Twelve) Hours. 11/12/24   Rodrigo Carr MD   atenolol (TENORMIN) 50 MG tablet Take 1 tablet by mouth Daily. 11/12/24   Rodrigo Carr MD   cholecalciferol (VITAMIN D3) 1.25 MG (63011 UT) capsule Take 1 capsule by mouth Every 7 (Seven) Days.    Yon Vogt MD   folic acid (FOLVITE) 1 MG tablet Take 1 tablet by mouth Daily.    Yon Vogt MD   furosemide (LASIX) 20 MG tablet Take 1 tablet by mouth Every Other Day. 8/6/24 8/6/25  Yon Vogt MD   HYDROcodone-acetaminophen (NORCO) 7.5-325 MG per tablet Take 1 tablet by mouth Every 6 (Six) Hours As Needed for Moderate Pain.    Yon Vogt MD   naloxone (Narcan) 4 MG/0.1ML nasal spray one spray in one nostril as needed for over sedation or respiratory depression from opioid use. Repeat one spray in alternate nostril every 2-3 minutes until responsive or EMS arrives 10/31/23   Yon Vogt MD   ondansetron ODT (ZOFRAN-ODT) 4 MG disintegrating tablet Place 1 tablet on the tongue Every 8 (Eight) Hours As Needed for Nausea or Vomiting. 12/30/24   Rogers Roche MD   oxyCODONE-acetaminophen (Percocet) 5-325 MG per tablet Take 1 tablet by mouth Every 6 (Six) Hours As Needed for Severe Pain. 7/1/25   Ravin Woodall MD   pantoprazole (PROTONIX) 40 MG EC tablet Take 1 tablet by mouth Daily With Lunch.    Yon Vogt MD   potassium chloride (KLOR-CON M20) 20 MEQ CR tablet Take 0.5 tablets by  "mouth Every Other Day. 8/6/24 8/6/25  Provider, MD Yon   rOPINIRole (REQUIP) 1 MG tablet Take 1 tablet by mouth Every Night.    ProviderYon MD   simvastatin (ZOCOR) 40 MG tablet Take 1 tablet by mouth Every Night. 11/12/24   Rodrigo Carr MD        Social History:   Social History     Tobacco Use    Smoking status: Never    Smokeless tobacco: Former     Types: Chew   Vaping Use    Vaping status: Never Used   Substance Use Topics    Alcohol use: Never    Drug use: Never         Review of Systems:  Review of Systems   Constitutional:  Negative for fever.   HENT:  Negative for sore throat.    Eyes: Negative.    Respiratory:  Negative for cough and shortness of breath.    Cardiovascular:  Negative for chest pain.   Gastrointestinal:  Negative for abdominal pain, diarrhea and vomiting.   Genitourinary:  Negative for dysuria.   Musculoskeletal:  Positive for arthralgias, gait problem and joint swelling. Negative for neck pain.   Skin:  Negative for rash.   Allergic/Immunologic: Negative.    Neurological:  Negative for weakness, numbness and headaches.   Hematological: Negative.    Psychiatric/Behavioral: Negative.     All other systems reviewed and are negative.       Physical Exam:  /65 (BP Location: Left arm, Patient Position: Lying)   Pulse 70   Temp 97.4 °F (36.3 °C) (Oral)   Resp 18   Ht 165.1 cm (65\")   Wt 65.4 kg (144 lb 2.9 oz)   SpO2 97%   BMI 23.99 kg/m²     Physical Exam  Vitals and nursing note reviewed.   Constitutional:       Appearance: Normal appearance. She is not ill-appearing or toxic-appearing.   HENT:      Head: Normocephalic.      Nose: Nose normal.   Eyes:      Extraocular Movements: Extraocular movements intact.      Conjunctiva/sclera: Conjunctivae normal.      Pupils: Pupils are equal, round, and reactive to light.   Cardiovascular:      Rate and Rhythm: Normal rate.      Pulses: Normal pulses.   Pulmonary:      Effort: Pulmonary effort is normal. "   Abdominal:      General: Abdomen is flat. There is no distension.      Palpations: Abdomen is soft.   Musculoskeletal:      Cervical back: Normal range of motion and neck supple.      Left knee: Swelling present. No deformity or erythema. Decreased range of motion. Tenderness (anterior knee, prepatellar and suprapatellar tenderness) present. Normal pulse.   Skin:     General: Skin is warm and dry.      Capillary Refill: Capillary refill takes less than 2 seconds.   Neurological:      General: No focal deficit present.      Mental Status: She is alert and oriented to person, place, and time.   Psychiatric:         Mood and Affect: Mood normal.            Medical Decision Making:      Comorbidities that affect care:    Atrial Fibrillation, Chronic Kidney Disease, Coronary Artery Disease, Hypertension    External Notes reviewed:    Previous Clinic Note: Vascular surgery office visit note from 7/16/2025 where she was seen for follow-up after fistula creation, it appears patient also reported pain and swelling in her knee during this visit that was affecting her mobility, she was advised to monitor the symptoms and use a cane if necessary to aid in walking      The following orders were placed and all results were independently analyzed by me:  Orders Placed This Encounter   Procedures    [facility] 06. Hinged Knee (); Left    XR Knee 3 View Left    Ambulatory Referral to Orthopedic Surgery       Medications Given in the Emergency Department:  Medications - No data to display     ED Course:         Labs:    Lab Results (last 24 hours)       ** No results found for the last 24 hours. **             Imaging:    XR Knee 3 View Left  Result Date: 7/19/2025  XR KNEE 3 VW LEFT Date of Exam: 7/19/2025 3:37 PM EDT Indication: left knee pain/swelling Comparison: None available. Findings: Moderate knee joint effusion. No traumatic malalignment. Chondrocalcinosis. Moderate degenerative changes most advanced in the  patellofemoral compartment. Articular surfaces appear intact. No evidence of fracture.     Impression: 1.Moderate knee joint effusion without evidence of acute fracture or malalignment. 2.Moderate degenerative changes most advanced in the patellofemoral compartment. Electronically Signed: Prince Mcneal MD  7/19/2025 4:28 PM EDT  Workstation ID: [id number]        Differential Diagnosis and Discussion:    Extremity Pain: Differential diagnosis includes but is not limited to soft tissue sprain, tendonitis, tendon injury, dislocation, fracture, deep vein thrombosis, arterial insufficiency, osteoarthritis, bursitis, and ligamentous damage.  Orthopedic Injuries: Differential diagnosis includes but is not limited to fractures, soft tissue injuries, dislocations, contusions, ligamentous injuries, tendon injuries, nerve injuries, compartment syndrome, bursitis, and vascular injuries.    PROCEDURES:    X-ray were performed in the emergency department and all X-ray impressions were independently interpreted by me.    No orders to display       Procedures    MDM     Amount and/or Complexity of Data Reviewed  Tests in the radiology section of CPT®: reviewed                       Patient Care Considerations:    CONSULT: I considered consulting orthopedics, however no acute complications.      Consultants/Shared Management Plan:    SHARED VISIT: I have discussed the case with my supervising physician, Dr. Lomas who states that he agrees with plan of care. The substantive portion of the medical decision was made by the attesting physician who made or approve the management plan and will take responsibility for the patient.  Clinical findings were discussed and ultimate disposition was made in consult with supervising physician.    Social Determinants of Health:    Patient is independent, reliable, and has access to care.       Disposition and Care Coordination:    Discharged: The patient is suitable and stable for discharge  with no need for consideration of admission.    I have explained the patient´s condition, diagnoses and treatment plan based on the information available to me at this time. I have answered questions and addressed any concerns. The patient has a good  understanding of the patient´s diagnosis, condition, and treatment plan as can be expected at this point. The vital signs have been stable. The patient´s condition is stable and appropriate for discharge from the emergency department.      The patient will pursue further outpatient evaluation with the primary care physician or other designated or consulting physician as outlined in the discharge instructions. They are agreeable to this plan of care and follow-up instructions have been explained in detail. The patient has received these instructions in written format and has expressed an understanding of the discharge instructions. The patient is aware that any significant change in condition or worsening of symptoms should prompt an immediate return to this or the closest emergency department or call to 911.  I have explained discharge medications and the need for follow up with the patient/caretakers. This was also printed in the discharge instructions. Patient was discharged with the following medications and follow up:      Medication List        New Prescriptions      Diclofenac Sodium 1 % gel gel  Commonly known as: VOLTAREN  Apply 4 g topically to the appropriate area as directed 4 (Four) Times a Day As Needed (left knee pain).               Where to Get Your Medications        These medications were sent to [facility] [id number] - [place], [place] - [address] AT [facility] [address] - [phone] [facility] [phone] [address]  [address] [address] [place] [address]      Phone: [phone]   Diclofenac Sodium 1 % gel gel      Weston Grayson MD  [address] [facility] [address] [facility] [address] [phone]          Edgardo North MD  [address] [facility]  [place]  [place] [address]  [phone]             Final diagnoses:   Pain and swelling of left knee        ED Disposition       ED Disposition   Discharge    Condition   Stable    Comment   --               This medical record created using voice recognition software.             Mckenzie Santos, APRN  07/19/25 [id number]

## 2025-07-19 NOTE — DISCHARGE INSTRUCTIONS
Remember to use the word RICE to help guide treatment for pain and swelling.  Rest, ice, compression, elevation.  Keep the knee elevated whenever possible.  Apply ice packs to your sore and swollen areas for 15 to 20 minutes at a time multiple times per day.  Continue to use the knee brace provided to help provide compression and support.  A referral has been sent to orthopedics, and their office will contact you to set up an appointment.

## 2025-08-11 ENCOUNTER — OFFICE VISIT (OUTPATIENT)
Dept: ORTHOPEDIC SURGERY | Facility: CLINIC | Age: 85
End: 2025-08-11
Payer: MEDICARE

## 2025-08-11 VITALS
HEIGHT: 65 IN | SYSTOLIC BLOOD PRESSURE: 129 MMHG | BODY MASS INDEX: 23.99 KG/M2 | OXYGEN SATURATION: 96 % | WEIGHT: 144 LBS | HEART RATE: 78 BPM | DIASTOLIC BLOOD PRESSURE: 74 MMHG

## 2025-08-11 DIAGNOSIS — M17.12 PRIMARY OSTEOARTHRITIS OF LEFT KNEE: Primary | ICD-10-CM

## 2025-08-11 RX ORDER — LIDOCAINE HYDROCHLORIDE 10 MG/ML
5 INJECTION, SOLUTION INFILTRATION; PERINEURAL
Status: COMPLETED | OUTPATIENT
Start: 2025-08-11 | End: 2025-08-11

## 2025-08-11 RX ADMIN — LIDOCAINE HYDROCHLORIDE 5 ML: 10 INJECTION, SOLUTION INFILTRATION; PERINEURAL at 13:29

## 2025-08-13 ENCOUNTER — OFFICE VISIT (OUTPATIENT)
Age: 85
End: 2025-08-13
Payer: MEDICARE

## 2025-08-13 VITALS
RESPIRATION RATE: 18 BRPM | OXYGEN SATURATION: 95 % | HEART RATE: 82 BPM | DIASTOLIC BLOOD PRESSURE: 71 MMHG | SYSTOLIC BLOOD PRESSURE: 132 MMHG

## 2025-08-13 DIAGNOSIS — N18.4 CKD (CHRONIC KIDNEY DISEASE) STAGE 4, GFR 15-29 ML/MIN: Primary | ICD-10-CM

## 2025-08-13 DIAGNOSIS — Z98.890 STATUS POST CREATION OF ARTERIOVENOUS FISTULA: ICD-10-CM

## 2025-08-17 ENCOUNTER — APPOINTMENT (OUTPATIENT)
Dept: CT IMAGING | Facility: HOSPITAL | Age: 85
End: 2025-08-17
Payer: MEDICARE

## 2025-08-17 ENCOUNTER — APPOINTMENT (OUTPATIENT)
Dept: GENERAL RADIOLOGY | Facility: HOSPITAL | Age: 85
End: 2025-08-17
Payer: MEDICARE

## 2025-08-17 ENCOUNTER — HOSPITAL ENCOUNTER (EMERGENCY)
Facility: HOSPITAL | Age: 85
Discharge: HOME | End: 2025-08-17
Attending: EMERGENCY MEDICINE | Admitting: EMERGENCY MEDICINE
Payer: MEDICARE

## 2025-08-17 VITALS
DIASTOLIC BLOOD PRESSURE: 70 MMHG | RESPIRATION RATE: 21 BRPM | HEART RATE: 79 BPM | SYSTOLIC BLOOD PRESSURE: 120 MMHG | OXYGEN SATURATION: 92 % | HEIGHT: 65 IN | BODY MASS INDEX: 23.99 KG/M2 | WEIGHT: 143.96 LBS | TEMPERATURE: 97.4 F

## 2025-08-17 DIAGNOSIS — J18.9 PNEUMONIA DUE TO INFECTIOUS ORGANISM, UNSPECIFIED LATERALITY, UNSPECIFIED PART OF LUNG: Primary | ICD-10-CM

## 2025-08-17 LAB
ALBUMIN SERPL-MCNC: 4.2 G/DL (ref 3.5–5.2)
ALBUMIN/GLOB SERPL: 1.4 G/DL
ALP SERPL-CCNC: 110 U/L (ref 39–117)
ALT SERPL W P-5'-P-CCNC: 14 U/L (ref 1–33)
ANION GAP SERPL CALCULATED.3IONS-SCNC: 11.4 MMOL/L (ref 5–15)
AST SERPL-CCNC: 21 U/L (ref 1–32)
BACTERIA UR QL AUTO: ABNORMAL /HPF
BASOPHILS # BLD AUTO: 0.03 10*3/MM3 (ref 0–0.2)
BASOPHILS NFR BLD AUTO: 0.5 % (ref 0–1.5)
BILIRUB SERPL-MCNC: 0.7 MG/DL (ref 0–1.2)
BILIRUB UR QL STRIP: NEGATIVE
BUN SERPL-MCNC: 42.5 MG/DL (ref 8–23)
BUN/CREAT SERPL: 19.1 (ref 7–25)
CALCIUM SPEC-SCNC: 9.5 MG/DL (ref 8.6–10.5)
CHLORIDE SERPL-SCNC: 106 MMOL/L (ref 98–107)
CLARITY UR: CLEAR
CO2 SERPL-SCNC: 21.6 MMOL/L (ref 22–29)
COLOR UR: YELLOW
CREAT SERPL-MCNC: 2.22 MG/DL (ref 0.57–1)
D-LACTATE SERPL-SCNC: 1.1 MMOL/L (ref 0.5–2)
DEPRECATED RDW RBC AUTO: 50 FL (ref 37–54)
EGFRCR SERPLBLD CKD-EPI 2021: 21.1 ML/MIN/1.73
EOSINOPHIL # BLD AUTO: 0.06 10*3/MM3 (ref 0–0.4)
EOSINOPHIL NFR BLD AUTO: 1.1 % (ref 0.3–6.2)
ERYTHROCYTE [DISTWIDTH] IN BLOOD BY AUTOMATED COUNT: 20.6 % (ref 12.3–15.4)
FLUAV RNA RESP QL NAA+PROBE: NOT DETECTED
FLUBV RNA NPH QL NAA+NON-PROBE: NOT DETECTED
GLOBULIN UR ELPH-MCNC: 3 GM/DL
GLUCOSE SERPL-MCNC: 96 MG/DL (ref 65–99)
GLUCOSE UR STRIP-MCNC: NEGATIVE MG/DL
HCT VFR BLD AUTO: 28.2 % (ref 34–46.6)
HGB BLD-MCNC: 11.4 G/DL (ref 12–15.9)
HGB UR QL STRIP.AUTO: ABNORMAL
HOLD SPECIMEN: NORMAL
HOLD SPECIMEN: NORMAL
HYALINE CASTS UR QL AUTO: ABNORMAL /LPF
IMM GRANULOCYTES # BLD AUTO: 0.03 10*3/MM3 (ref 0–0.05)
IMM GRANULOCYTES NFR BLD AUTO: 0.5 % (ref 0–0.5)
KETONES UR QL STRIP: NEGATIVE
LEUKOCYTE ESTERASE UR QL STRIP.AUTO: NEGATIVE
LIPASE SERPL-CCNC: 56 U/L (ref 13–60)
LYMPHOCYTES # BLD AUTO: 1.41 10*3/MM3 (ref 0.7–3.1)
LYMPHOCYTES NFR BLD AUTO: 24.9 % (ref 19.6–45.3)
MCH RBC QN AUTO: 43.7 PG (ref 26.6–33)
MCHC RBC AUTO-ENTMCNC: 40.4 G/DL (ref 31.5–35.7)
MCV RBC AUTO: 108 FL (ref 79–97)
MONOCYTES # BLD AUTO: 0.66 10*3/MM3 (ref 0.1–0.9)
MONOCYTES NFR BLD AUTO: 11.7 % (ref 5–12)
NEUTROPHILS NFR BLD AUTO: 3.47 10*3/MM3 (ref 1.7–7)
NEUTROPHILS NFR BLD AUTO: 61.3 % (ref 42.7–76)
NITRITE UR QL STRIP: NEGATIVE
NRBC BLD AUTO-RTO: 0 /100 WBC (ref 0–0.2)
PH UR STRIP.AUTO: 5.5 [PH] (ref 5–8)
PLATELET # BLD AUTO: 203 10*3/MM3 (ref 140–450)
PMV BLD AUTO: 10 FL (ref 6–12)
POTASSIUM SERPL-SCNC: 4 MMOL/L (ref 3.5–5.2)
PROT SERPL-MCNC: 7.2 G/DL (ref 6–8.5)
PROT UR QL STRIP: NEGATIVE
RBC # BLD AUTO: 2.61 10*6/MM3 (ref 3.77–5.28)
RBC # UR STRIP: ABNORMAL /HPF
REF LAB TEST METHOD: ABNORMAL
RSV RNA RESP QL NAA+PROBE: NOT DETECTED
SARS-COV-2 RNA RESP QL NAA+PROBE: NOT DETECTED
SODIUM SERPL-SCNC: 139 MMOL/L (ref 136–145)
SP GR UR STRIP: 1.01 (ref 1–1.03)
SQUAMOUS #/AREA URNS HPF: ABNORMAL /HPF
UROBILINOGEN UR QL STRIP: ABNORMAL
WBC # UR STRIP: ABNORMAL /HPF
WBC NRBC COR # BLD AUTO: 5.66 10*3/MM3 (ref 3.4–10.8)
WHOLE BLOOD HOLD COAG: NORMAL
WHOLE BLOOD HOLD SPECIMEN: NORMAL

## 2025-08-17 PROCEDURE — 99284 EMERGENCY DEPT VISIT MOD MDM: CPT

## 2025-08-17 PROCEDURE — 74176 CT ABD & PELVIS W/O CONTRAST: CPT

## 2025-08-17 PROCEDURE — 80053 COMPREHEN METABOLIC PANEL: CPT | Performed by: EMERGENCY MEDICINE

## 2025-08-17 PROCEDURE — 85025 COMPLETE CBC W/AUTO DIFF WBC: CPT | Performed by: EMERGENCY MEDICINE

## 2025-08-17 PROCEDURE — 71045 X-RAY EXAM CHEST 1 VIEW: CPT

## 2025-08-17 PROCEDURE — 25010000002 ONDANSETRON PER 1 MG

## 2025-08-17 PROCEDURE — 83690 ASSAY OF LIPASE: CPT | Performed by: EMERGENCY MEDICINE

## 2025-08-17 PROCEDURE — 25010000002 CEFTRIAXONE PER 250 MG

## 2025-08-17 PROCEDURE — 87637 SARSCOV2&INF A&B&RSV AMP PRB: CPT

## 2025-08-17 PROCEDURE — 83605 ASSAY OF LACTIC ACID: CPT | Performed by: EMERGENCY MEDICINE

## 2025-08-17 PROCEDURE — 96375 TX/PRO/DX INJ NEW DRUG ADDON: CPT

## 2025-08-17 PROCEDURE — 81001 URINALYSIS AUTO W/SCOPE: CPT | Performed by: EMERGENCY MEDICINE

## 2025-08-17 PROCEDURE — 25010000002 MORPHINE PER 10 MG: Performed by: EMERGENCY MEDICINE

## 2025-08-17 PROCEDURE — 96365 THER/PROPH/DIAG IV INF INIT: CPT

## 2025-08-17 RX ORDER — MORPHINE SULFATE 2 MG/ML
2 INJECTION, SOLUTION INTRAMUSCULAR; INTRAVENOUS ONCE
Status: COMPLETED | OUTPATIENT
Start: 2025-08-17 | End: 2025-08-17

## 2025-08-17 RX ORDER — CEFUROXIME AXETIL 500 MG/1
500 TABLET ORAL 2 TIMES DAILY
Qty: 14 TABLET | Refills: 0 | Status: ON HOLD | OUTPATIENT
Start: 2025-08-17 | End: 2025-08-24

## 2025-08-17 RX ORDER — ONDANSETRON 2 MG/ML
4 INJECTION INTRAMUSCULAR; INTRAVENOUS ONCE
Status: COMPLETED | OUTPATIENT
Start: 2025-08-17 | End: 2025-08-17

## 2025-08-17 RX ORDER — SODIUM CHLORIDE 0.9 % (FLUSH) 0.9 %
10 SYRINGE (ML) INJECTION AS NEEDED
Status: DISCONTINUED | OUTPATIENT
Start: 2025-08-17 | End: 2025-08-17 | Stop reason: HOSPADM

## 2025-08-17 RX ADMIN — ONDANSETRON 4 MG: 2 INJECTION, SOLUTION INTRAMUSCULAR; INTRAVENOUS at 18:42

## 2025-08-17 RX ADMIN — MORPHINE SULFATE 2 MG: 2 INJECTION, SOLUTION INTRAMUSCULAR; INTRAVENOUS at 18:42

## 2025-08-17 RX ADMIN — CEFTRIAXONE SODIUM 1000 MG: 1 INJECTION, POWDER, FOR SOLUTION INTRAMUSCULAR; INTRAVENOUS at 21:14

## 2025-08-22 ENCOUNTER — APPOINTMENT (OUTPATIENT)
Dept: CT IMAGING | Facility: HOSPITAL | Age: 85
End: 2025-08-22
Payer: MEDICARE

## 2025-08-22 ENCOUNTER — HOSPITAL ENCOUNTER (INPATIENT)
Facility: HOSPITAL | Age: 85
LOS: 4 days | Discharge: HOME | End: 2025-08-26
Attending: EMERGENCY MEDICINE | Admitting: FAMILY MEDICINE
Payer: MEDICARE

## 2025-08-22 ENCOUNTER — APPOINTMENT (OUTPATIENT)
Dept: GENERAL RADIOLOGY | Facility: HOSPITAL | Age: 85
End: 2025-08-22
Payer: MEDICARE

## 2025-08-22 DIAGNOSIS — J90 PLEURAL EFFUSION, BILATERAL: ICD-10-CM

## 2025-08-22 DIAGNOSIS — R26.2 DIFFICULTY WALKING: ICD-10-CM

## 2025-08-22 DIAGNOSIS — Z74.09 IMPAIRED MOBILITY AND ADLS: ICD-10-CM

## 2025-08-22 DIAGNOSIS — E87.5 HYPERKALEMIA: ICD-10-CM

## 2025-08-22 DIAGNOSIS — Z78.9 IMPAIRED MOBILITY AND ADLS: ICD-10-CM

## 2025-08-22 DIAGNOSIS — E87.70 HYPERVOLEMIA, UNSPECIFIED HYPERVOLEMIA TYPE: Primary | ICD-10-CM

## 2025-08-22 LAB
ALBUMIN SERPL-MCNC: 4.3 G/DL (ref 3.5–5.2)
ALBUMIN/GLOB SERPL: 1.3 G/DL
ALP SERPL-CCNC: 114 U/L (ref 39–117)
ALT SERPL W P-5'-P-CCNC: 22 U/L (ref 1–33)
ANION GAP SERPL CALCULATED.3IONS-SCNC: 11.6 MMOL/L (ref 5–15)
AST SERPL-CCNC: 34 U/L (ref 1–32)
BACTERIA UR QL AUTO: NORMAL /HPF
BASOPHILS # BLD AUTO: 0.03 10*3/MM3 (ref 0–0.2)
BASOPHILS NFR BLD AUTO: 0.5 % (ref 0–1.5)
BILIRUB SERPL-MCNC: 1.4 MG/DL (ref 0–1.2)
BILIRUB UR QL STRIP: NEGATIVE
BUN SERPL-MCNC: 40.1 MG/DL (ref 8–23)
BUN/CREAT SERPL: 21.1 (ref 7–25)
CALCIUM SPEC-SCNC: 9.3 MG/DL (ref 8.6–10.5)
CHLORIDE SERPL-SCNC: 107 MMOL/L (ref 98–107)
CLARITY UR: CLEAR
CO2 SERPL-SCNC: 19.4 MMOL/L (ref 22–29)
COLOR UR: YELLOW
CREAT SERPL-MCNC: 1.9 MG/DL (ref 0.57–1)
D-LACTATE SERPL-SCNC: 1.3 MMOL/L (ref 0.5–2)
DEPRECATED RDW RBC AUTO: 50.5 FL (ref 37–54)
EGFRCR SERPLBLD CKD-EPI 2021: 25.5 ML/MIN/1.73
EOSINOPHIL # BLD AUTO: 0.07 10*3/MM3 (ref 0–0.4)
EOSINOPHIL NFR BLD AUTO: 1.2 % (ref 0.3–6.2)
ERYTHROCYTE [DISTWIDTH] IN BLOOD BY AUTOMATED COUNT: 21.5 % (ref 12.3–15.4)
GEN 5 1HR TROPONIN T REFLEX: 29 NG/L
GLOBULIN UR ELPH-MCNC: 3.3 GM/DL
GLUCOSE SERPL-MCNC: 94 MG/DL (ref 65–99)
GLUCOSE UR STRIP-MCNC: NEGATIVE MG/DL
HCT VFR BLD AUTO: 27.3 % (ref 34–46.6)
HGB BLD-MCNC: 12 G/DL (ref 12–15.9)
HGB UR QL STRIP.AUTO: ABNORMAL
HOLD SPECIMEN: NORMAL
HOLD SPECIMEN: NORMAL
HYALINE CASTS UR QL AUTO: NORMAL /LPF
IMM GRANULOCYTES # BLD AUTO: 0.02 10*3/MM3 (ref 0–0.05)
IMM GRANULOCYTES NFR BLD AUTO: 0.3 % (ref 0–0.5)
KETONES UR QL STRIP: NEGATIVE
LEUKOCYTE ESTERASE UR QL STRIP.AUTO: ABNORMAL
LIPASE SERPL-CCNC: 58 U/L (ref 13–60)
LYMPHOCYTES # BLD AUTO: 1.4 10*3/MM3 (ref 0.7–3.1)
LYMPHOCYTES NFR BLD AUTO: 23.8 % (ref 19.6–45.3)
MCH RBC QN AUTO: 46.9 PG (ref 26.6–33)
MCHC RBC AUTO-ENTMCNC: 44 G/DL (ref 31.5–35.7)
MCV RBC AUTO: 106.6 FL (ref 79–97)
MONOCYTES # BLD AUTO: 0.52 10*3/MM3 (ref 0.1–0.9)
MONOCYTES NFR BLD AUTO: 8.8 % (ref 5–12)
NEUTROPHILS NFR BLD AUTO: 3.84 10*3/MM3 (ref 1.7–7)
NEUTROPHILS NFR BLD AUTO: 65.4 % (ref 42.7–76)
NITRITE UR QL STRIP: NEGATIVE
NRBC BLD AUTO-RTO: 0 /100 WBC (ref 0–0.2)
NT-PROBNP SERPL-MCNC: ABNORMAL PG/ML (ref 0–1800)
PATHOLOGY REVIEW: YES
PH UR STRIP.AUTO: <=5 [PH] (ref 5–8)
PLAT MORPH BLD: NORMAL
PLATELET # BLD AUTO: 223 10*3/MM3 (ref 140–450)
PMV BLD AUTO: 9.6 FL (ref 6–12)
POTASSIUM SERPL-SCNC: 6.3 MMOL/L (ref 3.5–5.2)
PROT SERPL-MCNC: 7.6 G/DL (ref 6–8.5)
PROT UR QL STRIP: NEGATIVE
QT INTERVAL: 490 MS
QTC INTERVAL: 521 MS
RBC # BLD AUTO: 2.56 10*6/MM3 (ref 3.77–5.28)
RBC # UR STRIP: NORMAL /HPF
RBC AGGLUT BLD QL: NORMAL
REF LAB TEST METHOD: NORMAL
SODIUM SERPL-SCNC: 138 MMOL/L (ref 136–145)
SP GR UR STRIP: 1.01 (ref 1–1.03)
SQUAMOUS #/AREA URNS HPF: NORMAL /HPF
TROPONIN T % DELTA: -3
TROPONIN T NUMERIC DELTA: -1 NG/L
TROPONIN T SERPL HS-MCNC: 30 NG/L
UROBILINOGEN UR QL STRIP: ABNORMAL
WBC # UR STRIP: NORMAL /HPF
WBC MORPH BLD: NORMAL
WBC NRBC COR # BLD AUTO: 5.88 10*3/MM3 (ref 3.4–10.8)
WHOLE BLOOD HOLD COAG: NORMAL
WHOLE BLOOD HOLD SPECIMEN: NORMAL

## 2025-08-22 PROCEDURE — 83880 ASSAY OF NATRIURETIC PEPTIDE: CPT | Performed by: EMERGENCY MEDICINE

## 2025-08-22 PROCEDURE — 74176 CT ABD & PELVIS W/O CONTRAST: CPT

## 2025-08-22 PROCEDURE — 81001 URINALYSIS AUTO W/SCOPE: CPT | Performed by: EMERGENCY MEDICINE

## 2025-08-22 PROCEDURE — 83690 ASSAY OF LIPASE: CPT | Performed by: EMERGENCY MEDICINE

## 2025-08-22 PROCEDURE — 93010 ELECTROCARDIOGRAM REPORT: CPT | Performed by: INTERNAL MEDICINE

## 2025-08-22 PROCEDURE — 80053 COMPREHEN METABOLIC PANEL: CPT | Performed by: EMERGENCY MEDICINE

## 2025-08-22 PROCEDURE — 85025 COMPLETE CBC W/AUTO DIFF WBC: CPT | Performed by: EMERGENCY MEDICINE

## 2025-08-22 PROCEDURE — 36415 COLL VENOUS BLD VENIPUNCTURE: CPT

## 2025-08-22 PROCEDURE — 25010000002 FUROSEMIDE PER 20 MG: Performed by: EMERGENCY MEDICINE

## 2025-08-22 PROCEDURE — 99223 1ST HOSP IP/OBS HIGH 75: CPT | Performed by: FAMILY MEDICINE

## 2025-08-22 PROCEDURE — 99285 EMERGENCY DEPT VISIT HI MDM: CPT

## 2025-08-22 PROCEDURE — 85007 BL SMEAR W/DIFF WBC COUNT: CPT | Performed by: EMERGENCY MEDICINE

## 2025-08-22 PROCEDURE — 93005 ELECTROCARDIOGRAM TRACING: CPT | Performed by: EMERGENCY MEDICINE

## 2025-08-22 PROCEDURE — 71250 CT THORAX DX C-: CPT

## 2025-08-22 PROCEDURE — 83605 ASSAY OF LACTIC ACID: CPT | Performed by: EMERGENCY MEDICINE

## 2025-08-22 PROCEDURE — 84484 ASSAY OF TROPONIN QUANT: CPT | Performed by: EMERGENCY MEDICINE

## 2025-08-22 RX ORDER — SODIUM CHLORIDE 0.9 % (FLUSH) 0.9 %
10 SYRINGE (ML) INJECTION AS NEEDED
Status: DISCONTINUED | OUTPATIENT
Start: 2025-08-22 | End: 2025-08-26 | Stop reason: HOSPADM

## 2025-08-22 RX ORDER — FUROSEMIDE 10 MG/ML
60 INJECTION INTRAMUSCULAR; INTRAVENOUS ONCE
Status: COMPLETED | OUTPATIENT
Start: 2025-08-22 | End: 2025-08-22

## 2025-08-22 RX ORDER — BUMETANIDE 0.25 MG/ML
1 INJECTION, SOLUTION INTRAMUSCULAR; INTRAVENOUS EVERY 12 HOURS
Status: DISCONTINUED | OUTPATIENT
Start: 2025-08-23 | End: 2025-08-23

## 2025-08-22 RX ADMIN — FUROSEMIDE 60 MG: 10 INJECTION, SOLUTION INTRAMUSCULAR; INTRAVENOUS at 22:41

## 2025-08-22 RX ADMIN — SODIUM ZIRCONIUM CYCLOSILICATE 10 G: 10 POWDER, FOR SUSPENSION ORAL at 22:41

## 2025-08-23 PROBLEM — R06.09 DYSPNEA ON EXERTION: Status: ACTIVE | Noted: 2025-08-23

## 2025-08-23 PROBLEM — E87.70 HYPERVOLEMIA: Status: ACTIVE | Noted: 2025-08-23

## 2025-08-23 PROBLEM — R53.1 GENERALIZED WEAKNESS: Status: ACTIVE | Noted: 2025-08-23

## 2025-08-23 LAB
ANION GAP SERPL CALCULATED.3IONS-SCNC: 11 MMOL/L (ref 5–15)
BUN SERPL-MCNC: 39.9 MG/DL (ref 8–23)
BUN/CREAT SERPL: 21.6 (ref 7–25)
CALCIUM SPEC-SCNC: 9.6 MG/DL (ref 8.6–10.5)
CHLORIDE SERPL-SCNC: 106 MMOL/L (ref 98–107)
CO2 SERPL-SCNC: 25 MMOL/L (ref 22–29)
CREAT SERPL-MCNC: 1.85 MG/DL (ref 0.57–1)
DEPRECATED RDW RBC AUTO: 50.4 FL (ref 37–54)
EGFRCR SERPLBLD CKD-EPI 2021: 26.3 ML/MIN/1.73
ERYTHROCYTE [DISTWIDTH] IN BLOOD BY AUTOMATED COUNT: 18.4 % (ref 12.3–15.4)
GLUCOSE SERPL-MCNC: 150 MG/DL (ref 65–99)
HCT VFR BLD AUTO: 31 % (ref 34–46.6)
HGB BLD-MCNC: 11.9 G/DL (ref 12–15.9)
MCH RBC QN AUTO: 39.9 PG (ref 26.6–33)
MCHC RBC AUTO-ENTMCNC: 38.4 G/DL (ref 31.5–35.7)
MCV RBC AUTO: 104 FL (ref 79–97)
PLATELET # BLD AUTO: 211 10*3/MM3 (ref 140–450)
PMV BLD AUTO: 9.3 FL (ref 6–12)
POTASSIUM SERPL-SCNC: 3.5 MMOL/L (ref 3.5–5.2)
RBC # BLD AUTO: 2.98 10*6/MM3 (ref 3.77–5.28)
SODIUM SERPL-SCNC: 142 MMOL/L (ref 136–145)
WBC NRBC COR # BLD AUTO: 6.49 10*3/MM3 (ref 3.4–10.8)

## 2025-08-23 PROCEDURE — 99232 SBSQ HOSP IP/OBS MODERATE 35: CPT | Performed by: INTERNAL MEDICINE

## 2025-08-23 PROCEDURE — 80048 BASIC METABOLIC PNL TOTAL CA: CPT | Performed by: FAMILY MEDICINE

## 2025-08-23 PROCEDURE — 99223 1ST HOSP IP/OBS HIGH 75: CPT | Performed by: STUDENT IN AN ORGANIZED HEALTH CARE EDUCATION/TRAINING PROGRAM

## 2025-08-23 PROCEDURE — 85027 COMPLETE CBC AUTOMATED: CPT | Performed by: FAMILY MEDICINE

## 2025-08-23 PROCEDURE — 25010000002 BUMETANIDE PER 0.5 MG: Performed by: INTERNAL MEDICINE

## 2025-08-23 RX ORDER — BISACODYL 5 MG/1
5 TABLET, DELAYED RELEASE ORAL DAILY PRN
Status: DISCONTINUED | OUTPATIENT
Start: 2025-08-23 | End: 2025-08-26 | Stop reason: HOSPADM

## 2025-08-23 RX ORDER — BUMETANIDE 0.25 MG/ML
3 INJECTION, SOLUTION INTRAMUSCULAR; INTRAVENOUS EVERY 6 HOURS
Status: DISCONTINUED | OUTPATIENT
Start: 2025-08-23 | End: 2025-08-23

## 2025-08-23 RX ORDER — SODIUM CHLORIDE 9 MG/ML
40 INJECTION, SOLUTION INTRAVENOUS AS NEEDED
Status: DISCONTINUED | OUTPATIENT
Start: 2025-08-23 | End: 2025-08-26 | Stop reason: HOSPADM

## 2025-08-23 RX ORDER — AMOXICILLIN 250 MG
2 CAPSULE ORAL 2 TIMES DAILY PRN
Status: DISCONTINUED | OUTPATIENT
Start: 2025-08-23 | End: 2025-08-26 | Stop reason: HOSPADM

## 2025-08-23 RX ORDER — ATORVASTATIN CALCIUM 20 MG/1
20 TABLET, FILM COATED ORAL DAILY
Status: DISCONTINUED | OUTPATIENT
Start: 2025-08-23 | End: 2025-08-26 | Stop reason: HOSPADM

## 2025-08-23 RX ORDER — SODIUM CHLORIDE 0.9 % (FLUSH) 0.9 %
10 SYRINGE (ML) INJECTION EVERY 12 HOURS SCHEDULED
Status: DISCONTINUED | OUTPATIENT
Start: 2025-08-23 | End: 2025-08-26 | Stop reason: HOSPADM

## 2025-08-23 RX ORDER — ERGOCALCIFEROL 1.25 MG/1
50000 CAPSULE, LIQUID FILLED ORAL
COMMUNITY
Start: 2025-07-31

## 2025-08-23 RX ORDER — BISACODYL 10 MG
10 SUPPOSITORY, RECTAL RECTAL DAILY PRN
Status: DISCONTINUED | OUTPATIENT
Start: 2025-08-23 | End: 2025-08-26 | Stop reason: HOSPADM

## 2025-08-23 RX ORDER — HYDROCODONE BITARTRATE AND ACETAMINOPHEN 7.5; 325 MG/1; MG/1
1 TABLET ORAL EVERY 6 HOURS PRN
COMMUNITY

## 2025-08-23 RX ORDER — ALPRAZOLAM 0.25 MG
1 TABLET ORAL EVERY 12 HOURS PRN
Status: DISCONTINUED | OUTPATIENT
Start: 2025-08-23 | End: 2025-08-26 | Stop reason: HOSPADM

## 2025-08-23 RX ORDER — SODIUM CHLORIDE 0.9 % (FLUSH) 0.9 %
10 SYRINGE (ML) INJECTION AS NEEDED
Status: DISCONTINUED | OUTPATIENT
Start: 2025-08-23 | End: 2025-08-26 | Stop reason: HOSPADM

## 2025-08-23 RX ORDER — FUROSEMIDE 20 MG/1
20 TABLET ORAL DAILY
Status: ON HOLD | COMMUNITY
End: 2025-08-26

## 2025-08-23 RX ORDER — POLYETHYLENE GLYCOL 3350 17 G/17G
17 POWDER, FOR SOLUTION ORAL DAILY PRN
Status: DISCONTINUED | OUTPATIENT
Start: 2025-08-23 | End: 2025-08-26 | Stop reason: HOSPADM

## 2025-08-23 RX ORDER — BUMETANIDE 0.25 MG/ML
3 INJECTION, SOLUTION INTRAMUSCULAR; INTRAVENOUS EVERY 8 HOURS
Status: COMPLETED | OUTPATIENT
Start: 2025-08-24 | End: 2025-08-24

## 2025-08-23 RX ORDER — BUMETANIDE 0.25 MG/ML
3 INJECTION, SOLUTION INTRAMUSCULAR; INTRAVENOUS EVERY 6 HOURS
Status: COMPLETED | OUTPATIENT
Start: 2025-08-23 | End: 2025-08-23

## 2025-08-23 RX ORDER — ONDANSETRON 2 MG/ML
4 INJECTION INTRAMUSCULAR; INTRAVENOUS EVERY 6 HOURS PRN
Status: DISCONTINUED | OUTPATIENT
Start: 2025-08-23 | End: 2025-08-26 | Stop reason: HOSPADM

## 2025-08-23 RX ORDER — ROPINIROLE 1 MG/1
1 TABLET, FILM COATED ORAL NIGHTLY
Status: DISCONTINUED | OUTPATIENT
Start: 2025-08-23 | End: 2025-08-26 | Stop reason: HOSPADM

## 2025-08-23 RX ORDER — POTASSIUM CHLORIDE 750 MG/1
10 TABLET, EXTENDED RELEASE ORAL DAILY
COMMUNITY
End: 2025-08-26 | Stop reason: HOSPADM

## 2025-08-23 RX ORDER — PANTOPRAZOLE SODIUM 40 MG/1
40 TABLET, DELAYED RELEASE ORAL
Status: DISCONTINUED | OUTPATIENT
Start: 2025-08-23 | End: 2025-08-26 | Stop reason: HOSPADM

## 2025-08-23 RX ORDER — ALPRAZOLAM 1 MG/1
1 TABLET ORAL 3 TIMES DAILY
COMMUNITY

## 2025-08-23 RX ORDER — AMLODIPINE BESYLATE 5 MG/1
5 TABLET ORAL DAILY
COMMUNITY
Start: 2025-07-28 | End: 2025-08-26 | Stop reason: HOSPADM

## 2025-08-23 RX ADMIN — ROPINIROLE 1 MG: 1 TABLET, FILM COATED ORAL at 03:12

## 2025-08-23 RX ADMIN — PANTOPRAZOLE SODIUM 40 MG: 40 TABLET, DELAYED RELEASE ORAL at 11:50

## 2025-08-23 RX ADMIN — ATORVASTATIN CALCIUM 20 MG: 20 TABLET, FILM COATED ORAL at 16:52

## 2025-08-23 RX ADMIN — ROPINIROLE 1 MG: 1 TABLET, FILM COATED ORAL at 20:50

## 2025-08-23 RX ADMIN — ALPRAZOLAM 1 MG: 0.25 TABLET ORAL at 21:03

## 2025-08-23 RX ADMIN — Medication 10 ML: at 20:51

## 2025-08-23 RX ADMIN — Medication 10 ML: at 08:21

## 2025-08-23 RX ADMIN — BUMETANIDE 3 MG: 0.25 INJECTION INTRAMUSCULAR; INTRAVENOUS at 03:12

## 2025-08-23 RX ADMIN — BUMETANIDE 3 MG: 0.25 INJECTION INTRAMUSCULAR; INTRAVENOUS at 08:21

## 2025-08-24 ENCOUNTER — APPOINTMENT (OUTPATIENT)
Dept: GENERAL RADIOLOGY | Facility: HOSPITAL | Age: 85
End: 2025-08-24
Payer: MEDICARE

## 2025-08-24 ENCOUNTER — APPOINTMENT (OUTPATIENT)
Dept: CARDIOLOGY | Facility: HOSPITAL | Age: 85
End: 2025-08-24
Payer: MEDICARE

## 2025-08-24 LAB
ALBUMIN FLD-MCNC: 1.7 G/DL
ALBUMIN SERPL-MCNC: 4 G/DL (ref 3.5–5.2)
ALBUMIN/GLOB SERPL: 1.7 G/DL
ALP SERPL-CCNC: 112 U/L (ref 39–117)
ALT SERPL W P-5'-P-CCNC: 15 U/L (ref 1–33)
ANION GAP SERPL CALCULATED.3IONS-SCNC: 13.4 MMOL/L (ref 5–15)
AORTIC DIMENSIONLESS INDEX: 0.32 (DI)
APPEARANCE FLD: CLEAR
AST SERPL-CCNC: 18 U/L (ref 1–32)
AV MEAN PRESS GRAD SYS DOP V1V2: 11 MMHG
AV VMAX SYS DOP: 220 CM/SEC
BH CV ECHO MEAS - AO MAX PG: 19.4 MMHG
BH CV ECHO MEAS - AO ROOT DIAM: 3.1 CM
BH CV ECHO MEAS - AO V2 VTI: 53.1 CM
BH CV ECHO MEAS - AVA(I,D): 1.02 CM2
BH CV ECHO MEAS - EDV(CUBED): 85.2 ML
BH CV ECHO MEAS - EDV(MOD-SP2): 43 ML
BH CV ECHO MEAS - EDV(MOD-SP4): 30.1 ML
BH CV ECHO MEAS - EF(MOD-SP2): 54.7 %
BH CV ECHO MEAS - EF(MOD-SP4): 50.5 %
BH CV ECHO MEAS - ESV(CUBED): 19.7 ML
BH CV ECHO MEAS - ESV(MOD-SP2): 19.5 ML
BH CV ECHO MEAS - ESV(MOD-SP4): 14.9 ML
BH CV ECHO MEAS - FS: 38.6 %
BH CV ECHO MEAS - IVS/LVPW: 1.5 CM
BH CV ECHO MEAS - IVSD: 1.2 CM
BH CV ECHO MEAS - LA DIMENSION: 4.8 CM
BH CV ECHO MEAS - LAT PEAK E' VEL: 13.1 CM/SEC
BH CV ECHO MEAS - LV DIASTOLIC VOL/BSA (35-75): 17.8 CM2
BH CV ECHO MEAS - LV MASS(C)D: 147.8 GRAMS
BH CV ECHO MEAS - LV MAX PG: 1.93 MMHG
BH CV ECHO MEAS - LV MEAN PG: 1 MMHG
BH CV ECHO MEAS - LV SYSTOLIC VOL/BSA (12-30): 8.8 CM2
BH CV ECHO MEAS - LV V1 MAX: 69.5 CM/SEC
BH CV ECHO MEAS - LV V1 VTI: 17.2 CM
BH CV ECHO MEAS - LVIDD: 4.4 CM
BH CV ECHO MEAS - LVIDS: 2.7 CM
BH CV ECHO MEAS - LVOT AREA: 3.1 CM2
BH CV ECHO MEAS - LVOT DIAM: 2 CM
BH CV ECHO MEAS - LVPWD: 0.8 CM
BH CV ECHO MEAS - MED PEAK E' VEL: 9 CM/SEC
BH CV ECHO MEAS - MV DEC TIME: 0.21 SEC
BH CV ECHO MEAS - MV MEAN PG: 2 MMHG
BH CV ECHO MEAS - MV V2 VTI: 17.8 CM
BH CV ECHO MEAS - MVA(VTI): 3 CM2
BH CV ECHO MEAS - RVDD: 2.7 CM
BH CV ECHO MEAS - SV(LVOT): 54 ML
BH CV ECHO MEAS - SV(MOD-SP2): 23.5 ML
BH CV ECHO MEAS - SV(MOD-SP4): 15.2 ML
BH CV ECHO MEAS - SVI(LVOT): 31.9 ML/M2
BH CV ECHO MEAS - SVI(MOD-SP2): 13.9 ML/M2
BH CV ECHO MEAS - SVI(MOD-SP4): 9 ML/M2
BH CV ECHO MEAS - TAPSE (>1.6): 1.83 CM
BH CV ECHO MEAS - TR MAX PG: 54.2 MMHG
BH CV ECHO MEAS - TR MAX VEL: 368 CM/SEC
BILIRUB SERPL-MCNC: 0.8 MG/DL (ref 0–1.2)
BUN SERPL-MCNC: 42.3 MG/DL (ref 8–23)
BUN/CREAT SERPL: 22.3 (ref 7–25)
CALCIUM SPEC-SCNC: 9.2 MG/DL (ref 8.6–10.5)
CHLORIDE SERPL-SCNC: 103 MMOL/L (ref 98–107)
CHOLESTEROL FLUID: 16 MG/DL
CO2 SERPL-SCNC: 22.6 MMOL/L (ref 22–29)
COLOR FLD: YELLOW
CREAT SERPL-MCNC: 1.9 MG/DL (ref 0.57–1)
DEPRECATED RDW RBC AUTO: 47.4 FL (ref 37–54)
EGFRCR SERPLBLD CKD-EPI 2021: 25.5 ML/MIN/1.73
ERYTHROCYTE [DISTWIDTH] IN BLOOD BY AUTOMATED COUNT: 15.5 % (ref 12.3–15.4)
GLOBULIN UR ELPH-MCNC: 2.4 GM/DL
GLUCOSE FLD-MCNC: 137 MG/DL
GLUCOSE SERPL-MCNC: 103 MG/DL (ref 65–99)
HCT VFR BLD AUTO: 31.2 % (ref 34–46.6)
HGB BLD-MCNC: 11.4 G/DL (ref 12–15.9)
IVRT: 85 MS
LDH FLD-CCNC: 84 U/L
LEFT ATRIUM VOLUME INDEX: 24.7 ML/M2
LV EF BIPLANE MOD: 50.5 %
LYMPHOCYTES NFR FLD MANUAL: 49 %
MACROPHAGE FLUID %: 7 %
MAGNESIUM SERPL-MCNC: 1.9 MG/DL (ref 1.6–2.4)
MCH RBC QN AUTO: 36.1 PG (ref 26.6–33)
MCHC RBC AUTO-ENTMCNC: 36.5 G/DL (ref 31.5–35.7)
MCV RBC AUTO: 98.7 FL (ref 79–97)
MONOCYTES NFR FLD: 24 %
NEUTROPHILS NFR FLD MANUAL: 7 %
NUC CELL # FLD: 265 /MM3
PH FLD: 8 [PH]
PHOSPHATE SERPL-MCNC: 3.6 MG/DL (ref 2.5–4.5)
PLASMA CELLS NFR FLD: 13 %
PLATELET # BLD AUTO: 190 10*3/MM3 (ref 140–450)
PMV BLD AUTO: 9.5 FL (ref 6–12)
POTASSIUM SERPL-SCNC: 3.1 MMOL/L (ref 3.5–5.2)
PROT FLD-MCNC: 2.3 G/DL
PROT SERPL-MCNC: 6.4 G/DL (ref 6–8.5)
RBC # BLD AUTO: 3.16 10*6/MM3 (ref 3.77–5.28)
RBC # FLD AUTO: <2000 /MM3
SODIUM SERPL-SCNC: 139 MMOL/L (ref 136–145)
TRIGL FLD-MCNC: 11 MG/DL
WBC NRBC COR # BLD AUTO: 6.93 10*3/MM3 (ref 3.4–10.8)

## 2025-08-24 PROCEDURE — 85027 COMPLETE CBC AUTOMATED: CPT | Performed by: INTERNAL MEDICINE

## 2025-08-24 PROCEDURE — 84157 ASSAY OF PROTEIN OTHER: CPT | Performed by: STUDENT IN AN ORGANIZED HEALTH CARE EDUCATION/TRAINING PROGRAM

## 2025-08-24 PROCEDURE — 87075 CULTR BACTERIA EXCEPT BLOOD: CPT | Performed by: STUDENT IN AN ORGANIZED HEALTH CARE EDUCATION/TRAINING PROGRAM

## 2025-08-24 PROCEDURE — 84100 ASSAY OF PHOSPHORUS: CPT | Performed by: INTERNAL MEDICINE

## 2025-08-24 PROCEDURE — 87070 CULTURE OTHR SPECIMN AEROBIC: CPT | Performed by: STUDENT IN AN ORGANIZED HEALTH CARE EDUCATION/TRAINING PROGRAM

## 2025-08-24 PROCEDURE — 99233 SBSQ HOSP IP/OBS HIGH 50: CPT | Performed by: STUDENT IN AN ORGANIZED HEALTH CARE EDUCATION/TRAINING PROGRAM

## 2025-08-24 PROCEDURE — 87206 SMEAR FLUORESCENT/ACID STAI: CPT | Performed by: STUDENT IN AN ORGANIZED HEALTH CARE EDUCATION/TRAINING PROGRAM

## 2025-08-24 PROCEDURE — 93306 TTE W/DOPPLER COMPLETE: CPT | Performed by: INTERNAL MEDICINE

## 2025-08-24 PROCEDURE — 87102 FUNGUS ISOLATION CULTURE: CPT | Performed by: STUDENT IN AN ORGANIZED HEALTH CARE EDUCATION/TRAINING PROGRAM

## 2025-08-24 PROCEDURE — 25010000002 BUMETANIDE PER 0.5 MG: Performed by: INTERNAL MEDICINE

## 2025-08-24 PROCEDURE — 32555 ASPIRATE PLEURA W/ IMAGING: CPT | Performed by: STUDENT IN AN ORGANIZED HEALTH CARE EDUCATION/TRAINING PROGRAM

## 2025-08-24 PROCEDURE — 82945 GLUCOSE OTHER FLUID: CPT | Performed by: STUDENT IN AN ORGANIZED HEALTH CARE EDUCATION/TRAINING PROGRAM

## 2025-08-24 PROCEDURE — 89051 BODY FLUID CELL COUNT: CPT | Performed by: STUDENT IN AN ORGANIZED HEALTH CARE EDUCATION/TRAINING PROGRAM

## 2025-08-24 PROCEDURE — 87015 SPECIMEN INFECT AGNT CONCNTJ: CPT | Performed by: STUDENT IN AN ORGANIZED HEALTH CARE EDUCATION/TRAINING PROGRAM

## 2025-08-24 PROCEDURE — 83615 LACTATE (LD) (LDH) ENZYME: CPT | Performed by: STUDENT IN AN ORGANIZED HEALTH CARE EDUCATION/TRAINING PROGRAM

## 2025-08-24 PROCEDURE — 84311 SPECTROPHOTOMETRY: CPT | Performed by: STUDENT IN AN ORGANIZED HEALTH CARE EDUCATION/TRAINING PROGRAM

## 2025-08-24 PROCEDURE — 71045 X-RAY EXAM CHEST 1 VIEW: CPT

## 2025-08-24 PROCEDURE — 87116 MYCOBACTERIA CULTURE: CPT | Performed by: STUDENT IN AN ORGANIZED HEALTH CARE EDUCATION/TRAINING PROGRAM

## 2025-08-24 PROCEDURE — 83986 ASSAY PH BODY FLUID NOS: CPT | Performed by: STUDENT IN AN ORGANIZED HEALTH CARE EDUCATION/TRAINING PROGRAM

## 2025-08-24 PROCEDURE — 93306 TTE W/DOPPLER COMPLETE: CPT

## 2025-08-24 PROCEDURE — 88108 CYTOPATH CONCENTRATE TECH: CPT | Performed by: STUDENT IN AN ORGANIZED HEALTH CARE EDUCATION/TRAINING PROGRAM

## 2025-08-24 PROCEDURE — 80053 COMPREHEN METABOLIC PANEL: CPT | Performed by: INTERNAL MEDICINE

## 2025-08-24 PROCEDURE — 87205 SMEAR GRAM STAIN: CPT | Performed by: STUDENT IN AN ORGANIZED HEALTH CARE EDUCATION/TRAINING PROGRAM

## 2025-08-24 PROCEDURE — 99232 SBSQ HOSP IP/OBS MODERATE 35: CPT | Performed by: INTERNAL MEDICINE

## 2025-08-24 PROCEDURE — 76604 US EXAM CHEST: CPT | Performed by: STUDENT IN AN ORGANIZED HEALTH CARE EDUCATION/TRAINING PROGRAM

## 2025-08-24 PROCEDURE — 82042 OTHER SOURCE ALBUMIN QUAN EA: CPT | Performed by: STUDENT IN AN ORGANIZED HEALTH CARE EDUCATION/TRAINING PROGRAM

## 2025-08-24 PROCEDURE — 84478 ASSAY OF TRIGLYCERIDES: CPT | Performed by: STUDENT IN AN ORGANIZED HEALTH CARE EDUCATION/TRAINING PROGRAM

## 2025-08-24 PROCEDURE — 83735 ASSAY OF MAGNESIUM: CPT | Performed by: INTERNAL MEDICINE

## 2025-08-24 RX ORDER — POTASSIUM CHLORIDE 1500 MG/1
40 TABLET, EXTENDED RELEASE ORAL ONCE
Status: COMPLETED | OUTPATIENT
Start: 2025-08-24 | End: 2025-08-24

## 2025-08-24 RX ORDER — BUMETANIDE 0.25 MG/ML
3 INJECTION, SOLUTION INTRAMUSCULAR; INTRAVENOUS EVERY 8 HOURS
Status: COMPLETED | OUTPATIENT
Start: 2025-08-25 | End: 2025-08-25

## 2025-08-24 RX ADMIN — ROPINIROLE 1 MG: 1 TABLET, FILM COATED ORAL at 20:44

## 2025-08-24 RX ADMIN — ATORVASTATIN CALCIUM 20 MG: 20 TABLET, FILM COATED ORAL at 08:47

## 2025-08-24 RX ADMIN — Medication 10 ML: at 08:47

## 2025-08-24 RX ADMIN — BUMETANIDE 3 MG: 0.25 INJECTION INTRAMUSCULAR; INTRAVENOUS at 16:52

## 2025-08-24 RX ADMIN — BUMETANIDE 3 MG: 0.25 INJECTION INTRAMUSCULAR; INTRAVENOUS at 08:46

## 2025-08-24 RX ADMIN — POTASSIUM CHLORIDE 40 MEQ: 1500 TABLET, EXTENDED RELEASE ORAL at 08:46

## 2025-08-24 RX ADMIN — Medication 10 ML: at 20:45

## 2025-08-24 RX ADMIN — PANTOPRAZOLE SODIUM 40 MG: 40 TABLET, DELAYED RELEASE ORAL at 12:41

## 2025-08-24 RX ADMIN — POTASSIUM CHLORIDE 40 MEQ: 1500 TABLET, EXTENDED RELEASE ORAL at 20:44

## 2025-08-24 RX ADMIN — ALPRAZOLAM 1 MG: 0.25 TABLET ORAL at 16:52

## 2025-08-25 LAB
ANION GAP SERPL CALCULATED.3IONS-SCNC: 12.8 MMOL/L (ref 5–15)
BUN SERPL-MCNC: 42.6 MG/DL (ref 8–23)
BUN/CREAT SERPL: 21 (ref 7–25)
CALCIUM SPEC-SCNC: 9.9 MG/DL (ref 8.6–10.5)
CHLORIDE SERPL-SCNC: 105 MMOL/L (ref 98–107)
CO2 SERPL-SCNC: 25.2 MMOL/L (ref 22–29)
CREAT SERPL-MCNC: 2.03 MG/DL (ref 0.57–1)
DEPRECATED RDW RBC AUTO: 49.5 FL (ref 37–54)
EGFRCR SERPLBLD CKD-EPI 2021: 23.5 ML/MIN/1.73
ERYTHROCYTE [DISTWIDTH] IN BLOOD BY AUTOMATED COUNT: 18.7 % (ref 12.3–15.4)
GLUCOSE SERPL-MCNC: 102 MG/DL (ref 65–99)
HCT VFR BLD AUTO: 31 % (ref 34–46.6)
HGB BLD-MCNC: 11.9 G/DL (ref 12–15.9)
LDH SERPL-CCNC: 223 U/L (ref 135–214)
MAGNESIUM SERPL-MCNC: 1.9 MG/DL (ref 1.6–2.4)
MCH RBC QN AUTO: 39.8 PG (ref 26.6–33)
MCHC RBC AUTO-ENTMCNC: 38.4 G/DL (ref 31.5–35.7)
MCV RBC AUTO: 103.7 FL (ref 79–97)
PLATELET # BLD AUTO: 185 10*3/MM3 (ref 140–450)
PMV BLD AUTO: 9.8 FL (ref 6–12)
POTASSIUM SERPL-SCNC: 4.1 MMOL/L (ref 3.5–5.2)
RBC # BLD AUTO: 2.99 10*6/MM3 (ref 3.77–5.28)
SODIUM SERPL-SCNC: 143 MMOL/L (ref 136–145)
WBC NRBC COR # BLD AUTO: 7.2 10*3/MM3 (ref 3.4–10.8)

## 2025-08-25 PROCEDURE — 97161 PT EVAL LOW COMPLEX 20 MIN: CPT

## 2025-08-25 PROCEDURE — 85027 COMPLETE CBC AUTOMATED: CPT | Performed by: INTERNAL MEDICINE

## 2025-08-25 PROCEDURE — 25010000002 BUMETANIDE PER 0.5 MG: Performed by: INTERNAL MEDICINE

## 2025-08-25 PROCEDURE — 99232 SBSQ HOSP IP/OBS MODERATE 35: CPT | Performed by: INTERNAL MEDICINE

## 2025-08-25 PROCEDURE — 97165 OT EVAL LOW COMPLEX 30 MIN: CPT

## 2025-08-25 PROCEDURE — 83735 ASSAY OF MAGNESIUM: CPT | Performed by: INTERNAL MEDICINE

## 2025-08-25 PROCEDURE — 80048 BASIC METABOLIC PNL TOTAL CA: CPT | Performed by: INTERNAL MEDICINE

## 2025-08-25 PROCEDURE — 83615 LACTATE (LD) (LDH) ENZYME: CPT | Performed by: NURSE PRACTITIONER

## 2025-08-25 RX ORDER — ATENOLOL 50 MG/1
50 TABLET ORAL DAILY
Status: DISCONTINUED | OUTPATIENT
Start: 2025-08-25 | End: 2025-08-26 | Stop reason: HOSPADM

## 2025-08-25 RX ADMIN — Medication 10 ML: at 08:26

## 2025-08-25 RX ADMIN — ROPINIROLE 1 MG: 1 TABLET, FILM COATED ORAL at 20:45

## 2025-08-25 RX ADMIN — ATORVASTATIN CALCIUM 20 MG: 20 TABLET, FILM COATED ORAL at 08:26

## 2025-08-25 RX ADMIN — APIXABAN 2.5 MG: 2.5 TABLET, FILM COATED ORAL at 20:45

## 2025-08-25 RX ADMIN — ALPRAZOLAM 1 MG: 0.25 TABLET ORAL at 20:48

## 2025-08-25 RX ADMIN — Medication 10 ML: at 20:46

## 2025-08-25 RX ADMIN — BUMETANIDE 3 MG: 0.25 INJECTION INTRAMUSCULAR; INTRAVENOUS at 08:26

## 2025-08-25 RX ADMIN — PANTOPRAZOLE SODIUM 40 MG: 40 TABLET, DELAYED RELEASE ORAL at 07:03

## 2025-08-25 RX ADMIN — BUMETANIDE 3 MG: 0.25 INJECTION INTRAMUSCULAR; INTRAVENOUS at 16:24

## 2025-08-25 RX ADMIN — ATENOLOL 50 MG: 50 TABLET ORAL at 14:31

## 2025-08-26 VITALS
OXYGEN SATURATION: 99 % | WEIGHT: 136.91 LBS | BODY MASS INDEX: 22.81 KG/M2 | TEMPERATURE: 97.9 F | SYSTOLIC BLOOD PRESSURE: 122 MMHG | HEIGHT: 65 IN | DIASTOLIC BLOOD PRESSURE: 67 MMHG | HEART RATE: 85 BPM | RESPIRATION RATE: 17 BRPM

## 2025-08-26 PROBLEM — R06.09 DYSPNEA ON EXERTION: Status: RESOLVED | Noted: 2025-08-23 | Resolved: 2025-08-26

## 2025-08-26 PROBLEM — E87.70 HYPERVOLEMIA: Status: RESOLVED | Noted: 2025-08-23 | Resolved: 2025-08-26

## 2025-08-26 LAB
ANION GAP SERPL CALCULATED.3IONS-SCNC: 16.9 MMOL/L (ref 5–15)
BUN SERPL-MCNC: 51.8 MG/DL (ref 8–23)
BUN/CREAT SERPL: 24.2 (ref 7–25)
CALCIUM SPEC-SCNC: 9.4 MG/DL (ref 8.6–10.5)
CHLORIDE SERPL-SCNC: 99 MMOL/L (ref 98–107)
CO2 SERPL-SCNC: 24.1 MMOL/L (ref 22–29)
CREAT SERPL-MCNC: 2.14 MG/DL (ref 0.57–1)
DEPRECATED RDW RBC AUTO: 49.6 FL (ref 37–54)
EGFRCR SERPLBLD CKD-EPI 2021: 22.1 ML/MIN/1.73
ERYTHROCYTE [DISTWIDTH] IN BLOOD BY AUTOMATED COUNT: 14.3 % (ref 12.3–15.4)
GLUCOSE SERPL-MCNC: 110 MG/DL (ref 65–99)
HCT VFR BLD AUTO: 35 % (ref 34–46.6)
HGB BLD-MCNC: 12.3 G/DL (ref 12–15.9)
Lab: NORMAL
MAGNESIUM SERPL-MCNC: 1.8 MG/DL (ref 1.6–2.4)
MCH RBC QN AUTO: 35.5 PG (ref 26.6–33)
MCHC RBC AUTO-ENTMCNC: 35.1 G/DL (ref 31.5–35.7)
MCV RBC AUTO: 101.2 FL (ref 79–97)
PLATELET # BLD AUTO: 188 10*3/MM3 (ref 140–450)
PMV BLD AUTO: 9.8 FL (ref 6–12)
POTASSIUM SERPL-SCNC: 3.8 MMOL/L (ref 3.5–5.2)
RBC # BLD AUTO: 3.46 10*6/MM3 (ref 3.77–5.28)
SODIUM SERPL-SCNC: 140 MMOL/L (ref 136–145)
WBC NRBC COR # BLD AUTO: 7.55 10*3/MM3 (ref 3.4–10.8)

## 2025-08-26 PROCEDURE — 99239 HOSP IP/OBS DSCHRG MGMT >30: CPT | Performed by: INTERNAL MEDICINE

## 2025-08-26 PROCEDURE — 83735 ASSAY OF MAGNESIUM: CPT | Performed by: INTERNAL MEDICINE

## 2025-08-26 PROCEDURE — 80048 BASIC METABOLIC PNL TOTAL CA: CPT | Performed by: INTERNAL MEDICINE

## 2025-08-26 PROCEDURE — 85027 COMPLETE CBC AUTOMATED: CPT | Performed by: INTERNAL MEDICINE

## 2025-08-26 RX ORDER — FOLIC ACID 1 MG/1
1 TABLET ORAL DAILY
Status: DISCONTINUED | OUTPATIENT
Start: 2025-08-26 | End: 2025-08-26 | Stop reason: HOSPADM

## 2025-08-26 RX ORDER — CALCIUM CARBONATE 500 MG/1
1 TABLET, CHEWABLE ORAL 3 TIMES DAILY PRN
Status: DISCONTINUED | OUTPATIENT
Start: 2025-08-26 | End: 2025-08-26 | Stop reason: HOSPADM

## 2025-08-26 RX ORDER — FUROSEMIDE 40 MG/1
40 TABLET ORAL
Status: DISCONTINUED | OUTPATIENT
Start: 2025-08-26 | End: 2025-08-26 | Stop reason: HOSPADM

## 2025-08-26 RX ORDER — FUROSEMIDE 20 MG/1
40 TABLET ORAL 2 TIMES DAILY
Qty: 120 TABLET | Refills: 0 | Status: SHIPPED | OUTPATIENT
Start: 2025-08-26

## 2025-08-26 RX ADMIN — ATENOLOL 50 MG: 50 TABLET ORAL at 08:39

## 2025-08-26 RX ADMIN — ATORVASTATIN CALCIUM 20 MG: 20 TABLET, FILM COATED ORAL at 08:39

## 2025-08-26 RX ADMIN — APIXABAN 2.5 MG: 2.5 TABLET, FILM COATED ORAL at 08:39

## 2025-08-26 RX ADMIN — CALCIUM CARBONATE 1 TABLET: 500 TABLET, CHEWABLE ORAL at 03:45

## 2025-08-26 RX ADMIN — FOLIC ACID 1 MG: 1 TABLET ORAL at 12:51

## 2025-08-26 RX ADMIN — Medication 10 ML: at 08:39

## 2025-08-26 RX ADMIN — FUROSEMIDE 40 MG: 40 TABLET ORAL at 08:39

## 2025-08-26 RX ADMIN — PANTOPRAZOLE SODIUM 40 MG: 40 TABLET, DELAYED RELEASE ORAL at 11:15

## 2025-08-27 ENCOUNTER — READMISSION MANAGEMENT (OUTPATIENT)
Dept: CALL CENTER | Facility: HOSPITAL | Age: 85
End: 2025-08-27
Payer: MEDICARE

## 2025-08-27 LAB
BACTERIA FLD CULT: NORMAL
CYTO UR: NORMAL
GRAM STN SPEC: NORMAL
LAB AP CASE REPORT: NORMAL
LAB AP CLINICAL INFORMATION: NORMAL
LAB AP FLOW CYTOMETRY SUMMARY: NORMAL
PATH REPORT.FINAL DX SPEC: NORMAL
PATH REPORT.GROSS SPEC: NORMAL

## 2025-08-29 LAB
CYTO UR: NORMAL
LAB AP CASE REPORT: NORMAL
LAB AP CLINICAL INFORMATION: NORMAL
PATH REPORT.FINAL DX SPEC: NORMAL
PATH REPORT.GROSS SPEC: NORMAL

## 2025-08-30 ENCOUNTER — HOSPITAL ENCOUNTER (EMERGENCY)
Facility: HOSPITAL | Age: 85
Discharge: HOME | End: 2025-08-30
Attending: EMERGENCY MEDICINE
Payer: MEDICARE

## 2025-08-30 VITALS
SYSTOLIC BLOOD PRESSURE: 122 MMHG | BODY MASS INDEX: 22.81 KG/M2 | HEIGHT: 65 IN | HEART RATE: 64 BPM | OXYGEN SATURATION: 96 % | DIASTOLIC BLOOD PRESSURE: 74 MMHG | RESPIRATION RATE: 14 BRPM | WEIGHT: 136.91 LBS | TEMPERATURE: 97.8 F

## 2025-08-30 DIAGNOSIS — K59.00 CONSTIPATION, UNSPECIFIED CONSTIPATION TYPE: Primary | ICD-10-CM

## 2025-08-30 LAB
ALBUMIN SERPL-MCNC: 4.6 G/DL (ref 3.5–5.2)
ALBUMIN/GLOB SERPL: 1.5 G/DL
ALP SERPL-CCNC: 119 U/L (ref 39–117)
ALT SERPL W P-5'-P-CCNC: 13 U/L (ref 1–33)
ANION GAP SERPL CALCULATED.3IONS-SCNC: 13.4 MMOL/L (ref 5–15)
AST SERPL-CCNC: 25 U/L (ref 1–32)
BACTERIA SPEC ANAEROBE CULT: NORMAL
BACTERIA UR QL AUTO: NORMAL /HPF
BASOPHILS # BLD AUTO: 0.05 10*3/MM3 (ref 0–0.2)
BASOPHILS NFR BLD AUTO: 0.5 % (ref 0–1.5)
BILIRUB SERPL-MCNC: 1.5 MG/DL (ref 0–1.2)
BILIRUB UR QL STRIP: NEGATIVE
BUN SERPL-MCNC: 65.4 MG/DL (ref 8–23)
BUN/CREAT SERPL: 25.5 (ref 7–25)
CALCIUM SPEC-SCNC: 10.1 MG/DL (ref 8.6–10.5)
CHLORIDE SERPL-SCNC: 97 MMOL/L (ref 98–107)
CLARITY UR: CLEAR
CO2 SERPL-SCNC: 28.6 MMOL/L (ref 22–29)
COLOR UR: YELLOW
CREAT SERPL-MCNC: 2.56 MG/DL (ref 0.57–1)
D-LACTATE SERPL-SCNC: 1.2 MMOL/L (ref 0.5–2)
DEPRECATED RDW RBC AUTO: 46.5 FL (ref 37–54)
EGFRCR SERPLBLD CKD-EPI 2021: 17.8 ML/MIN/1.73
EOSINOPHIL # BLD AUTO: 0.12 10*3/MM3 (ref 0–0.4)
EOSINOPHIL NFR BLD AUTO: 1.2 % (ref 0.3–6.2)
ERYTHROCYTE [DISTWIDTH] IN BLOOD BY AUTOMATED COUNT: 13 % (ref 12.3–15.4)
GLOBULIN UR ELPH-MCNC: 3.1 GM/DL
GLUCOSE SERPL-MCNC: 133 MG/DL (ref 65–99)
GLUCOSE UR STRIP-MCNC: NEGATIVE MG/DL
HCT VFR BLD AUTO: 38.4 % (ref 34–46.6)
HGB BLD-MCNC: 12.6 G/DL (ref 12–15.9)
HGB UR QL STRIP.AUTO: NEGATIVE
HOLD SPECIMEN: NORMAL
HOLD SPECIMEN: NORMAL
HYALINE CASTS UR QL AUTO: NORMAL /LPF
IMM GRANULOCYTES # BLD AUTO: 0.04 10*3/MM3 (ref 0–0.05)
IMM GRANULOCYTES NFR BLD AUTO: 0.4 % (ref 0–0.5)
KETONES UR QL STRIP: NEGATIVE
LEUKOCYTE ESTERASE UR QL STRIP.AUTO: ABNORMAL
LIPASE SERPL-CCNC: 90 U/L (ref 13–60)
LYMPHOCYTES # BLD AUTO: 2.27 10*3/MM3 (ref 0.7–3.1)
LYMPHOCYTES NFR BLD AUTO: 23.2 % (ref 19.6–45.3)
MCH RBC QN AUTO: 32.3 PG (ref 26.6–33)
MCHC RBC AUTO-ENTMCNC: 32.8 G/DL (ref 31.5–35.7)
MCV RBC AUTO: 98.5 FL (ref 79–97)
MONOCYTES # BLD AUTO: 0.85 10*3/MM3 (ref 0.1–0.9)
MONOCYTES NFR BLD AUTO: 8.7 % (ref 5–12)
NEUTROPHILS NFR BLD AUTO: 6.46 10*3/MM3 (ref 1.7–7)
NEUTROPHILS NFR BLD AUTO: 66 % (ref 42.7–76)
NITRITE UR QL STRIP: NEGATIVE
NRBC BLD AUTO-RTO: 0 /100 WBC (ref 0–0.2)
PH UR STRIP.AUTO: 5.5 [PH] (ref 5–8)
PLATELET # BLD AUTO: 154 10*3/MM3 (ref 140–450)
PMV BLD AUTO: 10.4 FL (ref 6–12)
POTASSIUM SERPL-SCNC: 3.6 MMOL/L (ref 3.5–5.2)
PROT SERPL-MCNC: 7.7 G/DL (ref 6–8.5)
PROT UR QL STRIP: NEGATIVE
RBC # BLD AUTO: 3.9 10*6/MM3 (ref 3.77–5.28)
RBC # UR STRIP: NORMAL /HPF
REF LAB TEST METHOD: NORMAL
SODIUM SERPL-SCNC: 139 MMOL/L (ref 136–145)
SP GR UR STRIP: 1.01 (ref 1–1.03)
SQUAMOUS #/AREA URNS HPF: NORMAL /HPF
UROBILINOGEN UR QL STRIP: ABNORMAL
WBC # UR STRIP: NORMAL /HPF
WBC NRBC COR # BLD AUTO: 9.79 10*3/MM3 (ref 3.4–10.8)
WHOLE BLOOD HOLD COAG: NORMAL
WHOLE BLOOD HOLD SPECIMEN: NORMAL

## 2025-08-30 PROCEDURE — 81001 URINALYSIS AUTO W/SCOPE: CPT | Performed by: EMERGENCY MEDICINE

## 2025-08-30 PROCEDURE — 83605 ASSAY OF LACTIC ACID: CPT

## 2025-08-30 PROCEDURE — 80053 COMPREHEN METABOLIC PANEL: CPT

## 2025-08-30 PROCEDURE — 85025 COMPLETE CBC W/AUTO DIFF WBC: CPT | Performed by: EMERGENCY MEDICINE

## 2025-08-30 PROCEDURE — 83690 ASSAY OF LIPASE: CPT

## 2025-08-30 RX ORDER — SODIUM CHLORIDE 0.9 % (FLUSH) 0.9 %
10 SYRINGE (ML) INJECTION AS NEEDED
Status: DISCONTINUED | OUTPATIENT
Start: 2025-08-30 | End: 2025-08-30 | Stop reason: HOSPADM

## 2025-08-30 RX ADMIN — DOCUSATE SODIUM 1 ENEMA: 283 LIQUID RECTAL at 16:50

## 2025-08-31 LAB
FUNGUS WND CULT: NORMAL
MYCOBACTERIUM SPEC CULT: NORMAL
NIGHT BLUE STAIN TISS: NORMAL

## (undated) DEVICE — ARM VASCULAR-LF: Brand: MEDLINE INDUSTRIES, INC.

## (undated) DEVICE — SLV SCD KN/LEN ADJ EXPRSS BLENDED MD 1P/U

## (undated) DEVICE — GLV SURG SENSICARE PI ORTHO SZ7.5 LF STRL

## (undated) DEVICE — SUT PROLN 6/0 C1 D/A 30IN 8706H

## (undated) DEVICE — THE STERILE LIGHT HANDLE COVER IS USED WITH STERIS SURGICAL LIGHTING AND VISUALIZATION SYSTEMS.

## (undated) DEVICE — SUT PROLN 7/0 BV1 D/A 24IN 8702H

## (undated) DEVICE — SYR HEP MEDALLION 20ML RED

## (undated) DEVICE — PENCL SMOKE/EVAC MEGADYNE TELESCP 10FT

## (undated) DEVICE — RADIFOCUS OPTITORQUE ANGIOGRAPHIC CATHETER: Brand: OPTITORQUE

## (undated) DEVICE — SUT SILK 3/0 TIES 18IN A184H

## (undated) DEVICE — GLIDESHEATH SLENDER STAINLESS STEEL KIT: Brand: GLIDESHEATH SLENDER

## (undated) DEVICE — SUT VIC 3/0 SH 27IN J416H

## (undated) DEVICE — INTRO SHEATH PRELUDE PRO .035 4F11X50 LF

## (undated) DEVICE — SOL NS 500ML

## (undated) DEVICE — CATH F4 INF JL 4 100CM: Brand: INFINITI

## (undated) DEVICE — RADIFOCUS GLIDEWIRE: Brand: GLIDEWIRE

## (undated) DEVICE — SUT SILK 2/0 TIES 18IN A185H

## (undated) DEVICE — STERILE POLYISOPRENE POWDER-FREE SURGICAL GLOVES WITH EMOLLIENT COATING: Brand: PROTEXIS

## (undated) DEVICE — GLV SURG SENSICARE PI ORTHO SZ6.5 LF STRL

## (undated) DEVICE — STERILE POLYISOPRENE POWDER-FREE SURGICAL GLOVES: Brand: PROTEXIS

## (undated) DEVICE — CATH F4 INF JL 5 100CM: Brand: INFINITI

## (undated) DEVICE — CATH F4 INF JR 4 100CM: Brand: INFINITI

## (undated) DEVICE — CATH 4F INF PIG 145Â° 110 CM: Brand: INFINITI